# Patient Record
Sex: MALE | Race: WHITE | NOT HISPANIC OR LATINO | Employment: OTHER | URBAN - METROPOLITAN AREA
[De-identification: names, ages, dates, MRNs, and addresses within clinical notes are randomized per-mention and may not be internally consistent; named-entity substitution may affect disease eponyms.]

---

## 2017-01-11 ENCOUNTER — OFFICE VISIT (OUTPATIENT)
Dept: PULMONOLOGY | Facility: CLINIC | Age: 52
End: 2017-01-11
Payer: MEDICARE

## 2017-01-11 VITALS
DIASTOLIC BLOOD PRESSURE: 80 MMHG | HEIGHT: 70 IN | WEIGHT: 253.5 LBS | BODY MASS INDEX: 36.29 KG/M2 | SYSTOLIC BLOOD PRESSURE: 124 MMHG | HEART RATE: 96 BPM | OXYGEN SATURATION: 94 %

## 2017-01-11 DIAGNOSIS — J44.9 COPD MIXED TYPE: ICD-10-CM

## 2017-01-11 DIAGNOSIS — J45.40 MODERATE PERSISTENT ASTHMA, UNCOMPLICATED: ICD-10-CM

## 2017-01-11 DIAGNOSIS — J98.6 DIAPHRAGM PARALYSIS: Primary | ICD-10-CM

## 2017-01-11 PROCEDURE — 1159F MED LIST DOCD IN RCRD: CPT | Mod: S$GLB,,, | Performed by: INTERNAL MEDICINE

## 2017-01-11 PROCEDURE — 99999 PR PBB SHADOW E&M-EST. PATIENT-LVL IV: CPT | Mod: PBBFAC,,, | Performed by: INTERNAL MEDICINE

## 2017-01-11 PROCEDURE — 99214 OFFICE O/P EST MOD 30 MIN: CPT | Mod: S$GLB,,, | Performed by: INTERNAL MEDICINE

## 2017-01-11 RX ORDER — ALBUTEROL SULFATE 90 UG/1
1 AEROSOL, METERED RESPIRATORY (INHALATION) 4 TIMES DAILY PRN
Qty: 18 G | Refills: 11 | Status: SHIPPED | OUTPATIENT
Start: 2017-01-11 | End: 2018-04-16 | Stop reason: SDUPTHER

## 2017-01-11 RX ORDER — BUDESONIDE AND FORMOTEROL FUMARATE DIHYDRATE 160; 4.5 UG/1; UG/1
2 AEROSOL RESPIRATORY (INHALATION) EVERY 12 HOURS
Qty: 1 INHALER | Refills: 11 | Status: SHIPPED | OUTPATIENT
Start: 2017-01-11 | End: 2018-01-25

## 2017-01-11 RX ORDER — DIAZEPAM 10 MG/1
TABLET ORAL
Refills: 0 | COMMUNITY
Start: 2016-11-07 | End: 2018-10-01 | Stop reason: SDUPTHER

## 2017-01-11 RX ORDER — MONTELUKAST SODIUM 10 MG/1
10 TABLET ORAL NIGHTLY
Qty: 30 TABLET | Refills: 5 | Status: SHIPPED | OUTPATIENT
Start: 2017-01-11 | End: 2018-01-25

## 2017-01-11 RX ORDER — BUMETANIDE 2 MG/1
2 TABLET ORAL
COMMUNITY
End: 2017-11-20 | Stop reason: SDUPTHER

## 2017-01-11 RX ORDER — AZITHROMYCIN 500 MG/1
TABLET, FILM COATED ORAL
Qty: 3 TABLET | Refills: 3 | Status: SHIPPED | OUTPATIENT
Start: 2017-01-11 | End: 2018-01-25

## 2017-01-11 RX ORDER — IPRATROPIUM BROMIDE AND ALBUTEROL SULFATE 2.5; .5 MG/3ML; MG/3ML
3 SOLUTION RESPIRATORY (INHALATION) EVERY 6 HOURS PRN
Qty: 120 VIAL | Refills: 11 | Status: SHIPPED | OUTPATIENT
Start: 2017-01-11 | End: 2018-01-25

## 2017-01-11 RX ORDER — ALBUTEROL SULFATE 4 MG/1
4 TABLET ORAL 3 TIMES DAILY PRN
Qty: 90 TABLET | Refills: 11 | Status: SHIPPED | OUTPATIENT
Start: 2017-01-11 | End: 2018-01-11

## 2017-01-11 NOTE — PROGRESS NOTES
1/11/2017    Mina Zelaya  Office Note    Chief Complaint   Patient presents with    Follow-up     3 month    COPD     Fan 11, 2017- inactivity ppt weight gain, mood bad at times, daughter and grandson moved out and  Pt feeling down a bit.     Uses o2 and non invasive ventilator -- extreme air hunger with activity and supine. aspriates 2/day to 2 /week.  Uses bronchial rx.      Uses niv and o2 every night for sleep and for naps 1-2 daily.  Feels like worsening.              Sept 9, 2016HPI: worse off advair and spiriva, irratents worsen, niv used 12/24 daily, cannot bend over and uc doing poorly due to abd pain.        The chief compliant  problem is worsening  PFSH:  Past Medical History   Diagnosis Date    Arthritis     Asthma     Chronic bronchitis     Emphysema of lung          History reviewed. No pertinent past surgical history.  Social History   Substance Use Topics    Smoking status: Former Smoker     Packs/day: 1.00     Years: 15.00     Types: Cigarettes     Quit date: 6/6/2003    Smokeless tobacco: Never Used    Alcohol use 15.0 oz/week     25 Cans of beer per week     Family History   Problem Relation Age of Onset    Stroke Mother     No Known Problems Father     Pneumonia Sister     Stroke Brother     No Known Problems Maternal Aunt     Stroke Paternal Aunt     Cancer Paternal Uncle     Cancer Maternal Grandmother     Heart failure Maternal Grandfather     Stroke Paternal Grandmother     No Known Problems Paternal Grandfather     Stroke Brother     No Known Problems Brother     No Known Problems Brother      Review of patient's allergies indicates:   Allergen Reactions    Sulfa (sulfonamide antibiotics) Rash       Performance Status:The patient's activity level is housebound activities.      Review of Systems:  a review of eleven systems covering constitutional, Eye, HEENT, Psych, Respiratory, Cardiac, GI, , Musculoskeletal, Endocrine, Dermatologic was negative except for  "pertinent findings as listed ABOVE and below: all good except r elbow arthritis and neck pain     Exam:Comprehensive exam done.   Visit Vitals    /80 (BP Location: Right arm, Patient Position: Sitting, BP Method: Automatic)    Pulse 96    Ht 5' 10" (1.778 m)    Wt 115 kg (253 lb 8.5 oz)    SpO2 (!) 94%    BMI 36.38 kg/m2     Exam included Vitals as listed, and patient's appearance and affect and alertness and mood, oral exam for yeast and hygiene and pharynx lesions and Mallapatti (M) score, neck with inspection for jvd and masses and thyroid abnormalities and lymph nodes (supraclavicular and infraclavicular nodes and axillary also examined and noted if abn), chest exam included symmetry and effort and fremitus and percussion and auscultation, cardiac exam included rhythm and gallops and murmur and rubs and jvd and edema, abdominal exam for mass and hepatosplenomegaly and tenderness and hernias and bowel sounds, Musculoskeletal exam with muscle tone and posture and mobility/gait and  strength, and skin for rashes and cyanosis and pallor and turgor, extremity for clubbing.  Findings were normal except for pertinent findings listed below:  M3 and no diaphragm motion, good bs but shallow.    Radiographs reviewed: was not done     Labs was not done       PFT was not done    Plan:  Clinical impression is resonably certain and repeated evaluation prn +/- follow up will be needed as below.    Mina was seen today for follow-up and copd.    Diagnoses and all orders for this visit:    Diaphragm paralysis  -     PULSE OXIMETRY OVERNIGHT    Moderate persistent asthma, uncomplicated  -     albuterol-ipratropium 2.5mg-0.5mg/3mL (DUO-NEB) 0.5 mg-3 mg(2.5 mg base)/3 mL nebulizer solution; Take 3 mLs by nebulization every 6 (six) hours as needed for Wheezing.  -     albuterol (PROVENTIL) 4 MG Tab; Take 1 tablet (4 mg total) by mouth 3 (three) times daily as needed.  -     PULSE OXIMETRY OVERNIGHT  -     VENTOLIN HFA " "90 mcg/actuation inhaler; Inhale 1 puff into the lungs 4 (four) times daily as needed.  -     azithromycin (ZITHROMAX) 500 MG tablet; One daily for yellow mucous, repeat if needed  -     montelukast (SINGULAIR) 10 mg tablet; Take 1 tablet (10 mg total) by mouth every evening.  -     budesonide-formoterol 160-4.5 mcg (SYMBICORT) 160-4.5 mcg/actuation HFAA; Inhale 2 puffs into the lungs every 12 (twelve) hours.  -     aclidinium bromide (TUDORZA) 400 mcg/actuation AePB; Inhale 1 puff into the lungs 2 (two) times daily.    COPD mixed type  -     albuterol-ipratropium 2.5mg-0.5mg/3mL (DUO-NEB) 0.5 mg-3 mg(2.5 mg base)/3 mL nebulizer solution; Take 3 mLs by nebulization every 6 (six) hours as needed for Wheezing.  -     albuterol (PROVENTIL) 4 MG Tab; Take 1 tablet (4 mg total) by mouth 3 (three) times daily as needed.  -     PULSE OXIMETRY OVERNIGHT  -     VENTOLIN HFA 90 mcg/actuation inhaler; Inhale 1 puff into the lungs 4 (four) times daily as needed.  -     azithromycin (ZITHROMAX) 500 MG tablet; One daily for yellow mucous, repeat if needed  -     budesonide-formoterol 160-4.5 mcg (SYMBICORT) 160-4.5 mcg/actuation HFAA; Inhale 2 puffs into the lungs every 12 (twelve) hours.  -     aclidinium bromide (TUDORZA) 400 mcg/actuation AePB; Inhale 1 puff into the lungs 2 (two) times daily.      Return in about 1 year (around 1/11/2018), or if symptoms worsen or fail to improve.    Discussed with patient above for education the following:       Have daughter check out "astra and me" program to get your symbicort and tudorza covered.  This replaces stiloto.    Most cost effective airway therapy would be duo neb (albuterol imprtroprium) 3- 4 a day.      Stop singulair and stay off, may resume if can feel dramatic benefit.    Albuterol pills should be very inexpensive off insurance-- ask pharmacy.    Check oxygen with sleep on o2, off o2 more likely to get qualified.    Choking may indicate need for more evaluation again.  "

## 2017-01-11 NOTE — PATIENT INSTRUCTIONS
"Have daughter check out "astra and me" program to get your symbicort and tudorza covered.  This replaces stiloto.    Most cost effective airway therapy would be duo neb (albuterol imprtroprium) 3- 4 a day.      Stop singulair and stay off, may resume if can feel dramatic benefit.    Albuterol pills should be very inexpensive off insurance-- ask pharmacy.    Check oxygen with sleep on o2, off o2 more likely to get qualified.    Choking may indicate need for more evaluation again.  "

## 2017-01-11 NOTE — MR AVS SNAPSHOT
[facility]  [address]  [place] [address]  Phone: [phone]                  Mina Zelaya   2017 8:20 AM   Office Visit    Description:  Male : 1965   Provider:  Ruben Leiva MD   Department:  [facility]           Reason for Visit     Follow-up     COPD           Diagnoses this Visit        Comments    Diaphragm paralysis    -  Primary     Moderate persistent asthma, uncomplicated         COPD mixed type                To Do List           Future Appointments        Provider Department Dept Phone    2017 8:20 AM MD [facility] [phone]      Goals (5 Years of Data)     None      Follow-Up and Disposition     Return in about 1 year (around 2018), or if symptoms worsen or fail to improve.       These Medications        Disp Refills Start End    albuterol-ipratropium 2.5mg-0.5mg/3mL (DUO-NEB) 0.5 mg-3 mg(2.5 mg base)/3 mL nebulizer solution 120 vial 11 2017    Take 3 mLs by nebulization every 6 (six) hours as needed for Wheezing. - Nebulization    Pharmacy: [facility] [id number]  [address] AT [facility] Ph #: [phone]       albuterol (PROVENTIL) 4 MG Tab 90 tablet 11 2017    Take 1 tablet (4 mg total) by mouth 3 (three) times daily as needed. - Oral    Pharmacy: [facility] [id number] AT [facility] Ph #: [phone]       VENTOLIN HFA 90 mcg/actuation inhaler 18 g 11 2017     Inhale 1 puff into the lungs 4 (four) times daily as needed. - Inhalation    Pharmacy: [facility] [id number] AT [facility] Ph #: [phone]       azithromycin (ZITHROMAX) 500 MG tablet 3 tablet 3 2017     One daily for yellow mucous, repeat if needed    Pharmacy: [facility] [id number] [address] AT [facility] [place] Ph #: [phone]       montelukast (SINGULAIR) 10 mg tablet 30 tablet 5 1/11/2017     Take 1 tablet (10 mg total) by mouth every evening. - Oral    [place] [address] [place] [address] AT [facility] [place] Ph #: [phone]       budesonide-formoterol 160-4.5 mcg (SYMBICORT) 160-4.5 mcg/actuation HFAA 1 Inhaler 11 1/11/2017 1/11/2018    Inhale 2 puffs into the lungs every 12 (twelve) hours. - Inhalation    Pharmacy: [facility] [address] [place], [place] [address] [address] [address] AT [facility] [place] Ph #: [phone]       aclidinium bromide (TUDORZA) 400 mcg/actuation AePB 1 each 11 1/11/2017     Inhale 1 puff into the lungs 2 (two) times daily. - Inhalation    Pharmacy: [facility] [address] [place] [address] [place] [address] AT [facility] [place] Ph #: [phone]         [facility]     [facility] On Call Nurse Care Line - 24/7 Assistance  Registered nurses in the [facility] On Call Center provide clinical advisement, health education, appointment booking, and other advisory services.  Call for this free service at [phone].             Medications           Message regarding Medications     Verify the changes and/or additions to your medication regime listed below are the same as discussed with your clinician today.  If any of these changes or additions are incorrect, please notify your healthcare provider.        START taking these NEW medications        Refills    albuterol-ipratropium 2.5mg-0.5mg/3mL (DUO-NEB) 0.5 mg-3 mg(2.5 mg base)/3 mL nebulizer solution 11    Sig: Take 3 mLs by nebulization every 6 (six) hours as needed for Wheezing.    Class: Normal    Route: Nebulization    albuterol (PROVENTIL) 4 MG Tab 11    Sig: Take 1 tablet (4 mg total) by mouth 3 (three) times daily as needed.    Class: Print    Route: Oral    budesonide-formoterol 160-4.5 mcg (SYMBICORT) 160-4.5  mcg/actuation HFAA 11    Sig: Inhale 2 puffs into the lungs every 12 (twelve) hours.    Class: Print    Route: Inhalation    aclidinium bromide (TUDORZA) 400 mcg/actuation AePB 11    Sig: Inhale 1 puff into the lungs 2 (two) times daily.    Class: Print    Route: Inhalation      CHANGE how you are taking these medications     Start Taking Instead of    VENTOLIN HFA 90 mcg/actuation inhaler VENTOLIN HFA 90 mcg/actuation inhaler    Dosage:  Inhale 1 puff into the lungs 4 (four) times daily as needed. Dosage:  Inhale 90 mcg into the lungs 4 (four) times daily as needed.    Reason for Change:  Reorder            Verify that the below list of medications is an accurate representation of the medications you are currently taking.  If none reported, the list may be blank. If incorrect, please contact your healthcare provider. Carry this list with you in case of emergency.           Current Medications     allopurinol (ZYLOPRIM) 100 MG tablet Take 100 mg by mouth once daily.    allopurinol (ZYLOPRIM) 300 MG tablet Take 300 mg by mouth.    aspirin (ASPIR-81) 81 MG EC tablet Take 81 mg by mouth once daily.    bumetanide (BUMEX) 2 MG tablet Take 2 mg by mouth.    calcium carbonate (OS-VÍCTOR) 600 mg (1,500 mg) Tab Take 600 mg by mouth once daily.    diazePAM (VALIUM) 10 MG Tab TK 1/2 T PO BID PRN    fish oil-omega-3 fatty acids 300-1,000 mg capsule Take 1,200 mg by mouth once daily.    ibuprofen (ADVIL,MOTRIN) 800 MG tablet Take 800 mg by mouth as needed.    Lactobacillus acidophilus (PROBIOTIC) 10 billion cell Cap Take 1 tablet by mouth once daily.    potassium chloride SA (K-DUR,KLOR-CON) 20 MEQ tablet Take 20 mEq by mouth once daily.    tiotropium-olodaterol (STIOLTO RESPIMAT) 2.5-2.5 mcg/actuation Mist Inhale 1 Inhaler into the lungs once daily.    trazodone (DESYREL) 100 MG tablet Take 100 mg by mouth once daily.    aclidinium bromide (TUDORZA) 400 mcg/actuation AePB Inhale 1 puff into the lungs 2 (two) times daily.     "albuterol (PROVENTIL) 4 MG Tab Take 1 tablet (4 mg total) by mouth 3 (three) times daily as needed.    albuterol-ipratropium 2.5mg-0.5mg/3mL (DUO-NEB) 0.5 mg-3 mg(2.5 mg base)/3 mL nebulizer solution Take 0.5 mLs by nebulization daily as needed.    albuterol-ipratropium 2.5mg-0.5mg/3mL (DUO-NEB) 0.5 mg-3 mg(2.5 mg base)/3 mL nebulizer solution Take 3 mLs by nebulization every 6 (six) hours as needed for Wheezing.    azithromycin (ZITHROMAX) 500 MG tablet One daily for yellow mucous, repeat if needed    budesonide-formoterol 160-4.5 mcg (SYMBICORT) 160-4.5 mcg/actuation HFAA Inhale 2 puffs into the lungs every 12 (twelve) hours.    DIABETIC TUSSIN MAX ST  mg/5 mL Liqd Take 10 mLs by mouth daily as needed.    fluticasone (FLONASE) 50 mcg/actuation nasal spray 1 spray by Each Nare route once daily.    hydrocodone-acetaminophen 10-325mg (NORCO)  mg Tab Take 10 tablets by mouth as needed.    montelukast (SINGULAIR) 10 mg tablet Take 1 tablet (10 mg total) by mouth every evening.    pantoprazole (PROTONIX) 40 MG tablet Take 1 tablet (40 mg total) by mouth once daily.    predniSONE (DELTASONE) 20 MG tablet One daily for 3 days and repeat for asthma    VENTOLIN HFA 90 mcg/actuation inhaler Inhale 1 puff into the lungs 4 (four) times daily as needed.           Clinical Reference Information           Vital Signs - Last Recorded  Most recent update: 1/11/2017  8:23 AM by Paola Alvarez MA    BP Pulse Ht Wt SpO2 BMI    124/80 (BP Location: Right arm, Patient Position: Sitting, BP Method: Automatic) 96 5' 10" (1.778 m) 115 kg (253 lb 8.5 oz) (!) 94% 36.38 kg/m2      Blood Pressure          Most Recent Value    BP  124/80      Allergies as of 1/11/2017     Sulfa (Sulfonamide Antibiotics)      Immunizations Administered on Date of Encounter - 1/11/2017     None      Orders Placed During Today's Visit      Normal Orders This Visit    PULSE OXIMETRY OVERNIGHT       Instructions    Have daughter check out "astra " "and me" program to get your symbicort and tudorza covered.  This replaces stiloto.    Most cost effective airway therapy would be duo neb (albuterol imprtroprium) 3- 4 a day.      Stop singulair and stay off, may resume if can feel dramatic benefit.    Albuterol pills should be very inexpensive off insurance-- ask pharmacy.    Check oxygen with sleep on o2, off o2 more likely to get qualified.    Choking may indicate need for more evaluation again.       "

## 2017-04-12 ENCOUNTER — OFFICE VISIT (OUTPATIENT)
Dept: PULMONOLOGY | Facility: CLINIC | Age: 52
End: 2017-04-12
Payer: MEDICARE

## 2017-04-12 VITALS
WEIGHT: 256.38 LBS | HEIGHT: 70 IN | HEART RATE: 84 BPM | OXYGEN SATURATION: 96 % | SYSTOLIC BLOOD PRESSURE: 114 MMHG | BODY MASS INDEX: 36.7 KG/M2 | DIASTOLIC BLOOD PRESSURE: 70 MMHG

## 2017-04-12 DIAGNOSIS — J98.6 DIAPHRAGM PARALYSIS: ICD-10-CM

## 2017-04-12 DIAGNOSIS — R09.89 CHRONIC SINUS COMPLAINTS: ICD-10-CM

## 2017-04-12 DIAGNOSIS — J45.40 MODERATE PERSISTENT ASTHMA, UNCOMPLICATED: Primary | ICD-10-CM

## 2017-04-12 DIAGNOSIS — J96.10 CHRONIC RESPIRATORY FAILURE, UNSPECIFIED WHETHER WITH HYPOXIA OR HYPERCAPNIA: ICD-10-CM

## 2017-04-12 PROCEDURE — 99213 OFFICE O/P EST LOW 20 MIN: CPT | Mod: S$GLB,,, | Performed by: INTERNAL MEDICINE

## 2017-04-12 PROCEDURE — 1160F RVW MEDS BY RX/DR IN RCRD: CPT | Mod: S$GLB,,, | Performed by: INTERNAL MEDICINE

## 2017-04-12 PROCEDURE — 99999 PR PBB SHADOW E&M-EST. PATIENT-LVL IV: CPT | Mod: PBBFAC,,, | Performed by: INTERNAL MEDICINE

## 2017-04-12 RX ORDER — FLUTICASONE PROPIONATE 50 MCG
1 SPRAY, SUSPENSION (ML) NASAL DAILY
Qty: 1 BOTTLE | Refills: 11 | Status: SHIPPED | OUTPATIENT
Start: 2017-04-12 | End: 2018-01-25

## 2017-04-12 RX ORDER — ROSUVASTATIN CALCIUM 5 MG
TABLET ORAL
COMMUNITY
Start: 2017-03-01 | End: 2017-10-21 | Stop reason: SDUPTHER

## 2017-04-12 NOTE — MR AVS SNAPSHOT
[facility]  [address]  [place] [address]  Phone: [phone]                  Mina Zelaya   2017 8:20 AM   Office Visit    Description:  Male : 1965   Provider:  Ruben Leiva MD   Department:  [facility]           Reason for Visit     Follow-up     COPD           Diagnoses this Visit        Comments    Moderate persistent asthma, uncomplicated    -  Primary     Chronic sinus complaints         Diaphragm paralysis         Chronic respiratory failure, unspecified whether with hypoxia or hypercapnia                To Do List           Goals (5 Years of Data)     None      Follow-Up and Disposition     Return in about 1 year (around 2018), or if symptoms worsen or fail to improve.    Follow-up and Disposition History       These Medications        Disp Refills Start End    fluticasone (FLONASE) 50 mcg/actuation nasal spray 1 Bottle 11 2017     1 spray by Each Nare route once daily. - Each Nare    Pharmacy: [facility] [address] [facility] [address] AT [facility] Ph #: [phone]         [facility] On Call     [facility] Nurse Care Line - 24 Assistance  Unless otherwise directed by your provider, please contact [facility], our nurse care line that is available for / assistance.     Registered nurses in the [facility] On Call Center provide: appointment scheduling, clinical advisement, health education, and other advisory services.  Call: [phone] (toll free)               Medications           Message regarding Medications     Verify the changes and/or additions to your medication regime listed below are the same as discussed with your clinician today.  If any of these changes or additions are incorrect, please notify your healthcare provider.             Verify that the below list of medications is an accurate representation of the medications you are currently taking.  If none  reported, the list may be blank. If incorrect, please contact your healthcare provider. Carry this list with you in case of emergency.           Current Medications     albuterol (PROVENTIL) 4 MG Tab Take 1 tablet (4 mg total) by mouth 3 (three) times daily as needed.    allopurinol (ZYLOPRIM) 100 MG tablet Take 100 mg by mouth once daily.    allopurinol (ZYLOPRIM) 300 MG tablet Take 300 mg by mouth.    aspirin (ASPIR-81) 81 MG EC tablet Take 81 mg by mouth once daily.    bumetanide (BUMEX) 2 MG tablet Take 2 mg by mouth.    calcium carbonate (OS-VÍCTOR) 600 mg (1,500 mg) Tab Take 600 mg by mouth once daily.    CRESTOR 5 mg tablet     diazePAM (VALIUM) 10 MG Tab TK 1/2 T PO BID PRN    fish oil-omega-3 fatty acids 300-1,000 mg capsule Take 1,200 mg by mouth once daily.    ibuprofen (ADVIL,MOTRIN) 800 MG tablet Take 800 mg by mouth as needed.    Lactobacillus acidophilus (PROBIOTIC) 10 billion cell Cap Take 1 tablet by mouth once daily.    montelukast (SINGULAIR) 10 mg tablet Take 1 tablet (10 mg total) by mouth every evening.    potassium chloride SA (K-DUR,KLOR-CON) 20 MEQ tablet Take 20 mEq by mouth once daily.    tiotropium-olodaterol (STIOLTO RESPIMAT) 2.5-2.5 mcg/actuation Mist Inhale 1 Inhaler into the lungs once daily.    trazodone (DESYREL) 100 MG tablet Take 100 mg by mouth once daily.    aclidinium bromide (TUDORZA) 400 mcg/actuation AePB Inhale 1 puff into the lungs 2 (two) times daily.    albuterol-ipratropium 2.5mg-0.5mg/3mL (DUO-NEB) 0.5 mg-3 mg(2.5 mg base)/3 mL nebulizer solution Take 3 mLs by nebulization every 6 (six) hours as needed for Wheezing.    azithromycin (ZITHROMAX) 500 MG tablet One daily for yellow mucous, repeat if needed    budesonide-formoterol 160-4.5 mcg (SYMBICORT) 160-4.5 mcg/actuation HFAA Inhale 2 puffs into the lungs every 12 (twelve) hours.    DIABETIC TUSSIN MAX ST  mg/5 mL Liqd Take 10 mLs by mouth daily as needed.    fluticasone (FLONASE) 50 mcg/actuation nasal spray 1  "spray by Each Nare route once daily.    hydrocodone-acetaminophen 10-325mg (NORCO)  mg Tab Take 10 tablets by mouth as needed.    pantoprazole (PROTONIX) 40 MG tablet Take 1 tablet (40 mg total) by mouth once daily.    predniSONE (DELTASONE) 20 MG tablet One daily for 3 days and repeat for asthma    VENTOLIN HFA 90 mcg/actuation inhaler Inhale 1 puff into the lungs 4 (four) times daily as needed.           Clinical Reference Information           Your Vitals Were     BP Pulse Height Weight SpO2 BMI    114/70 (BP Location: Left arm, Patient Position: Sitting, BP Method: Automatic) 84 5' 10" (1.778 m) 116.3 kg (256 lb 6.3 oz) 96% 36.79 kg/m2      Blood Pressure          Most Recent Value    BP  114/70      Allergies as of 4/12/2017     Sulfa (Sulfonamide Antibiotics)      Immunizations Administered on Date of Encounter - 4/12/2017     None      Instructions    Cause of paralysis not clear, not likely reversable.    Pacer not likely helpful- reasonable to go to center for evaluation?    Plication of diaphragm is invasive surgical, may not help much, useful for one side paralysis.    Could have eval at main campus?  Follow here later?          Nasal problems   Afrin one or 2 bedtime if stuffy, use flonase one each side- out to side.    Asthma Program may help get meds?? [place]       Sleep testing may help?       Language Assistance Services     ATTENTION: Language assistance services are available, free of charge. Please call [phone].      ATENCIÓN: Si habla español, tiene a esquivel disposición servicios gratuitos de asistencia lingüística. Llame al [phone].     [facility] Ý: N?u b?n nói Ti?ng Vi?t, có các d?ch v? h? tr? ngôn ng? mi?n phí dành cho b?n. G?i s? [phone].         [facility] complies with applicable Federal civil rights laws and does not discriminate on the basis of race, color, national origin, age, disability, or sex.        "

## 2017-04-12 NOTE — PATIENT INSTRUCTIONS
Cause of paralysis not clear, not likely reversable.    Pacer not likely helpful- reasonable to go to center for evaluation?    Plication of diaphragm is invasive surgical, may not help much, useful for one side paralysis.    Could have eval at main campus?  Follow here later?          Nasal problems   Afrin one or 2 bedtime if stuffy, use flonase one each side- out to side.    Asthma Program may help get meds?? [place]       Sleep testing may help?

## 2017-04-12 NOTE — PROGRESS NOTES
4/12/2017    Mina Zelaya  Office Note    Chief Complaint   Patient presents with    Follow-up     3 month    COPD       April 12, lives alone, sees grandson daily post school, having freq nose bleed, uses full face mask for niv.  Frustrated, marked starr.          jan 11, 2017- inactivity ppt weight gain, mood bad at times, daughter and grandson moved out and  Pt feeling down a bit.     Uses o2 and non invasive ventilator -- extreme air hunger with activity and supine. aspriates 2/day to 2 /week.  Uses bronchial rx.      Uses niv and o2 every night for sleep and for naps 1-2 daily.  Feels like worsening.              Sept 9, 2016HPI: worse off advair and spiriva, irratents worsen, niv used 12/24 daily, cannot bend over and uc doing poorly due to abd pain.        The chief compliant  problem is worsening  PFSH:  Past Medical History:   Diagnosis Date    Arthritis     Asthma     Chronic bronchitis     Emphysema of lung          History reviewed. No pertinent surgical history.  Social History   Substance Use Topics    Smoking status: Former Smoker     Packs/day: 1.00     Years: 15.00     Types: Cigarettes     Quit date: 6/6/2003    Smokeless tobacco: Never Used    Alcohol use 15.0 oz/week     25 Cans of beer per week     Family History   Problem Relation Age of Onset    Stroke Mother     No Known Problems Father     Pneumonia Sister     Stroke Brother     No Known Problems Maternal Aunt     Stroke Paternal Aunt     Cancer Paternal Uncle     Cancer Maternal Grandmother     Heart failure Maternal Grandfather     Stroke Paternal Grandmother     No Known Problems Paternal Grandfather     Stroke Brother     No Known Problems Brother     No Known Problems Brother      Review of patient's allergies indicates:   Allergen Reactions    Sulfa (sulfonamide antibiotics) Rash       Performance Status:The patient's activity level is housebound activities.      Review of Systems:  a review of eleven systems  "covering constitutional, Eye, HEENT, Psych, Respiratory, Cardiac, GI, , Musculoskeletal, Endocrine, Dermatologic was negative except for pertinent findings as listed ABOVE and below: all good except r elbow arthritis and neck pain     Exam:Comprehensive exam done.   /70 (BP Location: Left arm, Patient Position: Sitting, BP Method: Automatic)  Pulse 84  Ht 5' 10" (1.778 m)  Wt 116.3 kg (256 lb 6.3 oz)  SpO2 96%  BMI 36.79 kg/m2  Exam included Vitals as listed, and patient's appearance and affect and alertness and mood, oral exam for yeast and hygiene and pharynx lesions and Mallapatti (M) score, neck with inspection for jvd and masses and thyroid abnormalities and lymph nodes (supraclavicular and infraclavicular nodes and axillary also examined and noted if abn), chest exam included symmetry and effort and fremitus and percussion and auscultation, cardiac exam included rhythm and gallops and murmur and rubs and jvd and edema, abdominal exam for mass and hepatosplenomegaly and tenderness and hernias and bowel sounds, Musculoskeletal exam with muscle tone and posture and mobility/gait and  strength, and skin for rashes and cyanosis and pallor and turgor, extremity for clubbing.  Findings were normal except for pertinent findings listed below:  M3 and no diaphragm motion, good bs but shallow.    Radiographs reviewed: was not done     Labs was not done       PFT was not done    Plan:  Clinical impression is resonably certain and repeated evaluation prn +/- follow up will be needed as below.    Mina was seen today for follow-up and copd.    Diagnoses and all orders for this visit:    Moderate persistent asthma, uncomplicated    Chronic sinus complaints  -     fluticasone (FLONASE) 50 mcg/actuation nasal spray; 1 spray by Each Nare route once daily.    Diaphragm paralysis      Return in about 1 year (around 4/12/2018), or if symptoms worsen or fail to improve.    Discussed with patient above for education " the following:      Cause of paralysis not clear, not likely reversable.    Pacer not likely helpful- reasonable to go to center for evaluation?    Plication of diaphragm is invasive surgical, may not help much, useful for one side paralysis.    Could have eval at main campus?  Follow here later?          Nasal problems   Afrin one or 2 bedtime if stuffy, use flonase one each side- out to side.    Asthma Program may help get meds?? [place]       Sleep testing may help?

## 2017-09-08 RX ORDER — ALLOPURINOL 100 MG/1
TABLET ORAL
Qty: 90 TABLET | Refills: 1 | Status: SHIPPED | OUTPATIENT
Start: 2017-09-08 | End: 2018-01-25

## 2017-09-25 RX ORDER — ALLOPURINOL 300 MG/1
TABLET ORAL
Qty: 90 TABLET | Refills: 1 | Status: SHIPPED | OUTPATIENT
Start: 2017-09-25 | End: 2018-03-07 | Stop reason: SDUPTHER

## 2017-10-20 NOTE — TELEPHONE ENCOUNTER
----- Message from Moe Hernandez sent at 10/20/2017  4:14 PM CDT -----  Contact: Peg Ruvalcaba with [facility] called to verify if script for oxygen was signed fax to [phone],if any questions call back at [phone] ext [phone]. Thanks,

## 2017-10-20 NOTE — TELEPHONE ENCOUNTER
----- Message from Moe Hernandez sent at 10/20/2017  4:14 PM CDT -----  Contact: Peg Ruvalcaba with [facility] called to verify if script for oxygen was signed fax to [phone],if any questions call back at [phone] ext [phone]. Thanks,

## 2017-10-21 RX ORDER — ROSUVASTATIN CALCIUM 5 MG/1
TABLET, COATED ORAL
Qty: 90 TABLET | Refills: 0 | Status: SHIPPED | OUTPATIENT
Start: 2017-10-21 | End: 2018-01-23 | Stop reason: SDUPTHER

## 2017-11-20 RX ORDER — BUMETANIDE 2 MG/1
TABLET ORAL
Qty: 90 TABLET | Refills: 0 | Status: SHIPPED | OUTPATIENT
Start: 2017-11-20 | End: 2018-03-16 | Stop reason: SDUPTHER

## 2018-01-02 RX ORDER — IBUPROFEN 800 MG/1
TABLET ORAL
Qty: 270 TABLET | Refills: 2 | Status: SHIPPED | OUTPATIENT
Start: 2018-01-02 | End: 2019-08-12 | Stop reason: SDUPTHER

## 2018-01-23 RX ORDER — ROSUVASTATIN CALCIUM 5 MG/1
TABLET, COATED ORAL
Qty: 90 TABLET | Refills: 0 | Status: SHIPPED | OUTPATIENT
Start: 2018-01-23 | End: 2018-04-24 | Stop reason: SDUPTHER

## 2018-01-25 ENCOUNTER — OFFICE VISIT (OUTPATIENT)
Dept: PRIMARY CARE CLINIC | Facility: CLINIC | Age: 53
End: 2018-01-25
Payer: MEDICARE

## 2018-01-25 VITALS
DIASTOLIC BLOOD PRESSURE: 89 MMHG | HEART RATE: 85 BPM | HEIGHT: 70 IN | RESPIRATION RATE: 18 BRPM | SYSTOLIC BLOOD PRESSURE: 146 MMHG | BODY MASS INDEX: 37.8 KG/M2 | OXYGEN SATURATION: 95 % | TEMPERATURE: 98 F | WEIGHT: 264 LBS

## 2018-01-25 DIAGNOSIS — Z23 NEED FOR VACCINATION: ICD-10-CM

## 2018-01-25 DIAGNOSIS — Z12.5 PROSTATE CANCER SCREENING: ICD-10-CM

## 2018-01-25 DIAGNOSIS — M51.36 DDD (DEGENERATIVE DISC DISEASE), LUMBAR: Primary | ICD-10-CM

## 2018-01-25 DIAGNOSIS — E66.2 CLASS 2 OBESITY WITH ALVEOLAR HYPOVENTILATION, SERIOUS COMORBIDITY, AND BODY MASS INDEX (BMI) OF 37.0 TO 37.9 IN ADULT: ICD-10-CM

## 2018-01-25 DIAGNOSIS — J96.10 CHRONIC RESPIRATORY FAILURE, UNSPECIFIED WHETHER WITH HYPOXIA OR HYPERCAPNIA: ICD-10-CM

## 2018-01-25 DIAGNOSIS — E78.5 HYPERLIPIDEMIA, UNSPECIFIED HYPERLIPIDEMIA TYPE: ICD-10-CM

## 2018-01-25 DIAGNOSIS — E11.69 TYPE 2 DIABETES MELLITUS WITH HYPERLIPIDEMIA: ICD-10-CM

## 2018-01-25 DIAGNOSIS — J44.9 COPD MIXED TYPE: ICD-10-CM

## 2018-01-25 DIAGNOSIS — E78.5 TYPE 2 DIABETES MELLITUS WITH HYPERLIPIDEMIA: ICD-10-CM

## 2018-01-25 PROBLEM — K51.90 ULCERATIVE COLITIS: Status: ACTIVE | Noted: 2018-01-25

## 2018-01-25 PROBLEM — M51.369 DDD (DEGENERATIVE DISC DISEASE), LUMBAR: Status: ACTIVE | Noted: 2018-01-25

## 2018-01-25 PROBLEM — E66.812 CLASS 2 OBESITY WITH ALVEOLAR HYPOVENTILATION, SERIOUS COMORBIDITY, AND BODY MASS INDEX (BMI) OF 38.0 TO 38.9 IN ADULT: Status: ACTIVE | Noted: 2018-01-25

## 2018-01-25 PROBLEM — G47.33 OSA (OBSTRUCTIVE SLEEP APNEA): Status: ACTIVE | Noted: 2018-01-25

## 2018-01-25 PROCEDURE — 90686 IIV4 VACC NO PRSV 0.5 ML IM: CPT | Mod: S$GLB,,, | Performed by: FAMILY MEDICINE

## 2018-01-25 PROCEDURE — 99999 PR PBB SHADOW E&M-EST. PATIENT-LVL III: CPT | Mod: PBBFAC,,, | Performed by: FAMILY MEDICINE

## 2018-01-25 PROCEDURE — 99214 OFFICE O/P EST MOD 30 MIN: CPT | Mod: S$GLB,,, | Performed by: FAMILY MEDICINE

## 2018-01-25 PROCEDURE — G0008 ADMIN INFLUENZA VIRUS VAC: HCPCS | Mod: S$GLB,,, | Performed by: FAMILY MEDICINE

## 2018-01-25 RX ORDER — HYDROCODONE BITARTRATE AND ACETAMINOPHEN 10; 325 MG/1; MG/1
1 TABLET ORAL EVERY 8 HOURS PRN
Qty: 90 TABLET | Refills: 0 | Status: SHIPPED | OUTPATIENT
Start: 2018-01-25 | End: 2018-10-01 | Stop reason: SDUPTHER

## 2018-01-25 NOTE — PROGRESS NOTES
Patient ID by name and  Flu vac unit adult dose 0.50 ml IM given in left deltoid Tolerated well Aseptic tech used, no bleeding at the site noted observed for 15 min no adverse reaction noted.

## 2018-01-25 NOTE — PROGRESS NOTES
"Subjective:       Patient ID: Mina Zelaya is a 52 y.o. male.    Chief Complaint: Back Pain (pt has fell on ice, fell of his ladder, and was on the floor and when he got up he heard a pop )    Stood up from seated position on the ground ~10 days ago, felt "pop" in left lower back, then 2 days later fell off ladder while working on his smoke detector, then next day slipped and fell on ice. Still having left lower back pain, nonradiating, worse with lying down or standing from seated position. No weakness      Review of Systems   Constitutional: Negative for fever.   HENT: Negative for trouble swallowing.    Eyes: Negative for visual disturbance.   Respiratory: Positive for shortness of breath (chronic).    Cardiovascular: Negative for chest pain.   Gastrointestinal: Negative for blood in stool, nausea and vomiting.   Genitourinary: Negative for difficulty urinating.   Musculoskeletal: Positive for back pain.   Neurological: Negative for seizures, syncope and light-headedness.   Psychiatric/Behavioral: Negative for agitation and confusion.       Objective:      Vitals:    01/25/18 0844   BP: (!) 146/89   BP Location: Left arm   Patient Position: Sitting   BP Method: Large (Automatic)   Pulse: 85   Resp: 18   Temp: 98.2 °F (36.8 °C)   TempSrc: Oral   SpO2: 95%   Weight: 119.7 kg (264 lb)   Height: 5' 10" (1.778 m)     Physical Exam   Constitutional: He is oriented to person, place, and time. He appears well-developed and well-nourished.   HENT:   Head: Normocephalic and atraumatic.   Eyes: EOM are normal.   Neck: Neck supple. No JVD present.   Cardiovascular: Normal rate, regular rhythm and normal heart sounds.    Pulmonary/Chest: Effort normal and breath sounds normal.   Abdominal: Soft. There is no tenderness.   Musculoskeletal: He exhibits no edema.        Lumbar back: He exhibits tenderness.   Positive straight leg raise on left   Neurological: He is alert and oriented to person, place, and time.   Skin: Skin is " warm and dry.   Psychiatric: He has a normal mood and affect. His behavior is normal.   Nursing note and vitals reviewed.      Assessment:       1. DDD (degenerative disc disease), lumbar    2. Class 2 obesity with alveolar hypoventilation, serious comorbidity, and body mass index (BMI) of 37.0 to 37.9 in adult    3. Type 2 diabetes mellitus with hyperlipidemia    4. Hyperlipidemia, unspecified hyperlipidemia type    5. COPD mixed type Stable   6. Chronic respiratory failure, unspecified whether with hypoxia or hypercapnia Stable   7. Prostate cancer screening    8. Need for vaccination        Plan:       DDD (degenerative disc disease), lumbar  -     hydrocodone-acetaminophen 10-325mg (NORCO)  mg Tab; Take 1 tablet by mouth every 8 (eight) hours as needed for Pain.  Dispense: 90 tablet; Refill: 0    Class 2 obesity with alveolar hypoventilation, serious comorbidity, and body mass index (BMI) of 37.0 to 37.9 in adult  Comments:  low carb diet, exercise as much as possible to facilitate weight loss  Orders:  -     TSH; Future; Expected date: 01/25/2018    Type 2 diabetes mellitus with hyperlipidemia  Comments:  due for labs  Orders:  -     Hemoglobin A1c; Future; Expected date: 01/25/2018  -     Microalbumin/creatinine urine ratio; Future; Expected date: 01/25/2018  -     TSH; Future; Expected date: 01/25/2018    Hyperlipidemia, unspecified hyperlipidemia type  -     Comprehensive metabolic panel; Future; Expected date: 01/25/2018  -     Lipid panel; Future; Expected date: 01/25/2018  -     TSH; Future; Expected date: 01/25/2018    COPD mixed type  Comments:  followed by Dr. Leiva  Orders:  -     CBC auto differential; Future; Expected date: 01/25/2018    Chronic respiratory failure, unspecified whether with hypoxia or hypercapnia  Comments:  followed by Dr. Leiva    Prostate cancer screening  -     PSA, Screening; Future; Expected date: 01/25/2018    Need for vaccination  -     Influenza - Quadrivalent (3 years  & older) (PF)      Medication List with Changes/Refills   Current Medications    ADALIMUMAB (HUMIRA) 40 MG/0.8 ML SYKT INJECTION    Inject 40 mg into the skin every 14 (fourteen) days.    ALLOPURINOL (ZYLOPRIM) 300 MG TABLET    TAKE 1 TABLET EVERY DAY    ASPIRIN (ASPIR-81) 81 MG EC TABLET    Take 81 mg by mouth once daily.    BUMETANIDE (BUMEX) 2 MG TABLET    TAKE 1 TABLET EVERY DAY    CALCIUM CARBONATE (OS-VÍCTOR) 600 MG (1,500 MG) TAB    Take 600 mg by mouth once daily.    DIAZEPAM (VALIUM) 10 MG TAB    TK 1/2 T PO BID PRN    FISH OIL-OMEGA-3 FATTY ACIDS 300-1,000 MG CAPSULE    Take 1,200 mg by mouth once daily.    IBUPROFEN (ADVIL,MOTRIN) 800 MG TABLET    TAKE 1 TABLET THREE TIMES DAILY AS NEEDED    PANTOPRAZOLE (PROTONIX) 40 MG TABLET    Take 1 tablet (40 mg total) by mouth once daily.    POTASSIUM CHLORIDE SA (K-DUR,KLOR-CON) 20 MEQ TABLET    Take 20 mEq by mouth once daily.    ROSUVASTATIN (CRESTOR) 5 MG TABLET    TAKE 1 TABLET BY MOUTH EVERY DAY    TIOTROPIUM-OLODATEROL (STIOLTO RESPIMAT) 2.5-2.5 MCG/ACTUATION MIST    Inhale 1 Inhaler into the lungs once daily.    TRAZODONE (DESYREL) 100 MG TABLET    Take 100 mg by mouth once daily.    VENTOLIN HFA 90 MCG/ACTUATION INHALER    Inhale 1 puff into the lungs 4 (four) times daily as needed.   Changed and/or Refilled Medications    Modified Medication Previous Medication    HYDROCODONE-ACETAMINOPHEN 10-325MG (NORCO)  MG TAB hydrocodone-acetaminophen 10-325mg (NORCO)  mg Tab       Take 1 tablet by mouth every 8 (eight) hours as needed for Pain.    Take 10 tablets by mouth as needed.   Discontinued Medications    ACLIDINIUM BROMIDE (TUDORZA) 400 MCG/ACTUATION AEPB    Inhale 1 puff into the lungs 2 (two) times daily.    ALBUTEROL-IPRATROPIUM 2.5MG-0.5MG/3ML (DUO-NEB) 0.5 MG-3 MG(2.5 MG BASE)/3 ML NEBULIZER SOLUTION    Take 3 mLs by nebulization every 6 (six) hours as needed for Wheezing.    ALLOPURINOL (ZYLOPRIM) 100 MG TABLET    TAKE 1 TABLET ONE TIME DAILY     AZITHROMYCIN (ZITHROMAX) 500 MG TABLET    One daily for yellow mucous, repeat if needed    BUDESONIDE-FORMOTEROL 160-4.5 MCG (SYMBICORT) 160-4.5 MCG/ACTUATION HFAA    Inhale 2 puffs into the lungs every 12 (twelve) hours.    DIABETIC TUSSIN MAX ST  MG/5 ML LIQD    Take 10 mLs by mouth daily as needed.    FLUTICASONE (FLONASE) 50 MCG/ACTUATION NASAL SPRAY    1 spray by Each Nare route once daily.    LACTOBACILLUS ACIDOPHILUS (PROBIOTIC) 10 BILLION CELL CAP    Take 1 tablet by mouth once daily.    MONTELUKAST (SINGULAIR) 10 MG TABLET    Take 1 tablet (10 mg total) by mouth every evening.    PREDNISONE (DELTASONE) 20 MG TABLET    One daily for 3 days and repeat for asthma

## 2018-02-14 DIAGNOSIS — E87.6 HYPOKALEMIA: ICD-10-CM

## 2018-02-14 RX ORDER — POTASSIUM CHLORIDE 20 MEQ/1
20 TABLET, EXTENDED RELEASE ORAL 2 TIMES DAILY
Qty: 180 TABLET | Refills: 1 | Status: SHIPPED | OUTPATIENT
Start: 2018-02-14 | End: 2018-03-22 | Stop reason: SDUPTHER

## 2018-03-07 RX ORDER — ALLOPURINOL 100 MG/1
TABLET ORAL
Qty: 90 TABLET | Refills: 1 | Status: SHIPPED | OUTPATIENT
Start: 2018-03-07 | End: 2018-11-03 | Stop reason: SDUPTHER

## 2018-03-07 RX ORDER — ALLOPURINOL 300 MG/1
TABLET ORAL
Qty: 90 TABLET | Refills: 1 | Status: SHIPPED | OUTPATIENT
Start: 2018-03-07 | End: 2018-10-12 | Stop reason: SDUPTHER

## 2018-03-16 RX ORDER — BUMETANIDE 2 MG/1
TABLET ORAL
Qty: 90 TABLET | Refills: 1 | Status: SHIPPED | OUTPATIENT
Start: 2018-03-16 | End: 2018-03-16 | Stop reason: SDUPTHER

## 2018-03-16 RX ORDER — BUMETANIDE 2 MG/1
2 TABLET ORAL DAILY
Qty: 7 TABLET | Refills: 0 | Status: SHIPPED | OUTPATIENT
Start: 2018-03-16 | End: 2018-10-05 | Stop reason: SDUPTHER

## 2018-03-16 RX ORDER — BUMETANIDE 2 MG/1
TABLET ORAL
Qty: 90 TABLET | Refills: 0 | OUTPATIENT
Start: 2018-03-16

## 2018-03-16 NOTE — TELEPHONE ENCOUNTER
Appointment made for BMP.     He wants to know if you can send over a week supply of his bumex to [facility]

## 2018-03-16 NOTE — TELEPHONE ENCOUNTER
----- Message from Diana Magaña sent at 3/16/2018 11:52 AM CDT -----  Contact: [phone]  Patient called requesting orders to have labs done.    Please call upon patient request at [phone].

## 2018-03-16 NOTE — TELEPHONE ENCOUNTER
Labs were just done on February 1.  His potassium was a little low, and we increased his potassium, and he is supposed to recheck a BMP, which is already ordered.

## 2018-03-20 ENCOUNTER — CLINICAL SUPPORT (OUTPATIENT)
Dept: PRIMARY CARE CLINIC | Facility: CLINIC | Age: 53
End: 2018-03-20
Payer: MEDICARE

## 2018-03-20 DIAGNOSIS — E11.69 TYPE 2 DIABETES MELLITUS WITH HYPERLIPIDEMIA: ICD-10-CM

## 2018-03-20 DIAGNOSIS — E78.5 TYPE 2 DIABETES MELLITUS WITH HYPERLIPIDEMIA: ICD-10-CM

## 2018-03-20 DIAGNOSIS — E87.6 HYPOKALEMIA: ICD-10-CM

## 2018-03-20 LAB
ANION GAP SERPL CALC-SCNC: 12 MMOL/L
BUN SERPL-MCNC: 15 MG/DL
CALCIUM SERPL-MCNC: 8.9 MG/DL
CHLORIDE SERPL-SCNC: 95 MMOL/L
CO2 SERPL-SCNC: 32 MMOL/L
CREAT SERPL-MCNC: 0.9 MG/DL
CREAT UR-MCNC: 131 MG/DL
EST. GFR  (AFRICAN AMERICAN): >60 ML/MIN/1.73 M^2
EST. GFR  (NON AFRICAN AMERICAN): >60 ML/MIN/1.73 M^2
GLUCOSE SERPL-MCNC: 129 MG/DL
MICROALBUMIN UR DL<=1MG/L-MCNC: 7 UG/ML
MICROALBUMIN/CREATININE RATIO: 5.3 UG/MG
POTASSIUM SERPL-SCNC: 2.8 MMOL/L
SODIUM SERPL-SCNC: 139 MMOL/L

## 2018-03-20 PROCEDURE — 99999 PR PBB SHADOW E&M-EST. PATIENT-LVL II: CPT | Mod: PBBFAC,,,

## 2018-03-20 PROCEDURE — 80048 BASIC METABOLIC PNL TOTAL CA: CPT

## 2018-03-20 PROCEDURE — 82043 UR ALBUMIN QUANTITATIVE: CPT

## 2018-03-21 RX ORDER — TRAZODONE HYDROCHLORIDE 100 MG/1
TABLET ORAL
Qty: 90 TABLET | Refills: 3 | Status: SHIPPED | OUTPATIENT
Start: 2018-03-21 | End: 2019-03-18 | Stop reason: SDUPTHER

## 2018-03-22 DIAGNOSIS — E87.6 HYPOKALEMIA: Primary | ICD-10-CM

## 2018-03-22 RX ORDER — POTASSIUM CHLORIDE 20 MEQ/1
40 TABLET, EXTENDED RELEASE ORAL 2 TIMES DAILY
Qty: 360 TABLET | Refills: 1 | Status: SHIPPED | OUTPATIENT
Start: 2018-03-22 | End: 2018-05-08 | Stop reason: SDUPTHER

## 2018-04-16 ENCOUNTER — OFFICE VISIT (OUTPATIENT)
Dept: PULMONOLOGY | Facility: CLINIC | Age: 53
End: 2018-04-16
Payer: MEDICARE

## 2018-04-16 VITALS
HEIGHT: 70 IN | OXYGEN SATURATION: 94 % | DIASTOLIC BLOOD PRESSURE: 79 MMHG | WEIGHT: 262.81 LBS | SYSTOLIC BLOOD PRESSURE: 134 MMHG | BODY MASS INDEX: 37.62 KG/M2 | HEART RATE: 99 BPM

## 2018-04-16 DIAGNOSIS — J44.9 CHRONIC OBSTRUCTIVE PULMONARY DISEASE, UNSPECIFIED COPD TYPE: ICD-10-CM

## 2018-04-16 DIAGNOSIS — J96.10 CHRONIC RESPIRATORY FAILURE, UNSPECIFIED WHETHER WITH HYPOXIA OR HYPERCAPNIA: ICD-10-CM

## 2018-04-16 DIAGNOSIS — J44.9 COPD MIXED TYPE: ICD-10-CM

## 2018-04-16 DIAGNOSIS — J98.6 DIAPHRAGM PARALYSIS: Primary | ICD-10-CM

## 2018-04-16 DIAGNOSIS — J45.40 MODERATE PERSISTENT ASTHMA, UNCOMPLICATED: ICD-10-CM

## 2018-04-16 PROCEDURE — 99999 PR PBB SHADOW E&M-EST. PATIENT-LVL IV: CPT | Mod: PBBFAC,,, | Performed by: INTERNAL MEDICINE

## 2018-04-16 PROCEDURE — 99214 OFFICE O/P EST MOD 30 MIN: CPT | Mod: S$GLB,,, | Performed by: INTERNAL MEDICINE

## 2018-04-16 RX ORDER — IPRATROPIUM BROMIDE AND ALBUTEROL SULFATE 2.5; .5 MG/3ML; MG/3ML
3 SOLUTION RESPIRATORY (INHALATION) EVERY 6 HOURS PRN
Qty: 120 VIAL | Refills: 5 | Status: SHIPPED | OUTPATIENT
Start: 2018-04-16 | End: 2019-05-22 | Stop reason: SDUPTHER

## 2018-04-16 RX ORDER — ALBUTEROL SULFATE 0.83 MG/ML
2.5 SOLUTION RESPIRATORY (INHALATION) EVERY 6 HOURS PRN
Qty: 360 EACH | Refills: 3 | Status: SHIPPED | OUTPATIENT
Start: 2018-04-16 | End: 2020-09-18 | Stop reason: SDUPTHER

## 2018-04-16 RX ORDER — MONTELUKAST SODIUM 10 MG/1
10 TABLET ORAL NIGHTLY
COMMUNITY
Start: 2018-04-04 | End: 2018-04-16 | Stop reason: SDUPTHER

## 2018-04-16 RX ORDER — ALBUTEROL SULFATE 90 UG/1
1 AEROSOL, METERED RESPIRATORY (INHALATION) 4 TIMES DAILY PRN
Qty: 18 G | Refills: 11 | Status: SHIPPED | OUTPATIENT
Start: 2018-04-16 | End: 2019-05-22 | Stop reason: SDUPTHER

## 2018-04-16 RX ORDER — MONTELUKAST SODIUM 10 MG/1
10 TABLET ORAL NIGHTLY
Qty: 90 TABLET | Refills: 3 | Status: SHIPPED | OUTPATIENT
Start: 2018-04-16 | End: 2019-05-10 | Stop reason: SDUPTHER

## 2018-04-16 NOTE — PROGRESS NOTES
4/16/2018 4/16/2018    Mina Zelaya  Office Note    Chief Complaint   Patient presents with    Annual Exam    Shortness of Breath    Asthma    COPD       April 12, lives alone, sees grandson daily post school, having freq nose bleed, uses full face mask for niv.  Frustrated, marked starr.          jan 11, 2017- inactivity ppt weight gain, mood bad at times, daughter and grandson moved out and  Pt feeling down a bit.     Uses o2 and non invasive ventilator -- extreme air hunger with activity and supine. aspriates 2/day to 2 /week.  Uses bronchial rx.      Uses niv and o2 every night for sleep and for naps 1-2 daily.  Feels like worsening.              Sept 9, 2016HPI: worse off advair and spiriva, irratents worsen, niv used 12/24 daily, cannot bend over and uc doing poorly due to abd pain.        The chief compliant  problem is worsening  PFSH:  Past Medical History:   Diagnosis Date    Arthritis     Asthma     Chronic bronchitis     Emphysema of lung     Type 2 diabetes mellitus with hyperlipidemia 1/25/2018         History reviewed. No pertinent surgical history.  Social History   Substance Use Topics    Smoking status: Former Smoker     Packs/day: 1.00     Years: 15.00     Types: Cigarettes     Quit date: 6/6/2003    Smokeless tobacco: Never Used    Alcohol use 15.0 oz/week     25 Cans of beer per week     Family History   Problem Relation Age of Onset    Stroke Mother     No Known Problems Father     Pneumonia Sister     Stroke Brother     No Known Problems Maternal Aunt     Stroke Paternal Aunt     Cancer Paternal Uncle     Cancer Maternal Grandmother     Heart failure Maternal Grandfather     Stroke Paternal Grandmother     No Known Problems Paternal Grandfather     Stroke Brother     No Known Problems Brother     No Known Problems Brother      Review of patient's allergies indicates:   Allergen Reactions    Sulfa (sulfonamide antibiotics) Rash       Performance Status:The patient's  "activity level is housebound activities.      Review of Systems:  a review of eleven systems covering constitutional, Eye, HEENT, Psych, Respiratory, Cardiac, GI, , Musculoskeletal, Endocrine, Dermatologic was negative except for pertinent findings as listed ABOVE and below: all good except r elbow arthritis and neck pain     Exam:Comprehensive exam done.   /79 (BP Location: Right arm, Patient Position: Sitting)   Pulse 99   Ht 5' 10" (1.778 m)   Wt 119.2 kg (262 lb 12.6 oz)   SpO2 (!) 94%   BMI 37.71 kg/m²   Exam included Vitals as listed, and patient's appearance and affect and alertness and mood, oral exam for yeast and hygiene and pharynx lesions and Mallapatti (M) score, neck with inspection for jvd and masses and thyroid abnormalities and lymph nodes (supraclavicular and infraclavicular nodes and axillary also examined and noted if abn), chest exam included symmetry and effort and fremitus and percussion and auscultation, cardiac exam included rhythm and gallops and murmur and rubs and jvd and edema, abdominal exam for mass and hepatosplenomegaly and tenderness and hernias and bowel sounds, Musculoskeletal exam with muscle tone and posture and mobility/gait and  strength, and skin for rashes and cyanosis and pallor and turgor, extremity for clubbing.  Findings were normal except for pertinent findings listed below:  M3 and no diaphragm motion, good bs but shallow.    Radiographs reviewed: was not done     Labs was not done       PFT was not done    Plan:  Clinical impression is resonably certain and repeated evaluation prn +/- follow up will be needed as below.    Mina was seen today for annual exam, shortness of breath, asthma and copd.    Diagnoses and all orders for this visit:    Diaphragm paralysis    Chronic obstructive pulmonary disease, unspecified COPD type  -     tiotropium-olodaterol (STIOLTO RESPIMAT) 2.5-2.5 mcg/actuation Mist; Inhale 1 Inhaler into the lungs once daily.  -     " albuterol (PROVENTIL) 2.5 mg /3 mL (0.083 %) nebulizer solution; Take 3 mLs (2.5 mg total) by nebulization every 6 (six) hours as needed for Wheezing.  -     albuterol-ipratropium 2.5mg-0.5mg/3mL (DUO-NEB) 0.5 mg-3 mg(2.5 mg base)/3 mL nebulizer solution; Take 3 mLs by nebulization every 6 (six) hours as needed for Wheezing or Shortness of Breath. Rescue    Moderate persistent asthma, uncomplicated  -     montelukast (SINGULAIR) 10 mg tablet; Take 1 tablet (10 mg total) by mouth every evening.  -     VENTOLIN HFA 90 mcg/actuation inhaler; Inhale 1 puff into the lungs 4 (four) times daily as needed.  -     albuterol (PROVENTIL) 2.5 mg /3 mL (0.083 %) nebulizer solution; Take 3 mLs (2.5 mg total) by nebulization every 6 (six) hours as needed for Wheezing.  -     albuterol-ipratropium 2.5mg-0.5mg/3mL (DUO-NEB) 0.5 mg-3 mg(2.5 mg base)/3 mL nebulizer solution; Take 3 mLs by nebulization every 6 (six) hours as needed for Wheezing or Shortness of Breath. Rescue    COPD mixed type  -     montelukast (SINGULAIR) 10 mg tablet; Take 1 tablet (10 mg total) by mouth every evening.  -     VENTOLIN HFA 90 mcg/actuation inhaler; Inhale 1 puff into the lungs 4 (four) times daily as needed.  -     albuterol (PROVENTIL) 2.5 mg /3 mL (0.083 %) nebulizer solution; Take 3 mLs (2.5 mg total) by nebulization every 6 (six) hours as needed for Wheezing.  -     albuterol-ipratropium 2.5mg-0.5mg/3mL (DUO-NEB) 0.5 mg-3 mg(2.5 mg base)/3 mL nebulizer solution; Take 3 mLs by nebulization every 6 (six) hours as needed for Wheezing or Shortness of Breath. Rescue    Chronic respiratory failure, unspecified whether with hypoxia or hypercapnia        Follow-up in about 1 year (around 4/16/2019), or if symptoms worsen or fail to improve.    Discussed with patient above for education the following:      Cause of paralysis not clear, not likely reversable.    Pacer not likely helpful- reasonable to go to center for evaluation?    Plication of diaphragm  is invasive surgical, may not help much, useful for one side paralysis.    Could have eval at main campus?  Follow here later?          Nasal problems   Afrin one or 2 bedtime if stuffy, use flonase one each side- out to side.    Asthma Program may help get meds?? [place]       Sleep testing may help?    Mina Zelaya  Office Note    Chief Complaint   Patient presents with    Annual Exam    Shortness of Breath    Asthma    COPD   April 16, 2018 - still starr,     April 12, lives alone, sees grandson daily post school, having freq nose bleed, uses full face mask for niv.  Frustrated, marked starr.  jan 11, 2017- inactivity ppt weight gain, mood bad at times, daughter and grandson moved out and  Pt feeling down a bit.   Uses o2 and non invasive ventilator -- extreme air hunger with activity and supine. aspriates 2/day to 2 /week.  Uses bronchial rx.    Uses niv and o2 every night for sleep and for naps 1-2 daily.  Feels like worsening.  Sept 9, 2016HPI: worse off advair and spiriva, irratents worsen, niv used 12/24 daily, cannot bend over and uc doing poorly due to abd pain.  The chief compliant  problem is worsening  PFSH:  Past Medical History:   Diagnosis Date    Arthritis     Asthma     Chronic bronchitis     Emphysema of lung     Type 2 diabetes mellitus with hyperlipidemia 1/25/2018         History reviewed. No pertinent surgical history.  Social History   Substance Use Topics    Smoking status: Former Smoker     Packs/day: 1.00     Years: 15.00     Types: Cigarettes     Quit date: 6/6/2003    Smokeless tobacco: Never Used    Alcohol use 15.0 oz/week     25 Cans of beer per week     Family History   Problem Relation Age of Onset    Stroke Mother     No Known Problems Father     Pneumonia Sister     Stroke Brother     No Known Problems Maternal Aunt     Stroke Paternal Aunt     Cancer Paternal Uncle     Cancer Maternal Grandmother     Heart failure Maternal Grandfather     Stroke Paternal  "Grandmother     No Known Problems Paternal Grandfather     Stroke Brother     No Known Problems Brother     No Known Problems Brother      Review of patient's allergies indicates:   Allergen Reactions    Sulfa (sulfonamide antibiotics) Rash       Performance Status:The patient's activity level is housebound activities.      Review of Systems:  a review of eleven systems covering constitutional, Eye, HEENT, Psych, Respiratory, Cardiac, GI, , Musculoskeletal, Endocrine, Dermatologic was negative except for pertinent findings as listed ABOVE and below: all good except r elbow arthritis and neck pain     Exam:Comprehensive exam done.   /79 (BP Location: Right arm, Patient Position: Sitting)   Pulse 99   Ht 5' 10" (1.778 m)   Wt 119.2 kg (262 lb 12.6 oz)   SpO2 (!) 94%   BMI 37.71 kg/m²   Exam included Vitals as listed, and patient's appearance and affect and alertness and mood, oral exam for yeast and hygiene and pharynx lesions and Mallapatti (M) score, neck with inspection for jvd and masses and thyroid abnormalities and lymph nodes (supraclavicular and infraclavicular nodes and axillary also examined and noted if abn), chest exam included symmetry and effort and fremitus and percussion and auscultation, cardiac exam included rhythm and gallops and murmur and rubs and jvd and edema, abdominal exam for mass and hepatosplenomegaly and tenderness and hernias and bowel sounds, Musculoskeletal exam with muscle tone and posture and mobility/gait and  strength, and skin for rashes and cyanosis and pallor and turgor, extremity for clubbing.  Findings were normal except for pertinent findings listed below:  M3 and no diaphragm motion, good bs but shallow.  Diaphragm.    Radiographs reviewed: was not done     Labs  k low      PFT was not done recent    Plan:  Clinical impression is resonably certain and repeated evaluation prn +/- follow up will be needed as below.    Mina was seen today for annual exam, " shortness of breath, asthma and copd.    Diagnoses and all orders for this visit:    Diaphragm paralysis    Chronic obstructive pulmonary disease, unspecified COPD type  -     tiotropium-olodaterol (STIOLTO RESPIMAT) 2.5-2.5 mcg/actuation Mist; Inhale 1 Inhaler into the lungs once daily.  -     albuterol (PROVENTIL) 2.5 mg /3 mL (0.083 %) nebulizer solution; Take 3 mLs (2.5 mg total) by nebulization every 6 (six) hours as needed for Wheezing.  -     albuterol-ipratropium 2.5mg-0.5mg/3mL (DUO-NEB) 0.5 mg-3 mg(2.5 mg base)/3 mL nebulizer solution; Take 3 mLs by nebulization every 6 (six) hours as needed for Wheezing or Shortness of Breath. Rescue    Moderate persistent asthma, uncomplicated  -     montelukast (SINGULAIR) 10 mg tablet; Take 1 tablet (10 mg total) by mouth every evening.  -     VENTOLIN HFA 90 mcg/actuation inhaler; Inhale 1 puff into the lungs 4 (four) times daily as needed.  -     albuterol (PROVENTIL) 2.5 mg /3 mL (0.083 %) nebulizer solution; Take 3 mLs (2.5 mg total) by nebulization every 6 (six) hours as needed for Wheezing.  -     albuterol-ipratropium 2.5mg-0.5mg/3mL (DUO-NEB) 0.5 mg-3 mg(2.5 mg base)/3 mL nebulizer solution; Take 3 mLs by nebulization every 6 (six) hours as needed for Wheezing or Shortness of Breath. Rescue    COPD mixed type  -     montelukast (SINGULAIR) 10 mg tablet; Take 1 tablet (10 mg total) by mouth every evening.  -     VENTOLIN HFA 90 mcg/actuation inhaler; Inhale 1 puff into the lungs 4 (four) times daily as needed.  -     albuterol (PROVENTIL) 2.5 mg /3 mL (0.083 %) nebulizer solution; Take 3 mLs (2.5 mg total) by nebulization every 6 (six) hours as needed for Wheezing.  -     albuterol-ipratropium 2.5mg-0.5mg/3mL (DUO-NEB) 0.5 mg-3 mg(2.5 mg base)/3 mL nebulizer solution; Take 3 mLs by nebulization every 6 (six) hours as needed for Wheezing or Shortness of Breath. Rescue    Chronic respiratory failure, unspecified whether with hypoxia or hypercapnia        Follow-up  in about 1 year (around 4/16/2019), or if symptoms worsen or fail to improve.    Discussed with patient above for education the following:           Asthma/copd playing some role with breathing.    Need potassium corrected as best able.    Continue to use non invasive ventilator for sleep and during day - as you have been using.  Non invasive ventilator is helping you greatly.     Diaphragms paralysis is your main lung illness , causes your chronic respiratory failure.

## 2018-04-16 NOTE — PATIENT INSTRUCTIONS
Asthma/copd playing some role with breathing.    Need potassium corrected as best able.    Continue to use non invasive ventilator for sleep and during day - as you have been using.  Non invasive ventilator is helping you greatly.     Diaphragms paralysis is your main lung illness , causes your chronic respiratory failure.

## 2018-04-24 RX ORDER — ROSUVASTATIN CALCIUM 5 MG/1
TABLET, COATED ORAL
Qty: 90 TABLET | Refills: 3 | Status: SHIPPED | OUTPATIENT
Start: 2018-04-24 | End: 2019-05-03 | Stop reason: SDUPTHER

## 2018-05-08 DIAGNOSIS — E87.6 HYPOKALEMIA: ICD-10-CM

## 2018-05-08 RX ORDER — POTASSIUM CHLORIDE 20 MEQ/1
TABLET, EXTENDED RELEASE ORAL
Qty: 360 TABLET | Refills: 1 | Status: SHIPPED | OUTPATIENT
Start: 2018-05-08 | End: 2019-03-15 | Stop reason: SDUPTHER

## 2018-06-27 ENCOUNTER — CLINICAL SUPPORT (OUTPATIENT)
Dept: PRIMARY CARE CLINIC | Facility: CLINIC | Age: 53
End: 2018-06-27
Payer: MEDICARE

## 2018-06-27 ENCOUNTER — OFFICE VISIT (OUTPATIENT)
Dept: PRIMARY CARE CLINIC | Facility: CLINIC | Age: 53
End: 2018-06-27
Payer: MEDICARE

## 2018-06-27 VITALS
HEIGHT: 70 IN | DIASTOLIC BLOOD PRESSURE: 77 MMHG | WEIGHT: 265 LBS | SYSTOLIC BLOOD PRESSURE: 123 MMHG | TEMPERATURE: 98 F | BODY MASS INDEX: 37.94 KG/M2 | OXYGEN SATURATION: 94 % | RESPIRATION RATE: 18 BRPM | HEART RATE: 88 BPM

## 2018-06-27 DIAGNOSIS — R73.03 BORDERLINE DIABETES: ICD-10-CM

## 2018-06-27 DIAGNOSIS — Z11.59 NEED FOR HEPATITIS C SCREENING TEST: ICD-10-CM

## 2018-06-27 DIAGNOSIS — J44.9 COPD MIXED TYPE: Primary | ICD-10-CM

## 2018-06-27 DIAGNOSIS — E78.5 HYPERLIPIDEMIA, UNSPECIFIED HYPERLIPIDEMIA TYPE: ICD-10-CM

## 2018-06-27 DIAGNOSIS — Z23 NEED FOR VACCINATION: ICD-10-CM

## 2018-06-27 DIAGNOSIS — E87.6 HYPOKALEMIA: ICD-10-CM

## 2018-06-27 LAB
ANION GAP SERPL CALC-SCNC: 11 MMOL/L
BUN SERPL-MCNC: 14 MG/DL
CALCIUM SERPL-MCNC: 9.1 MG/DL
CHLORIDE SERPL-SCNC: 101 MMOL/L
CO2 SERPL-SCNC: 28 MMOL/L
CREAT SERPL-MCNC: 0.9 MG/DL
EST. GFR  (AFRICAN AMERICAN): >60 ML/MIN/1.73 M^2
EST. GFR  (NON AFRICAN AMERICAN): >60 ML/MIN/1.73 M^2
GLUCOSE SERPL-MCNC: 110 MG/DL
POTASSIUM SERPL-SCNC: 3.3 MMOL/L
SODIUM SERPL-SCNC: 140 MMOL/L

## 2018-06-27 PROCEDURE — 99213 OFFICE O/P EST LOW 20 MIN: CPT | Mod: S$GLB,,, | Performed by: FAMILY MEDICINE

## 2018-06-27 PROCEDURE — 90732 PPSV23 VACC 2 YRS+ SUBQ/IM: CPT | Mod: S$GLB,,, | Performed by: FAMILY MEDICINE

## 2018-06-27 PROCEDURE — 99999 PR PBB SHADOW E&M-EST. PATIENT-LVL III: CPT | Mod: PBBFAC,,, | Performed by: FAMILY MEDICINE

## 2018-06-27 PROCEDURE — 3008F BODY MASS INDEX DOCD: CPT | Mod: CPTII,S$GLB,, | Performed by: FAMILY MEDICINE

## 2018-06-27 PROCEDURE — G0009 ADMIN PNEUMOCOCCAL VACCINE: HCPCS | Mod: S$GLB,,, | Performed by: FAMILY MEDICINE

## 2018-06-27 PROCEDURE — 99999 PR PBB SHADOW E&M-EST. PATIENT-LVL II: CPT | Mod: PBBFAC,,,

## 2018-06-27 NOTE — PROGRESS NOTES
"Subjective:       Patient ID: Mina Zelaya is a 52 y.o. male.    Chief Complaint: Disability (patient says he is here to have his disability paperwork filled )    Chronic dyspnea due to COPD/diaphragmatic paralysis/chronic respiratory failure.  Stable on current regimen.  Use of noninvasive ventilator nightly with good benefit.  No recent exacerbations or setbacks.  To his knowledge, he has never had pneumococcal vaccination.      Review of Systems   Constitutional: Positive for fatigue. Negative for fever.   HENT: Negative for trouble swallowing.    Respiratory: Positive for shortness of breath. Negative for cough.    Cardiovascular: Negative for chest pain.   Gastrointestinal: Negative for nausea and vomiting.   Genitourinary: Negative for difficulty urinating.   Musculoskeletal: Positive for arthralgias and back pain.       Objective:      Vitals:    06/27/18 0832   BP: 123/77   BP Location: Left arm   Patient Position: Sitting   BP Method: Large (Automatic)   Pulse: 88   Resp: 18   Temp: 98.2 °F (36.8 °C)   TempSrc: Oral   SpO2: (!) 94%   Weight: 120.2 kg (265 lb)   Height: 5' 10" (1.778 m)     Physical Exam   Constitutional: He is oriented to person, place, and time. He appears well-developed and well-nourished.   HENT:   Head: Normocephalic and atraumatic.   Cardiovascular: Normal rate, regular rhythm and normal heart sounds.    Pulmonary/Chest: Effort normal. No respiratory distress. He has decreased breath sounds. He has no wheezes. He has no rhonchi. He has no rales.   Musculoskeletal: He exhibits no edema.   Neurological: He is alert and oriented to person, place, and time.   Skin: Skin is warm and dry.   Nursing note and vitals reviewed.      Assessment:       1. COPD mixed type    2. Borderline diabetes    3. Hypokalemia    4. Hyperlipidemia, unspecified hyperlipidemia type    5. Need for hepatitis C screening test    6. Need for vaccination        Plan:       COPD mixed type  Comments:  Stable, " long-term disability paperwork completed  Orders:  -     CBC auto differential; Future; Expected date: 09/27/2018    Borderline diabetes  -     Hemoglobin A1c; Future; Expected date: 09/27/2018    Hypokalemia    Hyperlipidemia, unspecified hyperlipidemia type  -     Comprehensive metabolic panel; Future; Expected date: 09/27/2018  -     Lipid panel; Future; Expected date: 09/27/2018    Need for hepatitis C screening test  -     Hepatitis C antibody; Future; Expected date: 09/27/2018    Need for vaccination  -     Pneumococcal Polysaccharide Vaccine (23 Valent) (SQ/IM)      Medication List with Changes/Refills   Current Medications    ADALIMUMAB (HUMIRA) 40 MG/0.8 ML SYKT INJECTION    Inject 40 mg into the skin every 14 (fourteen) days.    ALBUTEROL (PROVENTIL) 2.5 MG /3 ML (0.083 %) NEBULIZER SOLUTION    Take 3 mLs (2.5 mg total) by nebulization every 6 (six) hours as needed for Wheezing.    ALBUTEROL-IPRATROPIUM 2.5MG-0.5MG/3ML (DUO-NEB) 0.5 MG-3 MG(2.5 MG BASE)/3 ML NEBULIZER SOLUTION    Take 3 mLs by nebulization every 6 (six) hours as needed for Wheezing or Shortness of Breath. Rescue    ALLOPURINOL (ZYLOPRIM) 100 MG TABLET    TAKE 1 TABLET EVERY DAY    ALLOPURINOL (ZYLOPRIM) 300 MG TABLET    TAKE 1 TABLET EVERY DAY    ASPIRIN (ASPIR-81) 81 MG EC TABLET    Take 81 mg by mouth once daily.    BUMETANIDE (BUMEX) 2 MG TABLET    Take 1 tablet (2 mg total) by mouth once daily.    CALCIUM CARBONATE (OS-VÍCTOR) 600 MG (1,500 MG) TAB    Take 600 mg by mouth once daily.    DIAZEPAM (VALIUM) 10 MG TAB    TK 1/2 T PO BID PRN    FISH OIL-OMEGA-3 FATTY ACIDS 300-1,000 MG CAPSULE    Take 1,200 mg by mouth once daily.    HYDROCODONE-ACETAMINOPHEN 10-325MG (NORCO)  MG TAB    Take 1 tablet by mouth every 8 (eight) hours as needed for Pain.    IBUPROFEN (ADVIL,MOTRIN) 800 MG TABLET    TAKE 1 TABLET THREE TIMES DAILY AS NEEDED    MONTELUKAST (SINGULAIR) 10 MG TABLET    Take 1 tablet (10 mg total) by mouth every evening.     POTASSIUM CHLORIDE SA (K-DUR,KLOR-CON) 20 MEQ TABLET    TAKE 2 TABLETS TWICE DAILY    ROSUVASTATIN (CRESTOR) 5 MG TABLET    TAKE 1 TABLET BY MOUTH EVERY DAY    TIOTROPIUM-OLODATEROL (STIOLTO RESPIMAT) 2.5-2.5 MCG/ACTUATION MIST    Inhale 1 Inhaler into the lungs once daily.    TRAZODONE (DESYREL) 100 MG TABLET    TAKE 1 TABLET AT BEDTIME    VENTOLIN HFA 90 MCG/ACTUATION INHALER    Inhale 1 puff into the lungs 4 (four) times daily as needed.

## 2018-06-27 NOTE — PROGRESS NOTES
Pt ID verified by  and Name. Allergies verified with pt. Pneumovax 23 0.5mL vaccine administered IM to R Deltoid. Pt tolerated well. No adverse reactions noted.

## 2018-07-16 ENCOUNTER — OFFICE VISIT (OUTPATIENT)
Dept: PRIMARY CARE CLINIC | Facility: CLINIC | Age: 53
End: 2018-07-16
Payer: MEDICARE

## 2018-07-16 VITALS
OXYGEN SATURATION: 98 % | RESPIRATION RATE: 18 BRPM | TEMPERATURE: 99 F | SYSTOLIC BLOOD PRESSURE: 134 MMHG | WEIGHT: 264.19 LBS | HEIGHT: 66 IN | HEART RATE: 74 BPM | BODY MASS INDEX: 42.46 KG/M2 | DIASTOLIC BLOOD PRESSURE: 75 MMHG

## 2018-07-16 DIAGNOSIS — T22.212D PARTIAL THICKNESS BURN OF LEFT FOREARM, SUBSEQUENT ENCOUNTER: ICD-10-CM

## 2018-07-16 DIAGNOSIS — S61.412D LACERATION OF LEFT HAND WITHOUT FOREIGN BODY, SUBSEQUENT ENCOUNTER: Primary | ICD-10-CM

## 2018-07-16 PROCEDURE — 99213 OFFICE O/P EST LOW 20 MIN: CPT | Mod: S$GLB,,, | Performed by: INTERNAL MEDICINE

## 2018-07-16 PROCEDURE — 3008F BODY MASS INDEX DOCD: CPT | Mod: CPTII,S$GLB,, | Performed by: INTERNAL MEDICINE

## 2018-07-16 PROCEDURE — 99999 PR PBB SHADOW E&M-EST. PATIENT-LVL III: CPT | Mod: PBBFAC,,, | Performed by: INTERNAL MEDICINE

## 2018-07-16 RX ORDER — CEPHALEXIN 500 MG/1
500 TABLET ORAL 4 TIMES DAILY
Qty: 28 TABLET | Refills: 0 | Status: SHIPPED | OUTPATIENT
Start: 2018-07-16 | End: 2018-07-23

## 2018-07-16 RX ORDER — MUPIROCIN 20 MG/G
OINTMENT TOPICAL 2 TIMES DAILY
Qty: 22 G | Refills: 1 | Status: SHIPPED | OUTPATIENT
Start: 2018-07-16 | End: 2018-10-01
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
## 2018-07-23 ENCOUNTER — CLINICAL SUPPORT (OUTPATIENT)
Dept: PRIMARY CARE CLINIC | Facility: CLINIC | Age: 53
End: 2018-07-23
Payer: MEDICARE

## 2018-07-23 PROCEDURE — 99999 PR PBB SHADOW E&M-EST. PATIENT-LVL I: CPT | Mod: PBBFAC,,,

## 2018-07-23 NOTE — PROGRESS NOTES
Patient verified by name and . Four sutures removed from L hand. Wound clean dry and intact. Antibiotic ointment and bandage applied.

## 2018-09-18 ENCOUNTER — CLINICAL SUPPORT (OUTPATIENT)
Dept: PRIMARY CARE CLINIC | Facility: CLINIC | Age: 53
End: 2018-09-18
Payer: MEDICARE

## 2018-09-18 DIAGNOSIS — Z11.59 NEED FOR HEPATITIS C SCREENING TEST: ICD-10-CM

## 2018-09-18 DIAGNOSIS — R73.03 BORDERLINE DIABETES: ICD-10-CM

## 2018-09-18 DIAGNOSIS — J44.9 COPD MIXED TYPE: ICD-10-CM

## 2018-09-18 DIAGNOSIS — E78.5 HYPERLIPIDEMIA, UNSPECIFIED HYPERLIPIDEMIA TYPE: ICD-10-CM

## 2018-09-18 LAB
ALBUMIN SERPL BCP-MCNC: 4.3 G/DL
ALP SERPL-CCNC: 66 U/L
ALT SERPL W/O P-5'-P-CCNC: 44 U/L
ANION GAP SERPL CALC-SCNC: 9 MMOL/L
AST SERPL-CCNC: 63 U/L
BASOPHILS # BLD AUTO: 0 K/UL
BASOPHILS NFR BLD: 0.3 %
BILIRUB SERPL-MCNC: 0.7 MG/DL
BUN SERPL-MCNC: 13 MG/DL
CALCIUM SERPL-MCNC: 9.1 MG/DL
CHLORIDE SERPL-SCNC: 99 MMOL/L
CHOLEST SERPL-MCNC: 214 MG/DL
CHOLEST/HDLC SERPL: 2.8 {RATIO}
CO2 SERPL-SCNC: 31 MMOL/L
CREAT SERPL-MCNC: 0.8 MG/DL
DIFFERENTIAL METHOD: ABNORMAL
EOSINOPHIL # BLD AUTO: 0.2 K/UL
EOSINOPHIL NFR BLD: 2.2 %
ERYTHROCYTE [DISTWIDTH] IN BLOOD BY AUTOMATED COUNT: 14.1 %
EST. GFR  (AFRICAN AMERICAN): >60 ML/MIN/1.73 M^2
EST. GFR  (NON AFRICAN AMERICAN): >60 ML/MIN/1.73 M^2
ESTIMATED AVG GLUCOSE: 123 MG/DL
GLUCOSE SERPL-MCNC: 126 MG/DL
HBA1C MFR BLD HPLC: 5.9 %
HCT VFR BLD AUTO: 42.6 %
HDLC SERPL-MCNC: 77 MG/DL
HDLC SERPL: 36 %
HGB BLD-MCNC: 14.1 G/DL
LDLC SERPL CALC-MCNC: 120 MG/DL
LYMPHOCYTES # BLD AUTO: 1.8 K/UL
LYMPHOCYTES NFR BLD: 24.6 %
MCH RBC QN AUTO: 31 PG
MCHC RBC AUTO-ENTMCNC: 33.2 G/DL
MCV RBC AUTO: 93 FL
MONOCYTES # BLD AUTO: 0.8 K/UL
MONOCYTES NFR BLD: 11.5 %
NEUTROPHILS # BLD AUTO: 4.5 K/UL
NEUTROPHILS NFR BLD: 61.4 %
NONHDLC SERPL-MCNC: 137 MG/DL
PLATELET # BLD AUTO: 286 K/UL
PMV BLD AUTO: 8.9 FL
POTASSIUM SERPL-SCNC: 3.9 MMOL/L
PROT SERPL-MCNC: 8.2 G/DL
RBC # BLD AUTO: 4.57 M/UL
SODIUM SERPL-SCNC: 139 MMOL/L
TRIGL SERPL-MCNC: 83 MG/DL
WBC # BLD AUTO: 7.3 K/UL

## 2018-09-18 PROCEDURE — 86803 HEPATITIS C AB TEST: CPT

## 2018-09-18 PROCEDURE — 99212 OFFICE O/P EST SF 10 MIN: CPT | Mod: PBBFAC,PN

## 2018-09-18 PROCEDURE — 99999 PR PBB SHADOW E&M-EST. PATIENT-LVL II: CPT | Mod: PBBFAC,,,

## 2018-09-18 PROCEDURE — 83036 HEMOGLOBIN GLYCOSYLATED A1C: CPT

## 2018-09-19 LAB — HCV AB SERPL QL IA: NEGATIVE

## 2018-10-01 ENCOUNTER — OFFICE VISIT (OUTPATIENT)
Dept: PRIMARY CARE CLINIC | Facility: CLINIC | Age: 53
End: 2018-10-01
Payer: MEDICARE

## 2018-10-01 VITALS
HEIGHT: 71 IN | BODY MASS INDEX: 37.1 KG/M2 | RESPIRATION RATE: 16 BRPM | DIASTOLIC BLOOD PRESSURE: 73 MMHG | SYSTOLIC BLOOD PRESSURE: 117 MMHG | OXYGEN SATURATION: 94 % | WEIGHT: 265 LBS | HEART RATE: 88 BPM | TEMPERATURE: 99 F

## 2018-10-01 DIAGNOSIS — F41.9 ANXIETY: ICD-10-CM

## 2018-10-01 DIAGNOSIS — Z23 NEED FOR VACCINATION: ICD-10-CM

## 2018-10-01 DIAGNOSIS — M51.36 DDD (DEGENERATIVE DISC DISEASE), LUMBAR: Primary | ICD-10-CM

## 2018-10-01 DIAGNOSIS — F10.90 HEAVY ALCOHOL CONSUMPTION: ICD-10-CM

## 2018-10-01 DIAGNOSIS — Z23 NEED FOR PROPHYLACTIC VACCINATION AND INOCULATION AGAINST INFLUENZA: ICD-10-CM

## 2018-10-01 DIAGNOSIS — K51.919 ULCERATIVE COLITIS WITH COMPLICATION, UNSPECIFIED LOCATION: ICD-10-CM

## 2018-10-01 DIAGNOSIS — R73.03 BORDERLINE DIABETES: ICD-10-CM

## 2018-10-01 PROCEDURE — 99214 OFFICE O/P EST MOD 30 MIN: CPT | Mod: S$PBB,,, | Performed by: FAMILY MEDICINE

## 2018-10-01 PROCEDURE — 99999 PR PBB SHADOW E&M-EST. PATIENT-LVL III: CPT | Mod: PBBFAC,,, | Performed by: FAMILY MEDICINE

## 2018-10-01 PROCEDURE — 3008F BODY MASS INDEX DOCD: CPT | Mod: CPTII,,, | Performed by: FAMILY MEDICINE

## 2018-10-01 PROCEDURE — 99213 OFFICE O/P EST LOW 20 MIN: CPT | Mod: PBBFAC,PN | Performed by: FAMILY MEDICINE

## 2018-10-01 PROCEDURE — 90686 IIV4 VACC NO PRSV 0.5 ML IM: CPT | Mod: PBBFAC,PN

## 2018-10-01 RX ORDER — DIAZEPAM 10 MG/1
TABLET ORAL
Qty: 30 TABLET | Refills: 0 | Status: SHIPPED | OUTPATIENT
Start: 2018-10-01 | End: 2019-06-09 | Stop reason: SDUPTHER

## 2018-10-01 RX ORDER — METFORMIN HYDROCHLORIDE 500 MG/1
500 TABLET, EXTENDED RELEASE ORAL
Qty: 90 TABLET | Refills: 1 | Status: SHIPPED | OUTPATIENT
Start: 2018-10-01 | End: 2019-04-10 | Stop reason: SDUPTHER

## 2018-10-01 RX ORDER — HYDROCODONE BITARTRATE AND ACETAMINOPHEN 10; 325 MG/1; MG/1
1 TABLET ORAL EVERY 8 HOURS PRN
Qty: 90 TABLET | Refills: 0 | Status: SHIPPED | OUTPATIENT
Start: 2018-10-01 | End: 2019-06-25

## 2018-10-01 NOTE — PROGRESS NOTES
"Subjective:       Patient ID: Mina Zelaya is a 53 y.o. male.    Chief Complaint: Pre-diabetes (patient is here to discuss lab results) and DDD (medication refill)    Lumbar degenerative disc disease-chronic, stable, takes pain meds as needed.  Last prescription refills several months ago.  Anxiety-takes diazepam very infrequently, last prescription refills almost 2 years ago, no adverse side effects.  Ulcerative colitis-no recent flares, no melena or hematochezia.  Admits that his diet is suboptimal.  Still drinks a moderately heavy amount of alcohol, at least 1-2 beers a day, sometimes 6-12 beers  Borderline diabetes-A1c over 6, fasting glucose 126.  Not eating particularly healthy diet      Review of Systems   Constitutional: Negative for fever.   HENT: Negative for trouble swallowing.    Eyes: Negative for visual disturbance.   Respiratory: Positive for shortness of breath.    Cardiovascular: Negative for chest pain.   Gastrointestinal: Negative for anal bleeding.   Endocrine: Negative for polydipsia and polyuria.   Genitourinary: Negative for difficulty urinating.   Musculoskeletal: Positive for back pain. Negative for joint swelling.   Skin: Negative for rash and wound.   Neurological: Negative for dizziness.   Hematological: Does not bruise/bleed easily.   Psychiatric/Behavioral: Negative for agitation, self-injury and suicidal ideas. The patient is nervous/anxious.        Objective:      Vitals:    10/01/18 0815   BP: 117/73   BP Location: Left arm   Patient Position: Sitting   BP Method: Large (Automatic)   Pulse: 88   Resp: 16   Temp: 99 °F (37.2 °C)   TempSrc: Oral   SpO2: (!) 94%   Weight: 120.2 kg (265 lb)   Height: 5' 10.5" (1.791 m)     Lab Results   Component Value Date    WBC 7.30 09/18/2018    HGB 14.1 09/18/2018    HCT 42.6 09/18/2018     09/18/2018    CHOL 214 (H) 09/18/2018    TRIG 83 09/18/2018    HDL 77 (H) 09/18/2018    ALT 44 09/18/2018    AST 63 (H) 09/18/2018     09/18/2018 "    K 3.9 09/18/2018    CL 99 (L) 09/18/2018    CREATININE 0.8 09/18/2018    BUN 13 09/18/2018    CO2 31 (H) 09/18/2018    TSH 1.31 02/01/2018    PSA 0.88 02/01/2018    HGBA1C 5.9 (H) 09/18/2018     Physical Exam   Constitutional: He is oriented to person, place, and time. He appears well-developed and well-nourished.   HENT:   Head: Normocephalic and atraumatic.   Cardiovascular: Normal rate, regular rhythm and normal heart sounds.   Pulmonary/Chest: Effort normal and breath sounds normal.   Musculoskeletal: He exhibits no edema.        Lumbar back: He exhibits decreased range of motion. He exhibits no deformity.   Neurological: He is alert and oriented to person, place, and time.   Skin: Skin is warm and dry.   Nursing note and vitals reviewed.      Assessment:       1. DDD (degenerative disc disease), lumbar    2. Borderline diabetes    3. Ulcerative colitis with complication, unspecified location    4. Anxiety    5. Heavy alcohol consumption    6. Need for vaccination    7. Need for prophylactic vaccination and inoculation against influenza        Plan:       DDD (degenerative disc disease), lumbar  Comments:  stable on current Rx  Orders:  -     HYDROcodone-acetaminophen (NORCO)  mg per tablet; Take 1 tablet by mouth every 8 (eight) hours as needed for Pain.  Dispense: 90 tablet; Refill: 0    Borderline diabetes  Comments:  start low-dose metformin, limit CHO intake  Orders:  -     metFORMIN (GLUCOPHAGE-XR) 500 MG 24 hr tablet; Take 1 tablet (500 mg total) by mouth daily with breakfast.  Dispense: 90 tablet; Refill: 1  -     Basic metabolic panel; Future; Expected date: 01/01/2019  -     Hemoglobin A1c; Future; Expected date: 01/01/2019    Ulcerative colitis with complication, unspecified location  Comments:  relatively stable, no recent flare ups    Anxiety  Comments:  stable, takes Valium very infrequently  Orders:  -     diazePAM (VALIUM) 10 MG Tab; 1 tablet daily as needed for anxiety  Dispense: 30  tablet; Refill: 0    Heavy alcohol consumption  Comments:  encouraged to cut back on EtOH, will help with weight loss and blood sugar    Need for vaccination  Comments:  flu shot today    Need for prophylactic vaccination and inoculation against influenza  -     Flu Vaccine - Quadrivalent (PF) (3 years & older)         Medication List           Accurate as of 10/1/18  8:54 AM. If you have any questions, ask your nurse or doctor.               START taking these medications    metFORMIN 500 MG 24 hr tablet  Commonly known as:  GLUCOPHAGE-XR  Take 1 tablet (500 mg total) by mouth daily with breakfast.  Started by:  Otto Bruce MD        CHANGE how you take these medications    diazePAM 10 MG Tab  Commonly known as:  VALIUM  1 tablet daily as needed for anxiety  What changed:  See the new instructions.  Changed by:  Otto Bruce MD        CONTINUE taking these medications    albuterol-ipratropium 2.5 mg-0.5 mg/3 mL nebulizer solution  Commonly known as:  DUO-NEB  Take 3 mLs by nebulization every 6 (six) hours as needed for Wheezing or Shortness of Breath. Rescue     * allopurinol 100 MG tablet  Commonly known as:  ZYLOPRIM  TAKE 1 TABLET EVERY DAY     * allopurinol 300 MG tablet  Commonly known as:  ZYLOPRIM  TAKE 1 TABLET EVERY DAY     ASPIR-81 81 MG EC tablet  Generic drug:  aspirin     bumetanide 2 MG tablet  Commonly known as:  BUMEX  Take 1 tablet (2 mg total) by mouth once daily.     calcium carbonate 600 mg calcium (1,500 mg) Tab  Commonly known as:  OS-VÍCTOR     fish oil-omega-3 fatty acids 300-1,000 mg capsule     HUMIRA 40 mg/0.8 mL Sykt injection  Generic drug:  adalimumab     HYDROcodone-acetaminophen  mg per tablet  Commonly known as:  NORCO  Take 1 tablet by mouth every 8 (eight) hours as needed for Pain.     ibuprofen 800 MG tablet  Commonly known as:  ADVIL,MOTRIN  TAKE 1 TABLET THREE TIMES DAILY AS NEEDED     montelukast 10 mg tablet  Commonly known as:  SINGULAIR  Take 1 tablet (10 mg  total) by mouth every evening.     potassium chloride SA 20 MEQ tablet  Commonly known as:  K-DUR,KLOR-CON  TAKE 2 TABLETS TWICE DAILY     rosuvastatin 5 MG tablet  Commonly known as:  CRESTOR  TAKE 1 TABLET BY MOUTH EVERY DAY     tiotropium-olodaterol 2.5-2.5 mcg/actuation Mist  Commonly known as:  STIOLTO RESPIMAT  Inhale 1 Inhaler into the lungs once daily.     traZODone 100 MG tablet  Commonly known as:  DESYREL  TAKE 1 TABLET AT BEDTIME     * VENTOLIN HFA 90 mcg/actuation inhaler  Generic drug:  albuterol  Inhale 1 puff into the lungs 4 (four) times daily as needed.     * albuterol 2.5 mg /3 mL (0.083 %) nebulizer solution  Commonly known as:  PROVENTIL  Take 3 mLs (2.5 mg total) by nebulization every 6 (six) hours as needed for Wheezing.         * This list has 4 medication(s) that are the same as other medications prescribed for you. Read the directions carefully, and ask your doctor or other care provider to review them with you.            STOP taking these medications    mupirocin 2 % ointment  Commonly known as:  BACTROBAN  Stopped by:  Otto Bruce MD           Where to Get Your Medications      These medications were sent to [id number] [address] AT [facility] [address] [address]    Phone:  [phone]   · diazePAM 10 MG Tab  · HYDROcodone-acetaminophen  mg per tablet  · metFORMIN 500 MG 24 hr tablet

## 2018-10-05 RX ORDER — BUMETANIDE 2 MG/1
2 TABLET ORAL DAILY
Qty: 7 TABLET | Refills: 0 | Status: SHIPPED | OUTPATIENT
Start: 2018-10-05 | End: 2018-10-05 | Stop reason: SDUPTHER

## 2018-10-05 RX ORDER — BUMETANIDE 2 MG/1
TABLET ORAL
Qty: 90 TABLET | Refills: 1 | Status: SHIPPED | OUTPATIENT
Start: 2018-10-05 | End: 2018-12-24 | Stop reason: SDUPTHER

## 2018-10-05 NOTE — TELEPHONE ENCOUNTER
----- Message from Moe Hernandez sent at 10/5/2018 10:05 AM CDT -----  Contact: patient  Type:  RX Refill Request    Who Called:  patient  Refill or New Rx:  refill  RX Name and Strength:  bumetanide (BUMEX) 2 MG tablet  How is the patient currently taking it? (ex. 1XDay):    Is this a 30 day or 90 day RX:    Preferred Pharmacy with phone number:  [facility]  Local or Mail Order:  local  Ordering Provider:    [facility] Call Back Number:  [phone]  Additional Information:  Requesting a call back when script  Has been sent

## 2018-10-12 RX ORDER — ALLOPURINOL 300 MG/1
TABLET ORAL
Qty: 90 TABLET | Refills: 1 | Status: SHIPPED | OUTPATIENT
Start: 2018-10-12 | End: 2019-03-15 | Stop reason: SDUPTHER

## 2018-10-15 ENCOUNTER — TELEPHONE (OUTPATIENT)
Dept: PRIMARY CARE CLINIC | Facility: CLINIC | Age: 53
End: 2018-10-15

## 2018-10-15 RX ORDER — SILDENAFIL 100 MG/1
100 TABLET, FILM COATED ORAL DAILY PRN
Qty: 10 TABLET | Refills: 11 | Status: SHIPPED | OUTPATIENT
Start: 2018-10-15 | End: 2020-08-13

## 2018-10-15 NOTE — TELEPHONE ENCOUNTER
----- Message from Ratna Reddy sent at 10/15/2018  3:18 PM CDT -----  Contact: [phone]  Type: Needs Medical Advice    Who Called:  Patient  Best Call Back Number: [phone]  Additional Information: Patient is requesting samples or prescription for medication: Viagra/not sure if needs appointment/stated he was recently seen by doctor/please call back to advise.

## 2018-10-15 NOTE — TELEPHONE ENCOUNTER
Called and notified patient that prescription for Viagra to [facility]. States understanding

## 2018-11-03 RX ORDER — ALLOPURINOL 100 MG/1
TABLET ORAL
Qty: 90 TABLET | Refills: 1 | Status: SHIPPED | OUTPATIENT
Start: 2018-11-03 | End: 2019-03-15 | Stop reason: SDUPTHER

## 2018-11-07 ENCOUNTER — OFFICE VISIT (OUTPATIENT)
Dept: PRIMARY CARE CLINIC | Facility: CLINIC | Age: 53
End: 2018-11-07
Payer: MEDICARE

## 2018-11-07 VITALS
HEIGHT: 71 IN | BODY MASS INDEX: 36.72 KG/M2 | TEMPERATURE: 99 F | OXYGEN SATURATION: 95 % | SYSTOLIC BLOOD PRESSURE: 116 MMHG | WEIGHT: 262.31 LBS | RESPIRATION RATE: 18 BRPM | DIASTOLIC BLOOD PRESSURE: 68 MMHG | HEART RATE: 77 BPM

## 2018-11-07 DIAGNOSIS — J44.1 COPD WITH ACUTE EXACERBATION: Primary | ICD-10-CM

## 2018-11-07 PROCEDURE — 99999 PR PBB SHADOW E&M-EST. PATIENT-LVL V: CPT | Mod: PBBFAC,,, | Performed by: NURSE PRACTITIONER

## 2018-11-07 PROCEDURE — 96372 THER/PROPH/DIAG INJ SC/IM: CPT | Mod: S$GLB,,, | Performed by: NURSE PRACTITIONER

## 2018-11-07 PROCEDURE — 99214 OFFICE O/P EST MOD 30 MIN: CPT | Mod: 25,S$GLB,, | Performed by: NURSE PRACTITIONER

## 2018-11-07 PROCEDURE — 3008F BODY MASS INDEX DOCD: CPT | Mod: CPTII,S$GLB,, | Performed by: NURSE PRACTITIONER

## 2018-11-07 RX ORDER — BETAMETHASONE SODIUM PHOSPHATE AND BETAMETHASONE ACETATE 3; 3 MG/ML; MG/ML
12 INJECTION, SUSPENSION INTRA-ARTICULAR; INTRALESIONAL; INTRAMUSCULAR; SOFT TISSUE
Status: COMPLETED | OUTPATIENT
Start: 2018-11-07 | End: 2018-11-07

## 2018-11-07 RX ORDER — METHYLPREDNISOLONE 4 MG/1
TABLET ORAL
Qty: 1 PACKAGE | Refills: 0 | Status: SHIPPED | OUTPATIENT
Start: 2018-11-07 | End: 2019-03-12

## 2018-11-07 RX ORDER — MINOCYCLINE HYDROCHLORIDE 100 MG/1
100 CAPSULE ORAL 2 TIMES DAILY
Qty: 20 CAPSULE | Refills: 0 | Status: SHIPPED | OUTPATIENT
Start: 2018-11-07 | End: 2018-11-17

## 2018-11-07 RX ADMIN — BETAMETHASONE SODIUM PHOSPHATE AND BETAMETHASONE ACETATE 12 MG: 3; 3 INJECTION, SUSPENSION INTRA-ARTICULAR; INTRALESIONAL; INTRAMUSCULAR; SOFT TISSUE at 01:11

## 2018-11-07 NOTE — PROGRESS NOTES
Chief Complaint  Chief Complaint   Patient presents with    Cough    Sore Throat       HPI  Mina Zelaya is a 53 y.o. male with multiple medical diagnoses as listed in the medical history and problem list that presents for congestion, cough, shortness of breath.    Patient reports the onset of symptoms approximately 4 days ago.  Reports nasal congestion.  Green productive cough.  Shortness of breath worsening.  Reports sore throat.  No fever or chills.  No nausea vomiting diarrhea.  Patient with history of COPD-emphysema.  Reports compliance with current prescription for Stiolto.  Not currently using albuterol nebulizers states that the last time he used with several days ago.      PAST MEDICAL HISTORY:  Past Medical History:   Diagnosis Date    Arthritis     Asthma     Chronic bronchitis     Emphysema of lung     Type 2 diabetes mellitus with hyperlipidemia 1/25/2018       PAST SURGICAL HISTORY:  History reviewed. No pertinent surgical history.    SOCIAL HISTORY:  Social History     Socioeconomic History    Marital status: Single     Spouse name: Not on file    Number of children: Not on file    Years of education: Not on file    Highest education level: Not on file   Social Needs    Financial resource strain: Not on file    Food insecurity - worry: Not on file    Food insecurity - inability: Not on file    Transportation needs - medical: Not on file    Transportation needs - non-medical: Not on file   Occupational History    Not on file   Tobacco Use    Smoking status: Former Smoker     Packs/day: 1.00     Years: 15.00     Pack years: 15.00     Types: Cigarettes     Last attempt to quit: 6/6/2003     Years since quitting: 15.4    Smokeless tobacco: Never Used   Substance and Sexual Activity    Alcohol use: Yes     Alcohol/week: 15.0 oz     Types: 25 Cans of beer per week    Drug use: No    Sexual activity: Not Currently     Partners: Female   Other Topics Concern    Not on file   Social  History Narrative    Not on file       FAMILY HISTORY:  Family History   Problem Relation Age of Onset    Stroke Mother     No Known Problems Father     Pneumonia Sister     Stroke Brother     No Known Problems Maternal Aunt     Stroke Paternal Aunt     Cancer Paternal Uncle     Cancer Maternal Grandmother     Heart failure Maternal Grandfather     Stroke Paternal Grandmother     No Known Problems Paternal Grandfather     Stroke Brother     No Known Problems Brother     No Known Problems Brother        ALLERGIES AND MEDICATIONS: updated and reviewed.  Review of patient's allergies indicates:   Allergen Reactions    Atorvastatin      myalgia    Sulfa (sulfonamide antibiotics) Rash     Current Outpatient Medications   Medication Sig Dispense Refill    adalimumab (HUMIRA) 40 mg/0.8 mL SyKt injection Inject 40 mg into the skin every 14 (fourteen) days.      albuterol (PROVENTIL) 2.5 mg /3 mL (0.083 %) nebulizer solution Take 3 mLs (2.5 mg total) by nebulization every 6 (six) hours as needed for Wheezing. 360 each 3    albuterol-ipratropium 2.5mg-0.5mg/3mL (DUO-NEB) 0.5 mg-3 mg(2.5 mg base)/3 mL nebulizer solution Take 3 mLs by nebulization every 6 (six) hours as needed for Wheezing or Shortness of Breath. Rescue 120 vial 5    allopurinol (ZYLOPRIM) 100 MG tablet TAKE 1 TABLET EVERY DAY 90 tablet 1    allopurinol (ZYLOPRIM) 300 MG tablet TAKE 1 TABLET EVERY DAY 90 tablet 1    aspirin (ASPIR-81) 81 MG EC tablet Take 81 mg by mouth once daily.      bumetanide (BUMEX) 2 MG tablet TAKE 1 TABLET(2 MG) BY MOUTH EVERY DAY 90 tablet 1    calcium carbonate (OS-VÍCTOR) 600 mg (1,500 mg) Tab Take 600 mg by mouth once daily.      diazePAM (VALIUM) 10 MG Tab 1 tablet daily as needed for anxiety 30 tablet 0    fish oil-omega-3 fatty acids 300-1,000 mg capsule Take 1,200 mg by mouth once daily.      HYDROcodone-acetaminophen (NORCO)  mg per tablet Take 1 tablet by mouth every 8 (eight) hours as needed for  Pain. 90 tablet 0    ibuprofen (ADVIL,MOTRIN) 800 MG tablet TAKE 1 TABLET THREE TIMES DAILY AS NEEDED 270 tablet 2    metFORMIN (GLUCOPHAGE-XR) 500 MG 24 hr tablet Take 1 tablet (500 mg total) by mouth daily with breakfast. 90 tablet 1    montelukast (SINGULAIR) 10 mg tablet Take 1 tablet (10 mg total) by mouth every evening. 90 tablet 3    potassium chloride SA (K-DUR,KLOR-CON) 20 MEQ tablet TAKE 2 TABLETS TWICE DAILY 360 tablet 1    rosuvastatin (CRESTOR) 5 MG tablet TAKE 1 TABLET BY MOUTH EVERY DAY 90 tablet 3    sildenafil (VIAGRA) 100 MG tablet Take 1 tablet (100 mg total) by mouth daily as needed for Erectile Dysfunction. 10 tablet 11    tiotropium-olodaterol (STIOLTO RESPIMAT) 2.5-2.5 mcg/actuation Mist Inhale 1 Inhaler into the lungs once daily. 4 g 11    traZODone (DESYREL) 100 MG tablet TAKE 1 TABLET AT BEDTIME 90 tablet 3    VENTOLIN HFA 90 mcg/actuation inhaler Inhale 1 puff into the lungs 4 (four) times daily as needed. 18 g 11    methylPREDNISolone (MEDROL DOSEPACK) 4 mg tablet use as directed 1 Package 0    minocycline (MINOCIN,DYNACIN) 100 MG capsule Take 1 capsule (100 mg total) by mouth 2 (two) times daily. for 10 days 20 capsule 0     Current Facility-Administered Medications   Medication Dose Route Frequency Provider Last Rate Last Dose    betamethasone acetate-betamethasone sodium phosphate injection 12 mg  12 mg Intramuscular 1 time in Clinic/HOD CATALINO Joshi  Review of Systems   Constitutional: Positive for fatigue. Negative for chills and fever.   HENT: Positive for congestion, rhinorrhea, sinus pressure and sore throat.    Respiratory: Positive for cough and shortness of breath. Negative for chest tightness and wheezing.    Cardiovascular: Negative for chest pain and palpitations.   Gastrointestinal: Negative for abdominal pain, blood in stool, diarrhea, nausea and vomiting.   Genitourinary: Negative for dysuria, frequency, hematuria and urgency.  "  Musculoskeletal: Negative for arthralgias and back pain.   Skin: Negative for rash and wound.   Neurological: Positive for headaches. Negative for dizziness.   Psychiatric/Behavioral: Negative for dysphoric mood and sleep disturbance. The patient is not nervous/anxious.          PHYSICAL EXAM  Vitals:    11/07/18 1249   BP: 116/68   BP Location: Right arm   Patient Position: Sitting   BP Method: Medium (Automatic)   Pulse: 77   Resp: 18   Temp: 98.6 °F (37 °C)   TempSrc: Oral   SpO2: 95%   Weight: 119 kg (262 lb 4.8 oz)   Height: 5' 10.5" (1.791 m)    Body mass index is 37.1 kg/m².  Weight: 119 kg (262 lb 4.8 oz)   Height: 5' 10.5" (179.1 cm)     Physical Exam   Constitutional: He is oriented to person, place, and time. He appears well-developed and well-nourished.   HENT:   Head: Normocephalic.   Right Ear: Tympanic membrane normal.   Left Ear: Tympanic membrane normal.   Mouth/Throat: Uvula is midline, oropharynx is clear and moist and mucous membranes are normal.   Eyes: Conjunctivae are normal.   Cardiovascular: Normal rate, regular rhythm, normal heart sounds and normal pulses.   No murmur heard.  Pulses:       Radial pulses are 2+ on the right side, and 2+ on the left side.   No LE swelling noted   Pulmonary/Chest: Effort normal and breath sounds normal. He has no wheezes.   Abdominal: Soft. Bowel sounds are normal. There is no tenderness.   Musculoskeletal: He exhibits no edema.   Lymphadenopathy:     He has no cervical adenopathy.   Neurological: He is alert and oriented to person, place, and time.   Skin: Skin is warm and dry. No rash noted.   Psychiatric: He has a normal mood and affect.         Health Maintenance       Date Due Completion Date    Foot Exam 08/30/1973 ---    Eye Exam 08/30/1973 ---    TETANUS VACCINE 08/30/1981 ---    Hemoglobin A1c 03/18/2019 9/18/2018    Urine Microalbumin 03/20/2019 3/20/2018    Lipid Panel 09/18/2019 9/18/2018    Low Dose Statin 11/07/2019 11/7/2018    Colonoscopy " 10/01/2026 10/1/2016 (Done)    Override on 10/1/2016: Done (Dr. Elias)    Pneumococcal PPSV23 (Medium Risk) (2) 08/30/2028 6/27/2018            Assessment & Plan    Mina was seen today for cough and sore throat.    Diagnoses and all orders for this visit:    COPD with acute exacerbation  -     X-Ray Chest PA And Lateral  -     betamethasone acetate-betamethasone sodium phosphate injection 12 mg  -     methylPREDNISolone (MEDROL DOSEPACK) 4 mg tablet; use as directed  -     minocycline (MINOCIN,DYNACIN) 100 MG capsule; Take 1 capsule (100 mg total) by mouth 2 (two) times daily. for 10 days          Follow-up: No Follow-up on file.

## 2018-12-24 RX ORDER — BUMETANIDE 2 MG/1
TABLET ORAL
Qty: 90 TABLET | Refills: 1 | Status: SHIPPED | OUTPATIENT
Start: 2018-12-24 | End: 2019-06-20 | Stop reason: SDUPTHER

## 2019-03-12 ENCOUNTER — OFFICE VISIT (OUTPATIENT)
Dept: PRIMARY CARE CLINIC | Facility: CLINIC | Age: 54
End: 2019-03-12
Payer: MEDICARE

## 2019-03-12 VITALS
SYSTOLIC BLOOD PRESSURE: 111 MMHG | TEMPERATURE: 99 F | HEART RATE: 83 BPM | OXYGEN SATURATION: 95 % | DIASTOLIC BLOOD PRESSURE: 66 MMHG | RESPIRATION RATE: 18 BRPM | WEIGHT: 261 LBS | HEIGHT: 71 IN | BODY MASS INDEX: 36.54 KG/M2

## 2019-03-12 DIAGNOSIS — L02.215 PERINEAL ABSCESS: Primary | ICD-10-CM

## 2019-03-12 DIAGNOSIS — Z23 NEED FOR VACCINATION: ICD-10-CM

## 2019-03-12 PROCEDURE — 99999 PR PBB SHADOW E&M-EST. PATIENT-LVL III: CPT | Mod: PBBFAC,,, | Performed by: FAMILY MEDICINE

## 2019-03-12 PROCEDURE — 90471 TD VACCINE GREATER THAN OR EQUAL TO 7YO PRESERVATIVE FREE IM: ICD-10-PCS | Mod: S$GLB,,, | Performed by: FAMILY MEDICINE

## 2019-03-12 PROCEDURE — 90471 IMMUNIZATION ADMIN: CPT | Mod: S$GLB,,, | Performed by: FAMILY MEDICINE

## 2019-03-12 PROCEDURE — 90714 TD VACCINE GREATER THAN OR EQUAL TO 7YO PRESERVATIVE FREE IM: ICD-10-PCS | Mod: S$GLB,,, | Performed by: FAMILY MEDICINE

## 2019-03-12 PROCEDURE — 3008F BODY MASS INDEX DOCD: CPT | Mod: CPTII,S$GLB,, | Performed by: FAMILY MEDICINE

## 2019-03-12 PROCEDURE — 99213 PR OFFICE/OUTPT VISIT, EST, LEVL III, 20-29 MIN: ICD-10-PCS | Mod: 25,S$GLB,, | Performed by: FAMILY MEDICINE

## 2019-03-12 PROCEDURE — 99213 OFFICE O/P EST LOW 20 MIN: CPT | Mod: 25,S$GLB,, | Performed by: FAMILY MEDICINE

## 2019-03-12 PROCEDURE — 3008F PR BODY MASS INDEX (BMI) DOCUMENTED: ICD-10-PCS | Mod: CPTII,S$GLB,, | Performed by: FAMILY MEDICINE

## 2019-03-12 PROCEDURE — 99999 PR PBB SHADOW E&M-EST. PATIENT-LVL III: ICD-10-PCS | Mod: PBBFAC,,, | Performed by: FAMILY MEDICINE

## 2019-03-12 PROCEDURE — 90714 TD VACC NO PRESV 7 YRS+ IM: CPT | Mod: S$GLB,,, | Performed by: FAMILY MEDICINE

## 2019-03-12 NOTE — PROGRESS NOTES
Patient ID verified by name and . Allergies reviewed with patient. Tetanus vaccine administered IM in left deltoid using aseptic technique. Aspirated with no blood noted. Patient tolerated well. Given per physicians order. No adverse reaction noted.

## 2019-03-12 NOTE — PROGRESS NOTES
"Subjective:       Patient ID: Mina Zelaya is a 53 y.o. male.    Chief Complaint: Follow-up (patient had an I&D done in the ED on Sunday and is here to follow up)    Patient was seen in the ER 2 days ago for right perineal abscess.  Underwent incision and drainage with packing.  Started on clindamycin.  Overall, feeling much better, though still very tender.  No fevers or chills.  No nausea, vomiting or diarrhea.      Review of Systems   Constitutional: Negative for chills and fever.   Respiratory: Positive for shortness of breath.    Cardiovascular: Negative for chest pain.   Gastrointestinal: Negative for diarrhea and vomiting.   Skin: Positive for wound.       Objective:      Vitals:    03/12/19 0939   BP: 111/66   BP Location: Left arm   Patient Position: Sitting   BP Method: Large (Automatic)   Pulse: 83   Resp: 18   Temp: 98.5 °F (36.9 °C)   TempSrc: Oral   SpO2: 95%   Weight: 118.4 kg (261 lb)   Height: 5' 10.5" (1.791 m)     Physical Exam   Constitutional: He is oriented to person, place, and time. He appears well-developed and well-nourished.   HENT:   Head: Normocephalic and atraumatic.   Cardiovascular: Normal rate, regular rhythm and normal heart sounds.   Pulmonary/Chest: Effort normal and breath sounds normal.   Genitourinary:         Musculoskeletal: He exhibits no edema.   Neurological: He is alert and oriented to person, place, and time.   Skin: Skin is warm and dry.   Nursing note and vitals reviewed.      Assessment:       1. Perineal abscess    2. Need for vaccination        Plan:       Perineal abscess  Site repacked with quarter-inch gauze.  Continue antibiotics.  Patient instructed to remove packing in 48 hr, then change dressing daily.  Return to clinic as needed.  Need for vaccination  -     Td Vaccine (Adult) - Preservative Free      Medication List with Changes/Refills   Current Medications    ADALIMUMAB (HUMIRA) 40 MG/0.8 ML SYKT INJECTION    Inject 40 mg into the skin every 14 " (fourteen) days.    ALBUTEROL (PROVENTIL) 2.5 MG /3 ML (0.083 %) NEBULIZER SOLUTION    Take 3 mLs (2.5 mg total) by nebulization every 6 (six) hours as needed for Wheezing.    ALBUTEROL-IPRATROPIUM 2.5MG-0.5MG/3ML (DUO-NEB) 0.5 MG-3 MG(2.5 MG BASE)/3 ML NEBULIZER SOLUTION    Take 3 mLs by nebulization every 6 (six) hours as needed for Wheezing or Shortness of Breath. Rescue    ALLOPURINOL (ZYLOPRIM) 100 MG TABLET    TAKE 1 TABLET EVERY DAY    ALLOPURINOL (ZYLOPRIM) 300 MG TABLET    TAKE 1 TABLET EVERY DAY    ASPIRIN (ASPIR-81) 81 MG EC TABLET    Take 81 mg by mouth once daily.    BUMETANIDE (BUMEX) 2 MG TABLET    TAKE 1 TABLET EVERY DAY    CALCIUM CARBONATE (OS-VÍCTOR) 600 MG (1,500 MG) TAB    Take 600 mg by mouth once daily.    CLINDAMYCIN (CLEOCIN) 150 MG CAPSULE    Take 2 capsules (300 mg total) by mouth 4 (four) times daily. for 7 days    DIAZEPAM (VALIUM) 10 MG TAB    1 tablet daily as needed for anxiety    FISH OIL-OMEGA-3 FATTY ACIDS 300-1,000 MG CAPSULE    Take 1,200 mg by mouth once daily.    HYDROCODONE-ACETAMINOPHEN (NORCO)  MG PER TABLET    Take 1 tablet by mouth every 8 (eight) hours as needed for Pain.    HYDROCODONE-ACETAMINOPHEN (NORCO) 5-325 MG PER TABLET    Take 1 tablet by mouth every 4 (four) hours as needed for Pain.    IBUPROFEN (ADVIL,MOTRIN) 800 MG TABLET    TAKE 1 TABLET THREE TIMES DAILY AS NEEDED    METFORMIN (GLUCOPHAGE-XR) 500 MG 24 HR TABLET    Take 1 tablet (500 mg total) by mouth daily with breakfast.    MONTELUKAST (SINGULAIR) 10 MG TABLET    Take 1 tablet (10 mg total) by mouth every evening.    ONDANSETRON (ZOFRAN) 4 MG TABLET    Take 1 tablet (4 mg total) by mouth every 6 (six) hours.    POTASSIUM CHLORIDE SA (K-DUR,KLOR-CON) 20 MEQ TABLET    TAKE 2 TABLETS TWICE DAILY    ROSUVASTATIN (CRESTOR) 5 MG TABLET    TAKE 1 TABLET BY MOUTH EVERY DAY    SILDENAFIL (VIAGRA) 100 MG TABLET    Take 1 tablet (100 mg total) by mouth daily as needed for Erectile Dysfunction.     TIOTROPIUM-OLODATEROL (STIOLTO RESPIMAT) 2.5-2.5 MCG/ACTUATION MIST    Inhale 1 Inhaler into the lungs once daily.    TRAZODONE (DESYREL) 100 MG TABLET    TAKE 1 TABLET AT BEDTIME    VENTOLIN HFA 90 MCG/ACTUATION INHALER    Inhale 1 puff into the lungs 4 (four) times daily as needed.   Discontinued Medications    METHYLPREDNISOLONE (MEDROL DOSEPACK) 4 MG TABLET    use as directed

## 2019-03-15 DIAGNOSIS — E87.6 HYPOKALEMIA: ICD-10-CM

## 2019-03-15 RX ORDER — ALLOPURINOL 300 MG/1
TABLET ORAL
Qty: 90 TABLET | Refills: 1 | Status: SHIPPED | OUTPATIENT
Start: 2019-03-15 | End: 2019-09-11 | Stop reason: SDUPTHER

## 2019-03-15 RX ORDER — POTASSIUM CHLORIDE 1500 MG/1
TABLET, EXTENDED RELEASE ORAL
Qty: 360 TABLET | Refills: 1 | Status: SHIPPED | OUTPATIENT
Start: 2019-03-15 | End: 2020-08-03

## 2019-03-15 RX ORDER — ALLOPURINOL 100 MG/1
TABLET ORAL
Qty: 90 TABLET | Refills: 1 | Status: SHIPPED | OUTPATIENT
Start: 2019-03-15 | End: 2019-10-01 | Stop reason: SDUPTHER

## 2019-03-18 RX ORDER — TRAZODONE HYDROCHLORIDE 100 MG/1
TABLET ORAL
Qty: 90 TABLET | Refills: 3 | Status: SHIPPED | OUTPATIENT
Start: 2019-03-18 | End: 2020-04-20

## 2019-04-10 DIAGNOSIS — R73.03 BORDERLINE DIABETES: ICD-10-CM

## 2019-04-10 RX ORDER — METFORMIN HYDROCHLORIDE 500 MG/1
TABLET, EXTENDED RELEASE ORAL
Qty: 90 TABLET | Refills: 1 | Status: SHIPPED | OUTPATIENT
Start: 2019-04-10 | End: 2019-08-12 | Stop reason: SDUPTHER

## 2019-04-12 DIAGNOSIS — J44.9 CHRONIC OBSTRUCTIVE PULMONARY DISEASE, UNSPECIFIED COPD TYPE: ICD-10-CM

## 2019-05-03 RX ORDER — ROSUVASTATIN CALCIUM 5 MG/1
TABLET, COATED ORAL
Qty: 90 TABLET | Refills: 3 | Status: SHIPPED | OUTPATIENT
Start: 2019-05-03 | End: 2019-08-12 | Stop reason: SDUPTHER

## 2019-05-10 DIAGNOSIS — J45.40 MODERATE PERSISTENT ASTHMA, UNCOMPLICATED: ICD-10-CM

## 2019-05-10 DIAGNOSIS — J44.9 COPD MIXED TYPE: ICD-10-CM

## 2019-05-13 RX ORDER — MONTELUKAST SODIUM 10 MG/1
10 TABLET ORAL NIGHTLY
Qty: 90 TABLET | Refills: 0 | Status: SHIPPED | OUTPATIENT
Start: 2019-05-13 | End: 2019-05-22 | Stop reason: SDUPTHER

## 2019-05-22 ENCOUNTER — OFFICE VISIT (OUTPATIENT)
Dept: PULMONOLOGY | Facility: CLINIC | Age: 54
End: 2019-05-22
Payer: MEDICARE

## 2019-05-22 VITALS
HEIGHT: 71 IN | SYSTOLIC BLOOD PRESSURE: 116 MMHG | OXYGEN SATURATION: 94 % | HEART RATE: 92 BPM | BODY MASS INDEX: 36.73 KG/M2 | WEIGHT: 262.38 LBS | DIASTOLIC BLOOD PRESSURE: 72 MMHG

## 2019-05-22 DIAGNOSIS — E66.2 CLASS 2 OBESITY WITH ALVEOLAR HYPOVENTILATION, SERIOUS COMORBIDITY, AND BODY MASS INDEX (BMI) OF 37.0 TO 37.9 IN ADULT: ICD-10-CM

## 2019-05-22 DIAGNOSIS — J44.9 CHRONIC OBSTRUCTIVE PULMONARY DISEASE, UNSPECIFIED COPD TYPE: ICD-10-CM

## 2019-05-22 DIAGNOSIS — J44.9 COPD MIXED TYPE: ICD-10-CM

## 2019-05-22 DIAGNOSIS — J98.6 DIAPHRAGM PARALYSIS: Primary | ICD-10-CM

## 2019-05-22 DIAGNOSIS — J45.40 MODERATE PERSISTENT ASTHMA, UNCOMPLICATED: ICD-10-CM

## 2019-05-22 DIAGNOSIS — R09.89 CHRONIC SINUS COMPLAINTS: ICD-10-CM

## 2019-05-22 DIAGNOSIS — J96.11 CHRONIC RESPIRATORY FAILURE WITH HYPOXIA: ICD-10-CM

## 2019-05-22 PROCEDURE — 3008F BODY MASS INDEX DOCD: CPT | Mod: CPTII,S$GLB,, | Performed by: NURSE PRACTITIONER

## 2019-05-22 PROCEDURE — 99999 PR PBB SHADOW E&M-EST. PATIENT-LVL III: ICD-10-PCS | Mod: PBBFAC,,, | Performed by: NURSE PRACTITIONER

## 2019-05-22 PROCEDURE — 99214 OFFICE O/P EST MOD 30 MIN: CPT | Mod: S$GLB,,, | Performed by: NURSE PRACTITIONER

## 2019-05-22 PROCEDURE — 3008F PR BODY MASS INDEX (BMI) DOCUMENTED: ICD-10-PCS | Mod: CPTII,S$GLB,, | Performed by: NURSE PRACTITIONER

## 2019-05-22 PROCEDURE — 99999 PR PBB SHADOW E&M-EST. PATIENT-LVL III: CPT | Mod: PBBFAC,,, | Performed by: NURSE PRACTITIONER

## 2019-05-22 PROCEDURE — 99214 PR OFFICE/OUTPT VISIT, EST, LEVL IV, 30-39 MIN: ICD-10-PCS | Mod: S$GLB,,, | Performed by: NURSE PRACTITIONER

## 2019-05-22 RX ORDER — MONTELUKAST SODIUM 10 MG/1
10 TABLET ORAL NIGHTLY
Qty: 90 TABLET | Refills: 3 | Status: SHIPPED | OUTPATIENT
Start: 2019-05-22 | End: 2019-08-21 | Stop reason: SDUPTHER

## 2019-05-22 RX ORDER — IPRATROPIUM BROMIDE AND ALBUTEROL SULFATE 2.5; .5 MG/3ML; MG/3ML
3 SOLUTION RESPIRATORY (INHALATION) EVERY 6 HOURS PRN
Qty: 120 VIAL | Refills: 5 | Status: SHIPPED | OUTPATIENT
Start: 2019-05-22 | End: 2020-01-22 | Stop reason: SDUPTHER

## 2019-05-22 RX ORDER — ALBUTEROL SULFATE 90 UG/1
1 AEROSOL, METERED RESPIRATORY (INHALATION) 4 TIMES DAILY PRN
Qty: 18 G | Refills: 11 | Status: SHIPPED | OUTPATIENT
Start: 2019-05-22 | End: 2020-01-22 | Stop reason: SDUPTHER

## 2019-05-22 RX ORDER — LORATADINE 10 MG/1
10 TABLET ORAL DAILY
COMMUNITY

## 2019-05-22 NOTE — PROGRESS NOTES
5/22/2019    Mina Zelaya  Office Note    Chief Complaint   Patient presents with    Follow-up     1 year    Shortness of Breath     HPI:  May 22, 2019- breathing difficult today, SOB worse with exertion, states recue inhaler does not help much. Worsened in past 24 hours, affected with weather change. Currently using NIV nightly. Nosebleeds resolved. NO prednisone use in past year. Cold in Nov 2018, tx antibiotics. On supplemental oxygen wearing 2-4 hours daily and all night. Currently using stiolto daily.     April 16, 2018- lives alone, sees grandson daily post school, having freq nose bleed, uses full face mask for niv.  Frustrated, marked starr.    jan 11, 2017- inactivity ppt weight gain, mood bad at times, daughter and grandson moved out and  Pt feeling down a bit.     Uses o2 and non invasive ventilator -- extreme air hunger with activity and supine. aspriates 2/day to 2 /week.  Uses bronchial rx.      Uses niv and o2 every night for sleep and for naps 1-2 daily.  Feels like worsening.    Sept 9, 2016HPI: worse off advair and spiriva, irratents worsen, niv used 12/24 daily, cannot bend over and uc doing poorly due to abd pain.        The chief compliant  problem varies with instablilty at time    PFSH:  Past Medical History:   Diagnosis Date    Arthritis     Asthma     Chronic bronchitis     Emphysema of lung     Type 2 diabetes mellitus with hyperlipidemia 1/25/2018         History reviewed. No pertinent surgical history.  Social History     Tobacco Use    Smoking status: Former Smoker     Packs/day: 1.00     Years: 15.00     Pack years: 15.00     Types: Cigarettes     Last attempt to quit: 6/6/2003     Years since quitting: 15.9    Smokeless tobacco: Never Used   Substance Use Topics    Alcohol use: Yes     Alcohol/week: 15.0 oz     Types: 25 Cans of beer per week    Drug use: No     Family History   Problem Relation Age of Onset    Stroke Mother     No Known Problems Father     Pneumonia  "Sister     Stroke Brother     No Known Problems Maternal Aunt     Stroke Paternal Aunt     Cancer Paternal Uncle     Cancer Maternal Grandmother     Heart failure Maternal Grandfather     Stroke Paternal Grandmother     No Known Problems Paternal Grandfather     Stroke Brother     No Known Problems Brother     No Known Problems Brother      Review of patient's allergies indicates:   Allergen Reactions    Sulfa (sulfonamide antibiotics) Rash       Performance Status:The patient's activity level is housebound activities.      Review of Systems:  a review of eleven systems covering constitutional, Eye, HEENT, Psych, Respiratory, Cardiac, GI, , Musculoskeletal, Endocrine, Dermatologic was negative except for pertinent findings as listed ABOVE and below: all good except r elbow arthritis and neck pain SOB with exertion.    Exam:Comprehensive exam done.   /72 (BP Location: Left arm, Patient Position: Sitting)   Pulse 92   Ht 5' 10.5" (1.791 m)   Wt 119 kg (262 lb 5.6 oz)   SpO2 (!) 94% Comment: on room air  BMI 37.11 kg/m²   Exam included Vitals as listed, and patient's appearance and affect and alertness and mood, oral exam for yeast and hygiene and pharynx lesions and Mallapatti (M) score, neck with inspection for jvd and masses and thyroid abnormalities and lymph nodes (supraclavicular and infraclavicular nodes and axillary also examined and noted if abn), chest exam included symmetry and effort and fremitus and percussion and auscultation, cardiac exam included rhythm and gallops and murmur and rubs and jvd and edema, abdominal exam for mass and hepatosplenomegaly and tenderness and hernias and bowel sounds, Musculoskeletal exam with muscle tone and posture and mobility/gait and  strength, and skin for rashes and cyanosis and pallor and turgor, extremity for clubbing.  Findings were normal except for pertinent findings listed below: affect flat  M3 and no diaphragm motion, good bs but " shallow.    Radiographs reviewed: was not done   XR CHEST PA AND LATERAL 11/07/2018   Redemonstration of mild bilateral basal lung stranding a chronic finding apparently.  No new pulmonary changes.    The cardiac silhouette is normal in size. The hilar and mediastinal contours are unremarkable.      Labs reviewed  Results for HEAVEN HARDIN (MRN [id number]) as of 5/22/2019 09:29   Ref. Range 2/11/2019 07:14   Sodium Latest Ref Range: 136 - 145 mmol/L 139   Potassium Latest Ref Range: 3.5 - 5.1 mmol/L 3.6   Chloride Latest Ref Range: 101 - 111 mmol/L 99 (L)   CO2 Latest Ref Range: 23 - 29 mmol/L 27   Anion Gap Latest Ref Range: 8 - 16 mmol/L 13   BUN, Bld Latest Ref Range: 6 - 20 mg/dL 17   Creatinine Latest Ref Range: 0.5 - 1.4 mg/dL 0.8   eGFR if non African American Latest Ref Range: >60 mL/min/1.73 m^2 >60.0   eGFR if African American Latest Ref Range: >60 mL/min/1.73 m^2 >60.0   Glucose Latest Ref Range: 74 - 118 mg/dL 123 (H)   Calcium Latest Ref Range: 8.6 - 10.0 mg/dL 8.8       PFT was not done    Plan:  Clinical impression is resonably certain and repeated evaluation prn +/- follow up will be needed as below.    Heaven was seen today for follow-up and shortness of breath.    Diagnoses and all orders for this visit:    Diaphragm paralysis   - Continue NIV therapy  COPD mixed type  -     montelukast (SINGULAIR) 10 mg tablet; Take 1 tablet (10 mg total) by mouth every evening.  -     albuterol-ipratropium (DUO-NEB) 2.5 mg-0.5 mg/3 mL nebulizer solution; Take 3 mLs by nebulization every 6 (six) hours as needed for Wheezing or Shortness of Breath. Rescue  -     VENTOLIN HFA 90 mcg/actuation inhaler; Inhale 1 puff into the lungs 4 (four) times daily as needed.    Moderate persistent asthma, uncomplicated  -     montelukast (SINGULAIR) 10 mg tablet; Take 1 tablet (10 mg total) by mouth every evening.  -     albuterol-ipratropium (DUO-NEB) 2.5 mg-0.5 mg/3 mL nebulizer solution; Take 3 mLs by nebulization every 6 (six)  hours as needed for Wheezing or Shortness of Breath. Rescue  -     VENTOLIN HFA 90 mcg/actuation inhaler; Inhale 1 puff into the lungs 4 (four) times daily as needed.    Chronic respiratory failure with hypoxia   - continue NIV therapy  Chronic sinus complaints  -     montelukast (SINGULAIR) 10 mg tablet; Take 1 tablet (10 mg total) by mouth every evening.    Chronic obstructive pulmonary disease, unspecified COPD type  -     albuterol-ipratropium (DUO-NEB) 2.5 mg-0.5 mg/3 mL nebulizer solution; Take 3 mLs by nebulization every 6 (six) hours as needed for Wheezing or Shortness of Breath. Rescue    Class 2 obesity with alveolar hypoventilation, serious comorbidity, and body mass index (BMI) of 37.0 to 37.9 in adult   - discussed [facility] weight loss services      Follow up in about 1 year (around 5/22/2020), or if symptoms worsen or fail to improve.    Discussed with patient above for education the following:    Continuation of current therapy  Continue NIV therapy for diaphragm paralysis induced respiratory failure.  Continue Stiolto daily    Cause of paralysis not clear, not likely reversable.    Plication of diaphragm is invasive surgical, may not help much, useful for one side paralysis.  Could have eval at main campus?  Follow here later?    Discussed weight loss services at [facility], if interested contact clinic for referral  Abdominal weight does effect ability to breath when bending over.    Will try new medication for 2 weeks, Trelegy 1 puff daily, if not benefit return to Stiolto daily, if you feel a benefit contact clinic and will order

## 2019-05-22 NOTE — PATIENT INSTRUCTIONS
Continuation of current therapy  Continue NIV therapy for diaphragm paralysis induced respiratory failure.  Continue Stiolto daily    Cause of paralysis not clear, not likely reversable.    Plication of diaphragm is invasive surgical, may not help much, useful for one side paralysis.  Could have eval at [facility]?  Follow here later?    Discussed weight loss services at [facility], if interested contact clinic for referral  Abdominal weight does effect ability to breath when bending over.    Will try new medication for 2 weeks, Trelegy 1 puff daily, if not benefit return to Stiolto daily, if you feel a benefit contact clinic and will order

## 2019-06-09 DIAGNOSIS — F41.9 ANXIETY: ICD-10-CM

## 2019-06-15 RX ORDER — DIAZEPAM 10 MG/1
TABLET ORAL
Qty: 30 TABLET | Refills: 1 | Status: SHIPPED | OUTPATIENT
Start: 2019-06-15 | End: 2023-02-14

## 2019-06-21 RX ORDER — BUMETANIDE 2 MG/1
TABLET ORAL
Qty: 90 TABLET | Refills: 1 | Status: SHIPPED | OUTPATIENT
Start: 2019-06-21 | End: 2019-11-11 | Stop reason: SDUPTHER

## 2019-06-25 ENCOUNTER — OFFICE VISIT (OUTPATIENT)
Dept: PRIMARY CARE CLINIC | Facility: CLINIC | Age: 54
End: 2019-06-25
Payer: MEDICARE

## 2019-06-25 VITALS
DIASTOLIC BLOOD PRESSURE: 67 MMHG | HEIGHT: 71 IN | RESPIRATION RATE: 18 BRPM | HEART RATE: 92 BPM | WEIGHT: 259 LBS | TEMPERATURE: 98 F | BODY MASS INDEX: 36.26 KG/M2 | OXYGEN SATURATION: 95 % | SYSTOLIC BLOOD PRESSURE: 120 MMHG

## 2019-06-25 DIAGNOSIS — M51.36 DDD (DEGENERATIVE DISC DISEASE), LUMBAR: ICD-10-CM

## 2019-06-25 PROCEDURE — 99213 PR OFFICE/OUTPT VISIT, EST, LEVL III, 20-29 MIN: ICD-10-PCS | Mod: S$GLB,,, | Performed by: FAMILY MEDICINE

## 2019-06-25 PROCEDURE — 99999 PR PBB SHADOW E&M-EST. PATIENT-LVL III: CPT | Mod: PBBFAC,,, | Performed by: FAMILY MEDICINE

## 2019-06-25 PROCEDURE — 99999 PR PBB SHADOW E&M-EST. PATIENT-LVL III: ICD-10-PCS | Mod: PBBFAC,,, | Performed by: FAMILY MEDICINE

## 2019-06-25 PROCEDURE — 3008F BODY MASS INDEX DOCD: CPT | Mod: CPTII,S$GLB,, | Performed by: FAMILY MEDICINE

## 2019-06-25 PROCEDURE — 3008F PR BODY MASS INDEX (BMI) DOCUMENTED: ICD-10-PCS | Mod: CPTII,S$GLB,, | Performed by: FAMILY MEDICINE

## 2019-06-25 PROCEDURE — 99213 OFFICE O/P EST LOW 20 MIN: CPT | Mod: S$GLB,,, | Performed by: FAMILY MEDICINE

## 2019-06-25 RX ORDER — HYDROCODONE BITARTRATE AND ACETAMINOPHEN 10; 325 MG/1; MG/1
1 TABLET ORAL EVERY 8 HOURS PRN
Qty: 90 TABLET | Refills: 0 | Status: SHIPPED | OUTPATIENT
Start: 2019-06-25 | End: 2020-07-29 | Stop reason: SDUPTHER

## 2019-06-25 NOTE — PROGRESS NOTES
"Subjective:       Patient ID: Mina Zelaya is a 53 y.o. male.    Chief Complaint: No chief complaint on file.    Chronic, intermittent abdominal and lower back pain, takes pain meds intermittently, requesting refill.  Several months ago.  Colitis has been flaring up lately, having some mucousy stools and increasing abdominal cramping.  No fevers or chills.    Review of Systems   Constitutional: Negative for fever.   Respiratory: Negative for shortness of breath.    Cardiovascular: Negative for chest pain.   Gastrointestinal: Positive for abdominal pain and diarrhea. Negative for blood in stool.   Musculoskeletal: Positive for back pain.   Psychiatric/Behavioral: Negative for agitation and confusion.       Objective:      Vitals:    06/25/19 0941   BP: 120/67   BP Location: Left arm   Patient Position: Sitting   BP Method: Large (Automatic)   Pulse: 92   Resp: 18   Temp: 98.4 °F (36.9 °C)   TempSrc: Oral   SpO2: 95%   Weight: 117.5 kg (259 lb)   Height: 5' 10.5" (1.791 m)     Physical Exam   Constitutional: He is oriented to person, place, and time. He appears well-developed and well-nourished.   HENT:   Head: Normocephalic and atraumatic.   Cardiovascular: Normal rate, regular rhythm and normal heart sounds.   Pulmonary/Chest: Effort normal and breath sounds normal.   Musculoskeletal: He exhibits no edema.        Lumbar back: He exhibits decreased range of motion and tenderness. He exhibits no deformity.   Neurological: He is alert and oriented to person, place, and time.   Skin: Skin is warm and dry.   Nursing note and vitals reviewed.      Assessment:       1. DDD (degenerative disc disease), lumbar        Plan:       DDD (degenerative disc disease), lumbar  Comments:  stable on current Rx  Orders:  -     HYDROcodone-acetaminophen (NORCO)  mg per tablet; Take 1 tablet by mouth every 8 (eight) hours as needed for Pain.  Dispense: 90 tablet; Refill: 0      Medication List with Changes/Refills   Current " Medications    ADALIMUMAB (HUMIRA) 40 MG/0.8 ML SYKT INJECTION    Inject 40 mg into the skin every 14 (fourteen) days.    ALBUTEROL-IPRATROPIUM (DUO-NEB) 2.5 MG-0.5 MG/3 ML NEBULIZER SOLUTION    Take 3 mLs by nebulization every 6 (six) hours as needed for Wheezing or Shortness of Breath. Rescue    ALLOPURINOL (ZYLOPRIM) 100 MG TABLET    TAKE 1 TABLET EVERY DAY    ALLOPURINOL (ZYLOPRIM) 300 MG TABLET    TAKE 1 TABLET EVERY DAY    ASPIRIN (ASPIR-81) 81 MG EC TABLET    Take 81 mg by mouth once daily.    BUMETANIDE (BUMEX) 2 MG TABLET    TAKE 1 TABLET EVERY DAY    CALCIUM CARBONATE (OS-VÍCTOR) 600 MG (1,500 MG) TAB    Take 600 mg by mouth once daily.    DIAZEPAM (VALIUM) 10 MG TAB    TAKE 1 TABLET BY MOUTH DAILY AS NEEDED FOR ANXIETY    FISH OIL-OMEGA-3 FATTY ACIDS 300-1,000 MG CAPSULE    Take 1,200 mg by mouth once daily.    IBUPROFEN (ADVIL,MOTRIN) 800 MG TABLET    TAKE 1 TABLET THREE TIMES DAILY AS NEEDED    KLOR-CON M20 20 MEQ TABLET    TAKE 2 TABLETS TWICE DAILY    LORATADINE (CLARITIN) 10 MG TABLET    Take 10 mg by mouth once daily.    METFORMIN (GLUCOPHAGE-XR) 500 MG 24 HR TABLET    TAKE 1 TABLET(500 MG) BY MOUTH DAILY WITH BREAKFAST    MONTELUKAST (SINGULAIR) 10 MG TABLET    Take 1 tablet (10 mg total) by mouth every evening.    ONDANSETRON (ZOFRAN) 4 MG TABLET    Take 1 tablet (4 mg total) by mouth every 6 (six) hours.    ROSUVASTATIN (CRESTOR) 5 MG TABLET    TAKE 1 TABLET BY MOUTH EVERY DAY    SILDENAFIL (VIAGRA) 100 MG TABLET    Take 1 tablet (100 mg total) by mouth daily as needed for Erectile Dysfunction.    TIOTROPIUM-OLODATEROL (STIOLTO RESPIMAT) 2.5-2.5 MCG/ACTUATION MIST    Inhale 1 Inhaler into the lungs once daily.    TRAZODONE (DESYREL) 100 MG TABLET    TAKE 1 TABLET AT BEDTIME    VENTOLIN HFA 90 MCG/ACTUATION INHALER    Inhale 1 puff into the lungs 4 (four) times daily as needed.   Changed and/or Refilled Medications    Modified Medication Previous Medication    HYDROCODONE-ACETAMINOPHEN (NORCO)   MG PER TABLET HYDROcodone-acetaminophen (NORCO)  mg per tablet       Take 1 tablet by mouth every 8 (eight) hours as needed for Pain.    Take 1 tablet by mouth every 8 (eight) hours as needed for Pain.   Discontinued Medications    HYDROCODONE-ACETAMINOPHEN (NORCO) 5-325 MG PER TABLET    Take 1 tablet by mouth every 4 (four) hours as needed for Pain.

## 2019-08-12 DIAGNOSIS — R73.03 BORDERLINE DIABETES: ICD-10-CM

## 2019-08-12 DIAGNOSIS — M51.36 DDD (DEGENERATIVE DISC DISEASE), LUMBAR: Primary | ICD-10-CM

## 2019-08-12 RX ORDER — METFORMIN HYDROCHLORIDE 500 MG/1
TABLET, EXTENDED RELEASE ORAL
Qty: 90 TABLET | Refills: 1 | Status: SHIPPED | OUTPATIENT
Start: 2019-08-12 | End: 2019-10-16 | Stop reason: SDUPTHER

## 2019-08-12 RX ORDER — IBUPROFEN 800 MG/1
800 TABLET ORAL 3 TIMES DAILY
Qty: 270 TABLET | Refills: 1 | Status: SHIPPED | OUTPATIENT
Start: 2019-08-12 | End: 2020-04-22

## 2019-08-12 RX ORDER — ROSUVASTATIN CALCIUM 5 MG/1
5 TABLET, COATED ORAL DAILY
Qty: 90 TABLET | Refills: 3 | Status: SHIPPED | OUTPATIENT
Start: 2019-08-12 | End: 2020-06-25

## 2019-08-12 NOTE — TELEPHONE ENCOUNTER
----- Message from Milvia Thomas sent at 8/12/2019  2:45 PM CDT -----  Contact: Mulu with [facility] phone [phone]  Type:  RX Refill Request    Who Called:  Mulu with [facility]  Refill or New Rx:  REfill  RX Name and Strength:  metFORMIN (GLUCOPHAGE-XR) 500 MG 24 hr tablet  How is the patient currently taking it? (ex. 1XDay):  TAKE 1 TABLET(500 MG) BY MOUTH DAILY WITH BREAKFAST  Is this a 30 day or 90 day RX:  90  Preferred Pharmacy with phone number:    [facility] - [place], [place] - [address]  [address] [address]  Phone: [phone] Fax: [phone]  Local or Mail Order:  Mail order  Ordering Provider:  Dr Teo Simpson Call Back Number:  [phone]  Additional Information:  Please advise. Thanks.

## 2019-08-13 ENCOUNTER — TELEPHONE (OUTPATIENT)
Dept: PULMONOLOGY | Facility: CLINIC | Age: 54
End: 2019-08-13

## 2019-08-13 NOTE — TELEPHONE ENCOUNTER
Dr. Leiva cleared the patient for his surgery.      ----- Message from Jesusita Parada RT sent at 8/13/2019 10:20 AM CDT -----  Contact: pt    pt , please fax the letter of clearance to Dr. RAJINDER Elias at , thanks.

## 2019-08-14 ENCOUNTER — TELEPHONE (OUTPATIENT)
Dept: PULMONOLOGY | Facility: CLINIC | Age: 54
End: 2019-08-14

## 2019-08-14 NOTE — TELEPHONE ENCOUNTER
Offered patient and appt to come in patient stated he can not make it in due to being sick.      ----- Message from Ruben Leiva MD sent at 8/13/2019  4:18 PM CDT -----  Offer f/u to discuss colonoscope procedure risk.

## 2019-08-21 DIAGNOSIS — J44.9 COPD MIXED TYPE: ICD-10-CM

## 2019-08-21 DIAGNOSIS — J45.40 MODERATE PERSISTENT ASTHMA, UNCOMPLICATED: ICD-10-CM

## 2019-08-21 DIAGNOSIS — R09.89 CHRONIC SINUS COMPLAINTS: ICD-10-CM

## 2019-08-21 RX ORDER — MONTELUKAST SODIUM 10 MG/1
10 TABLET ORAL NIGHTLY
Qty: 90 TABLET | Refills: 3 | Status: SHIPPED | OUTPATIENT
Start: 2019-08-21 | End: 2020-01-22 | Stop reason: SDUPTHER

## 2019-08-27 LAB — CRC RECOMMENDATION EXT: NORMAL

## 2019-09-12 RX ORDER — ALLOPURINOL 300 MG/1
TABLET ORAL
Qty: 90 TABLET | Refills: 1 | Status: SHIPPED | OUTPATIENT
Start: 2019-09-12 | End: 2020-04-02

## 2019-10-02 RX ORDER — ALLOPURINOL 100 MG/1
TABLET ORAL
Qty: 90 TABLET | Refills: 1 | Status: SHIPPED | OUTPATIENT
Start: 2019-10-02 | End: 2020-02-24

## 2019-10-02 RX ORDER — POTASSIUM CHLORIDE 20 MEQ/1
TABLET, EXTENDED RELEASE ORAL
Qty: 360 TABLET | Refills: 1 | Status: SHIPPED | OUTPATIENT
Start: 2019-10-02 | End: 2020-02-24

## 2019-10-16 DIAGNOSIS — R73.03 BORDERLINE DIABETES: ICD-10-CM

## 2019-10-16 RX ORDER — METFORMIN HYDROCHLORIDE 500 MG/1
TABLET, EXTENDED RELEASE ORAL
Qty: 90 TABLET | Refills: 1 | Status: SHIPPED | OUTPATIENT
Start: 2019-10-16 | End: 2020-05-21

## 2019-11-12 RX ORDER — BUMETANIDE 2 MG/1
TABLET ORAL
Qty: 90 TABLET | Refills: 1 | Status: SHIPPED | OUTPATIENT
Start: 2019-11-12 | End: 2020-03-31

## 2020-01-20 ENCOUNTER — TELEPHONE (OUTPATIENT)
Dept: PULMONOLOGY | Facility: CLINIC | Age: 55
End: 2020-01-20

## 2020-01-20 NOTE — TELEPHONE ENCOUNTER
Spoke to pt . Offered soonest available apt for paper to be signed, pt agreed. no further action needed  ----- Message from Teresa Ortega sent at 1/20/2020  8:30 AM CST -----  Contact: pt [phone]  Patient called he has some paperwork that he is asking if you will fill out to help him get some assistance to pay for his Inhalers. It is costing him $95.00 a month for his inhalers. Can he come  By and drop off the paperwork or does he have to have an appt.  Please call back.

## 2020-01-22 ENCOUNTER — OFFICE VISIT (OUTPATIENT)
Dept: PULMONOLOGY | Facility: CLINIC | Age: 55
End: 2020-01-22
Payer: MEDICARE

## 2020-01-22 VITALS
WEIGHT: 261.44 LBS | HEART RATE: 82 BPM | BODY MASS INDEX: 36.99 KG/M2 | SYSTOLIC BLOOD PRESSURE: 136 MMHG | OXYGEN SATURATION: 95 % | DIASTOLIC BLOOD PRESSURE: 68 MMHG

## 2020-01-22 DIAGNOSIS — R09.89 CHRONIC SINUS COMPLAINTS: ICD-10-CM

## 2020-01-22 DIAGNOSIS — J45.40 MODERATE PERSISTENT ASTHMA, UNCOMPLICATED: ICD-10-CM

## 2020-01-22 DIAGNOSIS — J44.9 CHRONIC OBSTRUCTIVE PULMONARY DISEASE, UNSPECIFIED COPD TYPE: ICD-10-CM

## 2020-01-22 DIAGNOSIS — J96.12 CHRONIC RESPIRATORY FAILURE WITH HYPERCAPNIA: ICD-10-CM

## 2020-01-22 DIAGNOSIS — J98.6 DIAPHRAGM PARALYSIS: ICD-10-CM

## 2020-01-22 DIAGNOSIS — J44.9 COPD MIXED TYPE: ICD-10-CM

## 2020-01-22 DIAGNOSIS — J44.9 COPD MIXED TYPE: Primary | ICD-10-CM

## 2020-01-22 PROCEDURE — 99213 OFFICE O/P EST LOW 20 MIN: CPT | Mod: S$GLB,,, | Performed by: INTERNAL MEDICINE

## 2020-01-22 PROCEDURE — 99213 PR OFFICE/OUTPT VISIT, EST, LEVL III, 20-29 MIN: ICD-10-PCS | Mod: S$GLB,,, | Performed by: INTERNAL MEDICINE

## 2020-01-22 PROCEDURE — 3008F PR BODY MASS INDEX (BMI) DOCUMENTED: ICD-10-PCS | Mod: CPTII,S$GLB,, | Performed by: INTERNAL MEDICINE

## 2020-01-22 PROCEDURE — 99999 PR PBB SHADOW E&M-EST. PATIENT-LVL IV: CPT | Mod: PBBFAC,,, | Performed by: INTERNAL MEDICINE

## 2020-01-22 PROCEDURE — 3008F BODY MASS INDEX DOCD: CPT | Mod: CPTII,S$GLB,, | Performed by: INTERNAL MEDICINE

## 2020-01-22 PROCEDURE — 99999 PR PBB SHADOW E&M-EST. PATIENT-LVL IV: ICD-10-PCS | Mod: PBBFAC,,, | Performed by: INTERNAL MEDICINE

## 2020-01-22 RX ORDER — IPRATROPIUM BROMIDE AND ALBUTEROL SULFATE 2.5; .5 MG/3ML; MG/3ML
3 SOLUTION RESPIRATORY (INHALATION) EVERY 6 HOURS PRN
Qty: 120 VIAL | Refills: 5 | Status: SHIPPED | OUTPATIENT
Start: 2020-01-22 | End: 2021-04-21

## 2020-01-22 RX ORDER — MONTELUKAST SODIUM 10 MG/1
10 TABLET ORAL NIGHTLY
Qty: 90 TABLET | Refills: 3 | Status: SHIPPED | OUTPATIENT
Start: 2020-01-22 | End: 2020-06-25 | Stop reason: SDUPTHER

## 2020-01-22 RX ORDER — IBUPROFEN 100 MG/5ML
1000 SUSPENSION, ORAL (FINAL DOSE FORM) ORAL DAILY
COMMUNITY

## 2020-01-22 RX ORDER — MONTELUKAST SODIUM 10 MG/1
10 TABLET ORAL NIGHTLY
Qty: 90 TABLET | Refills: 3 | Status: CANCELLED | OUTPATIENT
Start: 2020-01-22

## 2020-01-22 RX ORDER — ALBUTEROL SULFATE 90 UG/1
1 AEROSOL, METERED RESPIRATORY (INHALATION) 4 TIMES DAILY PRN
Qty: 18 G | Refills: 11 | Status: SHIPPED | OUTPATIENT
Start: 2020-01-22 | End: 2023-09-07

## 2020-01-22 NOTE — PATIENT INSTRUCTIONS
No great changes, need prompt evaluation for lung problems.    May need antibiotic.    You need stiolto

## 2020-01-22 NOTE — PROGRESS NOTES
1/22/2020    Mina Zelaya  Office Note    No chief complaint on file.    HPI:  1/22/2020- breathing so bad cannot do activity.  No channge, needs rescue meds to help but not relieve sob, uses niv nightly - use roughly 8-10 hrs daily.  Uses stiolto with good results, no abx used.          May 22, 2019- breathing difficult today, SOB worse with exertion, states recue inhaler does not help much. Worsened in past 24 hours, affected with weather change. Currently using NIV nightly. Nosebleeds resolved. NO prednisone use in past year. Cold in Nov 2018, tx antibiotics. On supplemental oxygen wearing 2-4 hours daily and all night. Currently using stiolto daily.   Discussed with patient above for education the following:    Continuation of current therapy  Continue NIV therapy for diaphragm paralysis induced respiratory failure.  Continue Stiolto daily    Cause of paralysis not clear, not likely reversable.    Plication of diaphragm is invasive surgical, may not help much, useful for one side paralysis.  Could have eval at main campus?  Follow here later?    Discussed weight loss services at [facility], if interested contact clinic for referral  Abdominal weight does effect ability to breath when bending over.    Will try new medication for 2 weeks, Trelegy 1 puff daily, if not benefit return to Stiolto daily, if you feel a benefit contact clinic and will order  April 16, 2018- lives alone, sees grandemma daily post school, having freq nose bleed, uses full face mask for niv.  Frustrated, marked starr.    jan 11, 2017- inactivity ppt weight gain, mood bad at times, daughter and grandson moved out and  Pt feeling down a bit.     Uses o2 and non invasive ventilator -- extreme air hunger with activity and supine. aspriates 2/day to 2 /week.  Uses bronchial rx.      Uses niv and o2 every night for sleep and for naps 1-2 daily.  Feels like worsening.    Sept 9, 2016HPI: worse off advair and spiriva, irratents worsen, niv used   daily, cannot bend over and uc doing poorly due to abd pain.        The chief compliant  problem varies with instablilty at time    PFSH:  Past Medical History:   Diagnosis Date    Arthritis     Asthma     Chronic bronchitis     Emphysema of lung     Type 2 diabetes mellitus with hyperlipidemia 2018         No past surgical history on file.  Social History     Tobacco Use    Smoking status: Former Smoker     Packs/day: 1.00     Years: 15.00     Pack years: 15.00     Types: Cigarettes     Last attempt to quit: 2003     Years since quittin.6    Smokeless tobacco: Never Used   Substance Use Topics    Alcohol use: Yes     Alcohol/week: 25.0 standard drinks     Types: 25 Cans of beer per week    Drug use: No     Family History   Problem Relation Age of Onset    Stroke Mother     No Known Problems Father     Pneumonia Sister     Stroke Brother     No Known Problems Maternal Aunt     Stroke Paternal Aunt     Cancer Paternal Uncle     Cancer Maternal Grandmother     Heart failure Maternal Grandfather     Stroke Paternal Grandmother     No Known Problems Paternal Grandfather     Stroke Brother     No Known Problems Brother     No Known Problems Brother      Review of patient's allergies indicates:   Allergen Reactions    Sulfa (sulfonamide antibiotics) Rash       Performance Status:The patient's activity level is housebound activities.      Review of Systems:  a review of eleven systems covering constitutional, Eye, HEENT, Psych, Respiratory, Cardiac, GI, , Musculoskeletal, Endocrine, Dermatologic was negative except for pertinent findings as listed ABOVE and below: all good except r elbow arthritis and neck pain SOB with exertion.    Exam:Comprehensive exam done.   /68   Pulse 82   Wt 118.6 kg (261 lb 7.5 oz)   SpO2 95%   BMI 36.99 kg/m²   Exam included Vitals as listed, and patient's appearance and affect and alertness and mood, oral exam for yeast and hygiene and  pharynx lesions and Mallapatti (M) score, neck with inspection for jvd and masses and thyroid abnormalities and lymph nodes (supraclavicular and infraclavicular nodes and axillary also examined and noted if abn), chest exam included symmetry and effort and fremitus and percussion and auscultation, cardiac exam included rhythm and gallops and murmur and rubs and jvd and edema, abdominal exam for mass and hepatosplenomegaly and tenderness and hernias and bowel sounds, Musculoskeletal exam with muscle tone and posture and mobility/gait and  strength, and skin for rashes and cyanosis and pallor and turgor, extremity for clubbing.  Findings were normal except for pertinent findings listed below: affect flat  M3 and no diaphragm motion, good bs but shallow.    Radiographs reviewed: was not done   XR CHEST PA AND LATERAL 11/07/2018   Redemonstration of mild bilateral basal lung stranding a chronic finding apparently.  No new pulmonary changes.    The cardiac silhouette is normal in size. The hilar and mediastinal contours are unremarkable.      Labs reviewed  Results for HEAVEN HARDIN (MRN [id number]) as of 5/22/2019 09:29   Ref. Range 2/11/2019 07:14   Sodium Latest Ref Range: 136 - 145 mmol/L 139   Potassium Latest Ref Range: 3.5 - 5.1 mmol/L 3.6   Chloride Latest Ref Range: 101 - 111 mmol/L 99 (L)   CO2 Latest Ref Range: 23 - 29 mmol/L 27   Anion Gap Latest Ref Range: 8 - 16 mmol/L 13   BUN, Bld Latest Ref Range: 6 - 20 mg/dL 17   Creatinine Latest Ref Range: 0.5 - 1.4 mg/dL 0.8   eGFR if non African American Latest Ref Range: >60 mL/min/1.73 m^2 >60.0   eGFR if African American Latest Ref Range: >60 mL/min/1.73 m^2 >60.0   Glucose Latest Ref Range: 74 - 118 mg/dL 123 (H)   Calcium Latest Ref Range: 8.6 - 10.0 mg/dL 8.8       PFT was not done    Plan:  Clinical impression is resonably certain and repeated evaluation prn +/- follow up will be needed as below.    Heaven was seen today for follow-up and shortness of  breath.    Diagnoses and all orders for this visit:    Diaphragm paralysis   - Continue NIV therapy  COPD mixed type  -     montelukast (SINGULAIR) 10 mg tablet; Take 1 tablet (10 mg total) by mouth every evening.  -     albuterol-ipratropium (DUO-NEB) 2.5 mg-0.5 mg/3 mL nebulizer solution; Take 3 mLs by nebulization every 6 (six) hours as needed for Wheezing or Shortness of Breath. Rescue  -     VENTOLIN HFA 90 mcg/actuation inhaler; Inhale 1 puff into the lungs 4 (four) times daily as needed.    Moderate persistent asthma, uncomplicated  -     montelukast (SINGULAIR) 10 mg tablet; Take 1 tablet (10 mg total) by mouth every evening.  -     albuterol-ipratropium (DUO-NEB) 2.5 mg-0.5 mg/3 mL nebulizer solution; Take 3 mLs by nebulization every 6 (six) hours as needed for Wheezing or Shortness of Breath. Rescue  -     VENTOLIN HFA 90 mcg/actuation inhaler; Inhale 1 puff into the lungs 4 (four) times daily as needed.    Chronic respiratory failure with hypoxia   - continue NIV therapy  Chronic sinus complaints  -     montelukast (SINGULAIR) 10 mg tablet; Take 1 tablet (10 mg total) by mouth every evening.    Chronic obstructive pulmonary disease, unspecified COPD type  -     albuterol-ipratropium (DUO-NEB) 2.5 mg-0.5 mg/3 mL nebulizer solution; Take 3 mLs by nebulization every 6 (six) hours as needed for Wheezing or Shortness of Breath. Rescue    Class 2 obesity with alveolar hypoventilation, serious comorbidity, and body mass index (BMI) of 37.0 to 37.9 in adult   - discussed [facility] weight loss services      Follow up in about 1 year (around 1/22/2021), or if symptoms worsen or fail to improve.    Discussed with patient above for education the following:         Diagnoses and all orders for this visit:    COPD mixed type  -     VENTOLIN HFA 90 mcg/actuation inhaler; Inhale 1 puff into the lungs 4 (four) times daily as needed.  -     Discontinue: tiotropium-olodaterol (STIOLTO RESPIMAT) 2.5-2.5 mcg/actuation Mist;  Inhale 1 Inhaler into the lungs once daily.  -     montelukast (SINGULAIR) 10 mg tablet; Take 1 tablet (10 mg total) by mouth every evening.  -     albuterol-ipratropium (DUO-NEB) 2.5 mg-0.5 mg/3 mL nebulizer solution; Take 3 mLs by nebulization every 6 (six) hours as needed for Wheezing or Shortness of Breath. Rescue  -     tiotropium-olodaterol (STIOLTO RESPIMAT) 2.5-2.5 mcg/actuation Mist; Inhale 1 Inhaler into the lungs once daily.    Chronic respiratory failure with hypercapnia    Diaphragm paralysis    Moderate persistent asthma, uncomplicated  -     VENTOLIN HFA 90 mcg/actuation inhaler; Inhale 1 puff into the lungs 4 (four) times daily as needed.  -     montelukast (SINGULAIR) 10 mg tablet; Take 1 tablet (10 mg total) by mouth every evening.  -     albuterol-ipratropium (DUO-NEB) 2.5 mg-0.5 mg/3 mL nebulizer solution; Take 3 mLs by nebulization every 6 (six) hours as needed for Wheezing or Shortness of Breath. Rescue    Chronic obstructive pulmonary disease, unspecified COPD type  -     Discontinue: tiotropium-olodaterol (STIOLTO RESPIMAT) 2.5-2.5 mcg/actuation Mist; Inhale 1 Inhaler into the lungs once daily.  -     albuterol-ipratropium (DUO-NEB) 2.5 mg-0.5 mg/3 mL nebulizer solution; Take 3 mLs by nebulization every 6 (six) hours as needed for Wheezing or Shortness of Breath. Rescue  -     tiotropium-olodaterol (STIOLTO RESPIMAT) 2.5-2.5 mcg/actuation Mist; Inhale 1 Inhaler into the lungs once daily.    Chronic sinus complaints  -     montelukast (SINGULAIR) 10 mg tablet; Take 1 tablet (10 mg total) by mouth every evening.        Follow up in about 1 year (around 1/22/2021), or if symptoms worsen or fail to improve.    Discussed with patient above for education the following:      Patient Instructions   No great changes, need prompt evaluation for lung problems.    May need antibiotic.    You need stiolto

## 2020-02-24 RX ORDER — ALLOPURINOL 100 MG/1
TABLET ORAL
Qty: 90 TABLET | Refills: 1 | Status: SHIPPED | OUTPATIENT
Start: 2020-02-24 | End: 2020-06-25

## 2020-02-24 RX ORDER — POTASSIUM CHLORIDE 20 MEQ/1
TABLET, EXTENDED RELEASE ORAL
Qty: 360 TABLET | Refills: 1 | Status: SHIPPED | OUTPATIENT
Start: 2020-02-24 | End: 2020-06-25

## 2020-03-31 RX ORDER — BUMETANIDE 2 MG/1
TABLET ORAL
Qty: 90 TABLET | Refills: 1 | Status: SHIPPED | OUTPATIENT
Start: 2020-03-31 | End: 2020-11-16

## 2020-04-02 RX ORDER — ALLOPURINOL 300 MG/1
TABLET ORAL
Qty: 90 TABLET | Refills: 3 | Status: SHIPPED | OUTPATIENT
Start: 2020-04-02 | End: 2020-07-14 | Stop reason: CLARIF

## 2020-04-20 RX ORDER — TRAZODONE HYDROCHLORIDE 100 MG/1
TABLET ORAL
Qty: 90 TABLET | Refills: 3 | Status: SHIPPED | OUTPATIENT
Start: 2020-04-20 | End: 2021-05-11

## 2020-04-22 DIAGNOSIS — M51.36 DDD (DEGENERATIVE DISC DISEASE), LUMBAR: ICD-10-CM

## 2020-04-22 RX ORDER — IBUPROFEN 800 MG/1
TABLET ORAL
Qty: 270 TABLET | Refills: 1 | Status: SHIPPED | OUTPATIENT
Start: 2020-04-22 | End: 2021-03-29 | Stop reason: SDUPTHER

## 2020-04-30 DIAGNOSIS — J44.9 CHRONIC OBSTRUCTIVE PULMONARY DISEASE, UNSPECIFIED COPD TYPE: ICD-10-CM

## 2020-04-30 DIAGNOSIS — J44.9 COPD MIXED TYPE: ICD-10-CM

## 2020-05-05 DIAGNOSIS — J44.9 COPD MIXED TYPE: ICD-10-CM

## 2020-05-05 DIAGNOSIS — J44.9 CHRONIC OBSTRUCTIVE PULMONARY DISEASE, UNSPECIFIED COPD TYPE: ICD-10-CM

## 2020-05-21 DIAGNOSIS — R73.03 BORDERLINE DIABETES: ICD-10-CM

## 2020-05-21 RX ORDER — METFORMIN HYDROCHLORIDE 500 MG/1
TABLET, EXTENDED RELEASE ORAL
Qty: 90 TABLET | Refills: 1 | Status: SHIPPED | OUTPATIENT
Start: 2020-05-21 | End: 2020-11-12

## 2020-06-25 DIAGNOSIS — J44.9 COPD MIXED TYPE: ICD-10-CM

## 2020-06-25 DIAGNOSIS — J45.40 MODERATE PERSISTENT ASTHMA, UNCOMPLICATED: ICD-10-CM

## 2020-06-25 DIAGNOSIS — R09.89 CHRONIC SINUS COMPLAINTS: ICD-10-CM

## 2020-06-25 RX ORDER — ROSUVASTATIN CALCIUM 5 MG/1
TABLET, COATED ORAL
Qty: 30 TABLET | Refills: 0 | Status: SHIPPED | OUTPATIENT
Start: 2020-06-25 | End: 2020-08-03

## 2020-06-25 RX ORDER — POTASSIUM CHLORIDE 20 MEQ/1
TABLET, EXTENDED RELEASE ORAL
Qty: 120 TABLET | Refills: 0 | Status: SHIPPED | OUTPATIENT
Start: 2020-06-25 | End: 2020-07-14 | Stop reason: CLARIF

## 2020-06-25 RX ORDER — ALLOPURINOL 100 MG/1
TABLET ORAL
Qty: 30 TABLET | Refills: 0 | Status: SHIPPED | OUTPATIENT
Start: 2020-06-25 | End: 2020-08-03

## 2020-06-25 RX ORDER — MONTELUKAST SODIUM 10 MG/1
10 TABLET ORAL NIGHTLY
Qty: 90 TABLET | Refills: 3 | Status: SHIPPED | OUTPATIENT
Start: 2020-06-25 | End: 2021-08-24 | Stop reason: SDUPTHER

## 2020-07-29 ENCOUNTER — OFFICE VISIT (OUTPATIENT)
Dept: PRIMARY CARE CLINIC | Facility: CLINIC | Age: 55
End: 2020-07-29
Payer: MEDICARE

## 2020-07-29 ENCOUNTER — CLINICAL SUPPORT (OUTPATIENT)
Dept: PRIMARY CARE CLINIC | Facility: CLINIC | Age: 55
End: 2020-07-29
Payer: MEDICARE

## 2020-07-29 VITALS
BODY MASS INDEX: 38.03 KG/M2 | HEART RATE: 93 BPM | RESPIRATION RATE: 20 BRPM | DIASTOLIC BLOOD PRESSURE: 74 MMHG | HEIGHT: 70 IN | SYSTOLIC BLOOD PRESSURE: 138 MMHG | TEMPERATURE: 98 F | WEIGHT: 265.63 LBS | OXYGEN SATURATION: 94 %

## 2020-07-29 DIAGNOSIS — Z12.5 PROSTATE CANCER SCREENING: ICD-10-CM

## 2020-07-29 DIAGNOSIS — R73.03 BORDERLINE DIABETES: ICD-10-CM

## 2020-07-29 DIAGNOSIS — R73.03 BORDERLINE DIABETES: Primary | ICD-10-CM

## 2020-07-29 DIAGNOSIS — K51.919 ULCERATIVE COLITIS WITH COMPLICATION, UNSPECIFIED LOCATION: ICD-10-CM

## 2020-07-29 DIAGNOSIS — Z11.4 SCREENING FOR HIV (HUMAN IMMUNODEFICIENCY VIRUS): ICD-10-CM

## 2020-07-29 DIAGNOSIS — M51.36 DDD (DEGENERATIVE DISC DISEASE), LUMBAR: ICD-10-CM

## 2020-07-29 DIAGNOSIS — E66.01 SEVERE OBESITY (BMI 35.0-39.9) WITH COMORBIDITY: ICD-10-CM

## 2020-07-29 DIAGNOSIS — J44.9 COPD MIXED TYPE: ICD-10-CM

## 2020-07-29 LAB
ALBUMIN SERPL BCP-MCNC: 4.6 G/DL (ref 3.5–5.2)
ALP SERPL-CCNC: 48 U/L (ref 38–126)
ALT SERPL W/O P-5'-P-CCNC: 52 U/L (ref 17–63)
ANION GAP SERPL CALC-SCNC: 15 MMOL/L (ref 8–16)
AST SERPL-CCNC: 75 U/L (ref 15–41)
BILIRUB SERPL-MCNC: 1 MG/DL (ref 0.3–1.2)
BUN SERPL-MCNC: 13 MG/DL (ref 6–20)
CALCIUM SERPL-MCNC: 9.4 MG/DL (ref 8.6–10)
CHLORIDE SERPL-SCNC: 97 MMOL/L (ref 101–111)
CO2 SERPL-SCNC: 27 MMOL/L (ref 23–29)
COMPLEXED PSA SERPL-MCNC: 0.83 NG/ML (ref 0–4)
CREAT SERPL-MCNC: 0.9 MG/DL (ref 0.5–1.4)
EST. GFR  (AFRICAN AMERICAN): >60 ML/MIN/1.73 M^2
EST. GFR  (NON AFRICAN AMERICAN): >60 ML/MIN/1.73 M^2
ESTIMATED AVG GLUCOSE: 128 MG/DL (ref 68–131)
GLUCOSE SERPL-MCNC: 115 MG/DL (ref 74–118)
HBA1C MFR BLD HPLC: 6.1 % (ref 4–5.6)
POTASSIUM SERPL-SCNC: 3.7 MMOL/L (ref 3.5–5.1)
PROT SERPL-MCNC: 7.9 G/DL (ref 6–8.4)
SODIUM SERPL-SCNC: 139 MMOL/L (ref 136–145)

## 2020-07-29 PROCEDURE — 83036 HEMOGLOBIN GLYCOSYLATED A1C: CPT

## 2020-07-29 PROCEDURE — 86703 HIV-1/HIV-2 1 RESULT ANTBDY: CPT

## 2020-07-29 PROCEDURE — 84153 ASSAY OF PSA TOTAL: CPT

## 2020-07-29 PROCEDURE — 36415 PR COLLECTION VENOUS BLOOD,VENIPUNCTURE: ICD-10-PCS | Mod: S$GLB,,, | Performed by: FAMILY MEDICINE

## 2020-07-29 PROCEDURE — 36415 COLL VENOUS BLD VENIPUNCTURE: CPT | Mod: S$GLB,,, | Performed by: FAMILY MEDICINE

## 2020-07-29 PROCEDURE — 99214 OFFICE O/P EST MOD 30 MIN: CPT | Mod: S$GLB,,, | Performed by: FAMILY MEDICINE

## 2020-07-29 PROCEDURE — 99214 PR OFFICE/OUTPT VISIT, EST, LEVL IV, 30-39 MIN: ICD-10-PCS | Mod: S$GLB,,, | Performed by: FAMILY MEDICINE

## 2020-07-29 PROCEDURE — 3008F BODY MASS INDEX DOCD: CPT | Mod: CPTII,S$GLB,, | Performed by: FAMILY MEDICINE

## 2020-07-29 PROCEDURE — 99999 PR PBB SHADOW E&M-EST. PATIENT-LVL III: ICD-10-PCS | Mod: PBBFAC,,, | Performed by: FAMILY MEDICINE

## 2020-07-29 PROCEDURE — 80053 COMPREHEN METABOLIC PANEL: CPT

## 2020-07-29 PROCEDURE — 99999 PR PBB SHADOW E&M-EST. PATIENT-LVL III: CPT | Mod: PBBFAC,,, | Performed by: FAMILY MEDICINE

## 2020-07-29 PROCEDURE — 3008F PR BODY MASS INDEX (BMI) DOCUMENTED: ICD-10-PCS | Mod: CPTII,S$GLB,, | Performed by: FAMILY MEDICINE

## 2020-07-29 RX ORDER — HYDROCODONE BITARTRATE AND ACETAMINOPHEN 10; 325 MG/1; MG/1
1 TABLET ORAL EVERY 8 HOURS PRN
Qty: 90 TABLET | Refills: 0 | Status: SHIPPED | OUTPATIENT
Start: 2020-07-29 | End: 2020-12-09 | Stop reason: SDUPTHER

## 2020-07-29 NOTE — PROGRESS NOTES
"Subjective:       Patient ID: Mina Zelaya is a 54 y.o. male.    Chief Complaint: Pre-diabetes (here for a check up )    Prediabetes-not checking his blood sugar, but compliant with metformin.  Ulcer colitis-relatively stable, followed by GI  Lumbar degenerative disc disease-chronic, has flare-ups from time to time.  Over the past few years, has been getting approximately 1 prescription per year for Norco 10 mg, last filled over a year ago, requesting refill    Review of Systems   Constitutional: Negative for chills and fever.   HENT: Negative for trouble swallowing.    Respiratory: Positive for shortness of breath (chronic).    Cardiovascular: Negative for chest pain.   Gastrointestinal: Positive for blood in stool.   Musculoskeletal: Positive for back pain.   Skin: Negative for rash and wound.   Hematological: Does not bruise/bleed easily.   Psychiatric/Behavioral: Negative for agitation and confusion.       Objective:      Vitals:    07/29/20 1136   BP: 138/74   BP Location: Left arm   Patient Position: Sitting   BP Method: Large (Manual)   Pulse: 93   Resp: 20   Temp: 98.2 °F (36.8 °C)   TempSrc: Oral   SpO2: (!) 94%   Weight: 120.5 kg (265 lb 10.5 oz)   Height: 5' 10" (1.778 m)     Physical Exam  Vitals signs and nursing note reviewed.   Constitutional:       Appearance: He is well-developed.   HENT:      Head: Normocephalic and atraumatic.   Cardiovascular:      Rate and Rhythm: Normal rate and regular rhythm.      Heart sounds: Normal heart sounds.   Pulmonary:      Effort: Pulmonary effort is normal.      Breath sounds: Normal breath sounds.   Musculoskeletal:      Lumbar back: He exhibits decreased range of motion and tenderness.   Skin:     General: Skin is warm and dry.   Neurological:      Mental Status: He is alert and oriented to person, place, and time.         Lab Results   Component Value Date    WBC 6.90 07/14/2020    HGB 14.2 07/14/2020    HCT 41.5 07/14/2020     07/14/2020    CHOL 238 " (H) 12/30/2019    TRIG 91 12/30/2019    HDL 92 (H) 12/30/2019    ALT 45 07/14/2020    AST 69 (H) 07/14/2020     07/14/2020    K 3.4 (L) 07/14/2020    CL 99 (L) 07/14/2020    CREATININE 1.0 07/14/2020    BUN 16 07/14/2020    CO2 26 07/14/2020    TSH 1.31 02/01/2018    PSA 0.88 02/01/2018    INR 1.0 07/14/2020    HGBA1C 5.8 (H) 02/11/2019      Assessment:       1. Borderline diabetes    2. Severe obesity (BMI 35.0-39.9) with comorbidity    3. Ulcerative colitis with complication, unspecified location    4. Screening for HIV (human immunodeficiency virus)    5. Prostate cancer screening    6. DDD (degenerative disc disease), lumbar    7. COPD mixed type        Plan:       Borderline diabetes  -     Comprehensive metabolic panel; Future; Expected date: 07/29/2020  -     Hemoglobin A1C; Future; Expected date: 07/29/2020  Labs today, continue low-dose metformin  Severe obesity (BMI 35.0-39.9) with comorbidity  Low carb diet  Ulcerative colitis with complication, unspecified location  -     Comprehensive metabolic panel; Future; Expected date: 07/29/2020  Follow-up with GI  Screening for HIV (human immunodeficiency virus)  -     HIV 1/2 Ag/Ab (4th Gen); Future; Expected date: 07/29/2020    Prostate cancer screening  -     PSA, Screening; Future; Expected date: 07/29/2020    DDD (degenerative disc disease), lumbar  Comments:  stable on current Rx  Orders:  -     HYDROcodone-acetaminophen (NORCO)  mg per tablet; Take 1 tablet by mouth every 8 (eight) hours as needed for Pain.  Dispense: 90 tablet; Refill: 0  Advised to use meds as sparingly as possible  COPD mixed type  Stable, uses oxygen at night    Medication List with Changes/Refills   Current Medications    ALBUTEROL-IPRATROPIUM (DUO-NEB) 2.5 MG-0.5 MG/3 ML NEBULIZER SOLUTION    Take 3 mLs by nebulization every 6 (six) hours as needed for Wheezing or Shortness of Breath. Rescue    ALLOPURINOL (ZYLOPRIM) 100 MG TABLET    TAKE 1 TABLET EVERY DAY    ASCORBIC  ACID, VITAMIN C, (VITAMIN C) 1000 MG TABLET    Take 1,000 mg by mouth once daily.    ASPIRIN (ASPIR-81) 81 MG EC TABLET    Take 81 mg by mouth once daily.    BUMETANIDE (BUMEX) 2 MG TABLET    TAKE 1 TABLET EVERY DAY    CALCIUM CARBONATE (OS-VÍCTOR) 600 MG (1,500 MG) TAB    Take 600 mg by mouth once daily.    DIAZEPAM (VALIUM) 10 MG TAB    TAKE 1 TABLET BY MOUTH DAILY AS NEEDED FOR ANXIETY    FISH OIL-OMEGA-3 FATTY ACIDS 300-1,000 MG CAPSULE    Take 1,200 mg by mouth once daily.     MG TABLET    TAKE 1 TABLET (800 MG TOTAL) BY MOUTH 3 (THREE) TIMES DAILY.    KLOR-CON M20 20 MEQ TABLET    TAKE 2 TABLETS TWICE DAILY    LORATADINE (CLARITIN) 10 MG TABLET    Take 10 mg by mouth once daily.    METFORMIN (GLUCOPHAGE-XR) 500 MG XR 24HR TABLET    TAKE 1 TABLET EVERY MORNING  WITH  BREAKFAST    MONTELUKAST (SINGULAIR) 10 MG TABLET    Take 1 tablet (10 mg total) by mouth every evening.    ROSUVASTATIN (CRESTOR) 5 MG TABLET    TAKE 1 TABLET EVERY DAY    SILDENAFIL (VIAGRA) 100 MG TABLET    Take 1 tablet (100 mg total) by mouth daily as needed for Erectile Dysfunction.    TIOTROPIUM-OLODATEROL (STIOLTO RESPIMAT) 2.5-2.5 MCG/ACTUATION MIST    Inhale 1 Inhaler into the lungs once daily.    TRAZODONE (DESYREL) 100 MG TABLET    TAKE 1 TABLET AT BEDTIME    VENTOLIN HFA 90 MCG/ACTUATION INHALER    Inhale 1 puff into the lungs 4 (four) times daily as needed.   Changed and/or Refilled Medications    Modified Medication Previous Medication    HYDROCODONE-ACETAMINOPHEN (NORCO)  MG PER TABLET HYDROcodone-acetaminophen (NORCO)  mg per tablet       Take 1 tablet by mouth every 8 (eight) hours as needed for Pain.    Take 1 tablet by mouth every 8 (eight) hours as needed for Pain.   Discontinued Medications    ADALIMUMAB (HUMIRA) 40 MG/0.8 ML SYKT INJECTION    Inject 40 mg into the skin every 14 (fourteen) days.    TOFACITINIB (XELJANZ XR) 11 MG TB24    Take 11 mg by mouth.

## 2020-07-30 LAB — HIV 1+2 AB+HIV1 P24 AG SERPL QL IA: NEGATIVE

## 2020-08-06 LAB
A1C: 5.8
MICROALBUMIN URINE RANDOM: 7

## 2020-08-14 ENCOUNTER — OFFICE VISIT (OUTPATIENT)
Dept: PRIMARY CARE CLINIC | Facility: CLINIC | Age: 55
End: 2020-08-14
Payer: MEDICARE

## 2020-08-14 DIAGNOSIS — M51.36 DDD (DEGENERATIVE DISC DISEASE), LUMBAR: Primary | ICD-10-CM

## 2020-08-14 PROCEDURE — 99441 PR PHYSICIAN TELEPHONE EVALUATION 5-10 MIN: CPT | Mod: 95,,, | Performed by: FAMILY MEDICINE

## 2020-08-14 PROCEDURE — 99441 PR PHYSICIAN TELEPHONE EVALUATION 5-10 MIN: ICD-10-PCS | Mod: 95,,, | Performed by: FAMILY MEDICINE

## 2020-08-14 NOTE — PROGRESS NOTES
Established Patient - Audio Only Telehealth Visit     The patient location is:  Home  The chief complaint leading to consultation is:  Pain medication  Visit type: Virtual visit with audio only (telephone)  Total time spent with patient:  5 minutes       The reason for the audio only service rather than synchronous audio and video virtual visit was related to technical difficulties or patient preference/necessity.     Each patient to whom I provide medical services by telemedicine is:  (1) informed of the relationship between the physician and patient and the respective role of any other health care provider with respect to management of the patient; and (2) notified that they may decline to receive medical services by telemedicine and may withdraw from such care at any time. Patient verbally consented to receive this service via voice-only telephone call.       HPI:  Takes hydrocodone occasionally for chronic back pain, only pills prescription about once a year.  I gave him a prescription on 07/29.  He is certain that he filled at the pharmacy, but says he cannot find his medication.  Says he does not need it right now, wants to know when he could feel it again.     Assessment and plan:  DDD (degenerative disc disease), lumbar  [place]  reviewed.  I do not see any recent prescription fills for hydrocodone.  Patient was encouraged to contact his pharmacy, to make sure that he actually filled this prescription.  In any event, he cannot refill it until at least 30 days from his last dispense date.  Patient verbalized understanding and will call as needed                          This service was not originating from a related E/M service provided within the previous 7 days nor will  to an E/M service or procedure within the next 24 hours or my soonest available appointment.  Prevailing standard of care was able to be met in this audio-only visit.

## 2020-08-18 LAB — Lab: <7

## 2020-09-17 NOTE — TELEPHONE ENCOUNTER
Pt has refills on file, called pharmacy and confirmed refills ----- Message from Katherine Doe sent at 9/17/2020  8:04 AM CDT -----  Contact: pt  Type:  RX Refill Request    Who Called:  PT    Refill or New Rx:  refill  RX Name and Strength:  inhaler  How is the patient currently taking it? (ex. 1XDay):  As Directed  Is this a 30 day or 90 day RX:  as Directed  Preferred Pharmacy with phone number:   [id number] - [place] - [address] AT [facility] [place] [address]  Phone: [phone] Fax: [phone]  Best Call Back Number:  [phone]  Additional Information:  Please Advise ---Thank you

## 2020-09-18 DIAGNOSIS — J44.9 COPD MIXED TYPE: ICD-10-CM

## 2020-09-18 DIAGNOSIS — J45.40 MODERATE PERSISTENT ASTHMA, UNCOMPLICATED: ICD-10-CM

## 2020-09-18 DIAGNOSIS — J44.9 CHRONIC OBSTRUCTIVE PULMONARY DISEASE, UNSPECIFIED COPD TYPE: ICD-10-CM

## 2020-09-18 RX ORDER — ALBUTEROL SULFATE 0.83 MG/ML
2.5 SOLUTION RESPIRATORY (INHALATION) EVERY 6 HOURS PRN
Qty: 360 EACH | Refills: 3 | Status: CANCELLED | OUTPATIENT
Start: 2020-09-18 | End: 2021-09-18

## 2020-09-18 RX ORDER — ALBUTEROL SULFATE 0.83 MG/ML
2.5 SOLUTION RESPIRATORY (INHALATION) EVERY 6 HOURS PRN
Qty: 360 EACH | Refills: 3 | Status: SHIPPED | OUTPATIENT
Start: 2020-09-18 | End: 2021-09-18

## 2020-11-25 ENCOUNTER — PATIENT OUTREACH (OUTPATIENT)
Dept: ADMINISTRATIVE | Facility: HOSPITAL | Age: 55
End: 2020-11-25

## 2020-12-09 ENCOUNTER — OFFICE VISIT (OUTPATIENT)
Dept: PRIMARY CARE CLINIC | Facility: CLINIC | Age: 55
End: 2020-12-09
Payer: MEDICARE

## 2020-12-09 VITALS
HEIGHT: 70 IN | BODY MASS INDEX: 38.43 KG/M2 | RESPIRATION RATE: 18 BRPM | DIASTOLIC BLOOD PRESSURE: 70 MMHG | OXYGEN SATURATION: 95 % | SYSTOLIC BLOOD PRESSURE: 132 MMHG | WEIGHT: 268.44 LBS | HEART RATE: 103 BPM

## 2020-12-09 DIAGNOSIS — M51.36 DDD (DEGENERATIVE DISC DISEASE), LUMBAR: ICD-10-CM

## 2020-12-09 DIAGNOSIS — M51.36 DDD (DEGENERATIVE DISC DISEASE), LUMBAR: Primary | ICD-10-CM

## 2020-12-09 DIAGNOSIS — Z23 NEED FOR VACCINATION: ICD-10-CM

## 2020-12-09 PROCEDURE — 90686 FLU VACCINE (QUAD) GREATER THAN OR EQUAL TO 3YO PRESERVATIVE FREE IM: ICD-10-PCS | Mod: S$GLB,,, | Performed by: FAMILY MEDICINE

## 2020-12-09 PROCEDURE — 99999 PR PBB SHADOW E&M-EST. PATIENT-LVL III: CPT | Mod: PBBFAC,,, | Performed by: FAMILY MEDICINE

## 2020-12-09 PROCEDURE — 99999 PR PBB SHADOW E&M-EST. PATIENT-LVL III: ICD-10-PCS | Mod: PBBFAC,,, | Performed by: FAMILY MEDICINE

## 2020-12-09 PROCEDURE — 3008F PR BODY MASS INDEX (BMI) DOCUMENTED: ICD-10-PCS | Mod: CPTII,S$GLB,, | Performed by: FAMILY MEDICINE

## 2020-12-09 PROCEDURE — 99214 PR OFFICE/OUTPT VISIT, EST, LEVL IV, 30-39 MIN: ICD-10-PCS | Mod: 25,S$GLB,, | Performed by: FAMILY MEDICINE

## 2020-12-09 PROCEDURE — 3008F BODY MASS INDEX DOCD: CPT | Mod: CPTII,S$GLB,, | Performed by: FAMILY MEDICINE

## 2020-12-09 PROCEDURE — G0008 ADMIN INFLUENZA VIRUS VAC: HCPCS | Mod: S$GLB,,, | Performed by: FAMILY MEDICINE

## 2020-12-09 PROCEDURE — 1125F AMNT PAIN NOTED PAIN PRSNT: CPT | Mod: S$GLB,,, | Performed by: FAMILY MEDICINE

## 2020-12-09 PROCEDURE — 1125F PR PAIN SEVERITY QUANTIFIED, PAIN PRESENT: ICD-10-PCS | Mod: S$GLB,,, | Performed by: FAMILY MEDICINE

## 2020-12-09 PROCEDURE — 99214 OFFICE O/P EST MOD 30 MIN: CPT | Mod: 25,S$GLB,, | Performed by: FAMILY MEDICINE

## 2020-12-09 PROCEDURE — G0008 FLU VACCINE (QUAD) GREATER THAN OR EQUAL TO 3YO PRESERVATIVE FREE IM: ICD-10-PCS | Mod: S$GLB,,, | Performed by: FAMILY MEDICINE

## 2020-12-09 PROCEDURE — 90686 IIV4 VACC NO PRSV 0.5 ML IM: CPT | Mod: S$GLB,,, | Performed by: FAMILY MEDICINE

## 2020-12-09 RX ORDER — TOFACITINIB 11 MG/1
11 TABLET, FILM COATED, EXTENDED RELEASE ORAL DAILY
COMMUNITY

## 2020-12-09 RX ORDER — HYDROCODONE BITARTRATE AND ACETAMINOPHEN 10; 325 MG/1; MG/1
1 TABLET ORAL EVERY 8 HOURS PRN
Qty: 90 TABLET | Refills: 0 | Status: SHIPPED | OUTPATIENT
Start: 2020-12-09 | End: 2021-08-24 | Stop reason: SDUPTHER

## 2020-12-09 RX ORDER — TIZANIDINE 4 MG/1
4 TABLET ORAL 3 TIMES DAILY PRN
Qty: 30 TABLET | Refills: 0 | Status: SHIPPED | OUTPATIENT
Start: 2020-12-09 | End: 2020-12-19

## 2020-12-09 NOTE — PROGRESS NOTES
Verified pt ID using name and . Verified allergies. Administered flu vaccine in left deltoid per physician order using aseptic technique. Aspirated and no blood return noted. Pt tolerated well with no adverse reactions noted.

## 2020-12-09 NOTE — PROGRESS NOTES
"Subjective:       Patient ID: Mina Zelaya is a 55 y.o. male.    Chief Complaint: Back Pain    Worsened bilateral lower back pain for the past ~6 weeks, worse on the right. Has good and bad days. Pain does not radiate to legs. No bowel or bladder incontinence, but toes feel numb on both feet.    Review of Systems   Constitutional: Negative for chills and fever.   Respiratory: Positive for shortness of breath (chronic).    Cardiovascular: Negative for chest pain.   Musculoskeletal: Positive for back pain.   Neurological: Positive for numbness. Negative for weakness.   Psychiatric/Behavioral: Negative for agitation and confusion.       Objective:      Vitals:    12/09/20 0929   BP: 132/70   BP Location: Left arm   Patient Position: Sitting   BP Method: Large (Manual)   Pulse: 103   Resp: 18   SpO2: 95%   Weight: 121.7 kg (268 lb 6.6 oz)   Height: 5' 10" (1.778 m)     Physical Exam  Vitals signs and nursing note reviewed.   Constitutional:       Appearance: He is well-developed.   HENT:      Head: Normocephalic and atraumatic.   Cardiovascular:      Rate and Rhythm: Normal rate and regular rhythm.      Heart sounds: Normal heart sounds.   Pulmonary:      Effort: Pulmonary effort is normal.      Breath sounds: Normal breath sounds.   Musculoskeletal:      Lumbar back: He exhibits decreased range of motion (positive straight leg raise bilaterally). He exhibits no bony tenderness.   Skin:     General: Skin is warm and dry.   Neurological:      Mental Status: He is alert and oriented to person, place, and time.      Motor: Motor function is intact.      Deep Tendon Reflexes:      Reflex Scores:       Patellar reflexes are 2+ on the right side and 2+ on the left side.        Lab Results   Component Value Date    WBC 6.90 07/14/2020    HGB 14.2 07/14/2020    HCT 41.5 07/14/2020     07/14/2020    CHOL 238 (H) 12/30/2019    TRIG 91 12/30/2019    HDL 92 (H) 12/30/2019    ALT 52 07/29/2020    AST 75 (H) 07/29/2020    NA " 139 07/29/2020    K 3.7 07/29/2020    CL 97 (L) 07/29/2020    CREATININE 0.9 07/29/2020    BUN 13 07/29/2020    CO2 27 07/29/2020    TSH 1.31 02/01/2018    PSA 0.83 07/29/2020    INR 1.0 07/14/2020    HGBA1C 6.1 (H) 07/29/2020      Assessment:       1. DDD (degenerative disc disease), lumbar    2. Need for vaccination    3. DDD (degenerative disc disease), lumbar        Plan:       DDD (degenerative disc disease), lumbar  -     HYDROcodone-acetaminophen (NORCO)  mg per tablet; Take 1 tablet by mouth every 8 (eight) hours as needed for Pain.  Dispense: 90 tablet; Refill: 0  -     tiZANidine (ZANAFLEX) 4 MG tablet; Take 1 tablet (4 mg total) by mouth 3 (three) times daily as needed (muscle spasm).  Dispense: 30 tablet; Refill: 0    Need for vaccination  -     Influenza - Quadrivalent (PF)    DDD (degenerative disc disease), lumbar  Comments:  stable on current Rx  Orders:  -     HYDROcodone-acetaminophen (NORCO)  mg per tablet; Take 1 tablet by mouth every 8 (eight) hours as needed for Pain.  Dispense: 90 tablet; Refill: 0  -     tiZANidine (ZANAFLEX) 4 MG tablet; Take 1 tablet (4 mg total) by mouth 3 (three) times daily as needed (muscle spasm).  Dispense: 30 tablet; Refill: 0      Medication List with Changes/Refills   New Medications    TIZANIDINE (ZANAFLEX) 4 MG TABLET    Take 1 tablet (4 mg total) by mouth 3 (three) times daily as needed (muscle spasm).   Current Medications    ALBUTEROL (PROVENTIL) 2.5 MG /3 ML (0.083 %) NEBULIZER SOLUTION    Take 3 mLs (2.5 mg total) by nebulization every 6 (six) hours as needed for Wheezing.    ALBUTEROL-IPRATROPIUM (DUO-NEB) 2.5 MG-0.5 MG/3 ML NEBULIZER SOLUTION    Take 3 mLs by nebulization every 6 (six) hours as needed for Wheezing or Shortness of Breath. Rescue    ALLOPURINOL (ZYLOPRIM) 100 MG TABLET    TAKE 1 TABLET EVERY DAY    ASCORBIC ACID, VITAMIN C, (VITAMIN C) 1000 MG TABLET    Take 1,000 mg by mouth once daily.    ASPIRIN (ASPIR-81) 81 MG EC TABLET     Take 81 mg by mouth once daily.    BUMETANIDE (BUMEX) 2 MG TABLET    TAKE 1 TABLET EVERY DAY    CALCIUM CARBONATE (OS-VÍCTOR) 600 MG (1,500 MG) TAB    Take 600 mg by mouth once daily.    DIAZEPAM (VALIUM) 10 MG TAB    TAKE 1 TABLET BY MOUTH DAILY AS NEEDED FOR ANXIETY    FISH OIL-OMEGA-3 FATTY ACIDS 300-1,000 MG CAPSULE    Take 1,200 mg by mouth once daily.     MG TABLET    TAKE 1 TABLET (800 MG TOTAL) BY MOUTH 3 (THREE) TIMES DAILY.    LORATADINE (CLARITIN) 10 MG TABLET    Take 10 mg by mouth once daily.    METFORMIN (GLUCOPHAGE-XR) 500 MG ER 24HR TABLET    TAKE 1 TABLET EVERY MORNING  WITH  BREAKFAST    MONTELUKAST (SINGULAIR) 10 MG TABLET    Take 1 tablet (10 mg total) by mouth every evening.    POTASSIUM CHLORIDE SA (K-DUR,KLOR-CON) 20 MEQ TABLET    TAKE 2 TABLETS TWICE DAILY    ROSUVASTATIN (CRESTOR) 5 MG TABLET    TAKE 1 TABLET EVERY DAY    TIOTROPIUM-OLODATEROL (STIOLTO RESPIMAT) 2.5-2.5 MCG/ACTUATION MIST    Inhale 1 Inhaler into the lungs once daily.    TOFACITINIB (XELJANZ XR) 11 MG TB24    Take 11 mg by mouth once daily.    TRAZODONE (DESYREL) 100 MG TABLET    TAKE 1 TABLET AT BEDTIME    VENTOLIN HFA 90 MCG/ACTUATION INHALER    Inhale 1 puff into the lungs 4 (four) times daily as needed.   Changed and/or Refilled Medications    Modified Medication Previous Medication    HYDROCODONE-ACETAMINOPHEN (NORCO)  MG PER TABLET HYDROcodone-acetaminophen (NORCO)  mg per tablet       Take 1 tablet by mouth every 8 (eight) hours as needed for Pain.    Take 1 tablet by mouth every 8 (eight) hours as needed for Pain.

## 2021-01-13 ENCOUNTER — PATIENT OUTREACH (OUTPATIENT)
Dept: ADMINISTRATIVE | Facility: HOSPITAL | Age: 56
End: 2021-01-13

## 2021-03-17 ENCOUNTER — TELEPHONE (OUTPATIENT)
Dept: PULMONOLOGY | Facility: CLINIC | Age: 56
End: 2021-03-17

## 2021-03-25 ENCOUNTER — OFFICE VISIT (OUTPATIENT)
Dept: PULMONOLOGY | Facility: CLINIC | Age: 56
End: 2021-03-25
Payer: MEDICARE

## 2021-03-25 VITALS
HEIGHT: 70 IN | WEIGHT: 270.38 LBS | HEART RATE: 96 BPM | BODY MASS INDEX: 38.71 KG/M2 | DIASTOLIC BLOOD PRESSURE: 67 MMHG | SYSTOLIC BLOOD PRESSURE: 138 MMHG | OXYGEN SATURATION: 94 %

## 2021-03-25 DIAGNOSIS — J44.9 CHRONIC OBSTRUCTIVE PULMONARY DISEASE, UNSPECIFIED COPD TYPE: ICD-10-CM

## 2021-03-25 DIAGNOSIS — J98.6 DIAPHRAGM PARALYSIS: Primary | ICD-10-CM

## 2021-03-25 DIAGNOSIS — J96.10 CHRONIC RESPIRATORY FAILURE, UNSPECIFIED WHETHER WITH HYPOXIA OR HYPERCAPNIA: ICD-10-CM

## 2021-03-25 DIAGNOSIS — J44.9 COPD MIXED TYPE: ICD-10-CM

## 2021-03-25 PROCEDURE — 99214 OFFICE O/P EST MOD 30 MIN: CPT | Mod: S$GLB,,, | Performed by: INTERNAL MEDICINE

## 2021-03-25 PROCEDURE — 99214 PR OFFICE/OUTPT VISIT, EST, LEVL IV, 30-39 MIN: ICD-10-PCS | Mod: S$GLB,,, | Performed by: INTERNAL MEDICINE

## 2021-03-25 PROCEDURE — 99999 PR PBB SHADOW E&M-EST. PATIENT-LVL IV: CPT | Mod: PBBFAC,,, | Performed by: INTERNAL MEDICINE

## 2021-03-25 PROCEDURE — 1126F PR PAIN SEVERITY QUANTIFIED, NO PAIN PRESENT: ICD-10-PCS | Mod: S$GLB,,, | Performed by: INTERNAL MEDICINE

## 2021-03-25 PROCEDURE — 3008F PR BODY MASS INDEX (BMI) DOCUMENTED: ICD-10-PCS | Mod: CPTII,S$GLB,, | Performed by: INTERNAL MEDICINE

## 2021-03-25 PROCEDURE — 99499 UNLISTED E&M SERVICE: CPT | Mod: S$GLB,,, | Performed by: INTERNAL MEDICINE

## 2021-03-25 PROCEDURE — 99999 PR PBB SHADOW E&M-EST. PATIENT-LVL IV: ICD-10-PCS | Mod: PBBFAC,,, | Performed by: INTERNAL MEDICINE

## 2021-03-25 PROCEDURE — 3008F BODY MASS INDEX DOCD: CPT | Mod: CPTII,S$GLB,, | Performed by: INTERNAL MEDICINE

## 2021-03-25 PROCEDURE — 1126F AMNT PAIN NOTED NONE PRSNT: CPT | Mod: S$GLB,,, | Performed by: INTERNAL MEDICINE

## 2021-03-25 PROCEDURE — 99499 RISK ADDL DX/OHS AUDIT: ICD-10-PCS | Mod: S$GLB,,, | Performed by: INTERNAL MEDICINE

## 2021-03-25 RX ORDER — TIOTROPIUM BROMIDE AND OLODATEROL 3.124; 2.736 UG/1; UG/1
2 SPRAY, METERED RESPIRATORY (INHALATION) DAILY
Qty: 8 G | Refills: 6 | Status: SHIPPED | OUTPATIENT
Start: 2021-03-25 | End: 2021-04-22 | Stop reason: SDUPTHER

## 2021-03-25 RX ORDER — ALLOPURINOL 300 MG/1
300 TABLET ORAL DAILY
COMMUNITY
End: 2021-04-29

## 2021-03-29 ENCOUNTER — OFFICE VISIT (OUTPATIENT)
Dept: PRIMARY CARE CLINIC | Facility: CLINIC | Age: 56
End: 2021-03-29
Payer: MEDICARE

## 2021-03-29 VITALS
BODY MASS INDEX: 38.46 KG/M2 | SYSTOLIC BLOOD PRESSURE: 138 MMHG | RESPIRATION RATE: 18 BRPM | WEIGHT: 268.63 LBS | OXYGEN SATURATION: 93 % | HEART RATE: 106 BPM | TEMPERATURE: 97 F | DIASTOLIC BLOOD PRESSURE: 64 MMHG | HEIGHT: 70 IN

## 2021-03-29 DIAGNOSIS — E66.01 SEVERE OBESITY (BMI 35.0-39.9) WITH COMORBIDITY: ICD-10-CM

## 2021-03-29 DIAGNOSIS — G47.33 OSA (OBSTRUCTIVE SLEEP APNEA): ICD-10-CM

## 2021-03-29 DIAGNOSIS — M51.36 DDD (DEGENERATIVE DISC DISEASE), LUMBAR: ICD-10-CM

## 2021-03-29 DIAGNOSIS — R73.03 BORDERLINE DIABETES: Primary | ICD-10-CM

## 2021-03-29 DIAGNOSIS — E78.5 HYPERLIPIDEMIA, UNSPECIFIED HYPERLIPIDEMIA TYPE: ICD-10-CM

## 2021-03-29 DIAGNOSIS — M79.641 RIGHT HAND PAIN: ICD-10-CM

## 2021-03-29 DIAGNOSIS — F41.9 ANXIETY: ICD-10-CM

## 2021-03-29 DIAGNOSIS — G62.9 NEUROPATHY: ICD-10-CM

## 2021-03-29 PROCEDURE — 1125F AMNT PAIN NOTED PAIN PRSNT: CPT | Mod: S$GLB,,, | Performed by: FAMILY MEDICINE

## 2021-03-29 PROCEDURE — 96372 THER/PROPH/DIAG INJ SC/IM: CPT | Mod: S$GLB,,, | Performed by: FAMILY MEDICINE

## 2021-03-29 PROCEDURE — 99214 PR OFFICE/OUTPT VISIT, EST, LEVL IV, 30-39 MIN: ICD-10-PCS | Mod: 25,S$GLB,, | Performed by: FAMILY MEDICINE

## 2021-03-29 PROCEDURE — 99999 PR PBB SHADOW E&M-EST. PATIENT-LVL V: CPT | Mod: PBBFAC,,, | Performed by: FAMILY MEDICINE

## 2021-03-29 PROCEDURE — 3008F PR BODY MASS INDEX (BMI) DOCUMENTED: ICD-10-PCS | Mod: CPTII,S$GLB,, | Performed by: FAMILY MEDICINE

## 2021-03-29 PROCEDURE — 99999 PR PBB SHADOW E&M-EST. PATIENT-LVL V: ICD-10-PCS | Mod: PBBFAC,,, | Performed by: FAMILY MEDICINE

## 2021-03-29 PROCEDURE — 3008F BODY MASS INDEX DOCD: CPT | Mod: CPTII,S$GLB,, | Performed by: FAMILY MEDICINE

## 2021-03-29 PROCEDURE — 1125F PR PAIN SEVERITY QUANTIFIED, PAIN PRESENT: ICD-10-PCS | Mod: S$GLB,,, | Performed by: FAMILY MEDICINE

## 2021-03-29 PROCEDURE — 96372 PR INJECTION,THERAP/PROPH/DIAG2ST, IM OR SUBCUT: ICD-10-PCS | Mod: S$GLB,,, | Performed by: FAMILY MEDICINE

## 2021-03-29 PROCEDURE — 99214 OFFICE O/P EST MOD 30 MIN: CPT | Mod: 25,S$GLB,, | Performed by: FAMILY MEDICINE

## 2021-03-29 RX ORDER — IBUPROFEN 800 MG/1
800 TABLET ORAL 3 TIMES DAILY
Qty: 270 TABLET | Refills: 1 | Status: SHIPPED | OUTPATIENT
Start: 2021-03-29 | End: 2022-01-06

## 2021-03-29 RX ORDER — METHYLPREDNISOLONE 4 MG/1
TABLET ORAL
Qty: 1 PACKAGE | Refills: 0 | Status: SHIPPED | OUTPATIENT
Start: 2021-03-29 | End: 2021-04-16

## 2021-03-29 RX ORDER — TRIAMCINOLONE ACETONIDE 40 MG/ML
40 INJECTION, SUSPENSION INTRA-ARTICULAR; INTRAMUSCULAR ONCE
Status: COMPLETED | OUTPATIENT
Start: 2021-03-29 | End: 2021-03-29

## 2021-03-29 RX ADMIN — TRIAMCINOLONE ACETONIDE 40 MG: 40 INJECTION, SUSPENSION INTRA-ARTICULAR; INTRAMUSCULAR at 02:03

## 2021-03-31 ENCOUNTER — TELEPHONE (OUTPATIENT)
Dept: PULMONOLOGY | Facility: CLINIC | Age: 56
End: 2021-03-31

## 2021-04-01 ENCOUNTER — TELEPHONE (OUTPATIENT)
Dept: PULMONOLOGY | Facility: CLINIC | Age: 56
End: 2021-04-01

## 2021-04-03 DIAGNOSIS — I77.89 OTHER SPECIFIED DISORDERS OF ARTERIES AND ARTERIOLES: ICD-10-CM

## 2021-04-03 DIAGNOSIS — L98.9 LESION OF NECK: Primary | ICD-10-CM

## 2021-04-16 ENCOUNTER — OFFICE VISIT (OUTPATIENT)
Dept: PRIMARY CARE CLINIC | Facility: CLINIC | Age: 56
End: 2021-04-16
Payer: MEDICARE

## 2021-04-16 VITALS
OXYGEN SATURATION: 97 % | WEIGHT: 265.63 LBS | DIASTOLIC BLOOD PRESSURE: 64 MMHG | RESPIRATION RATE: 18 BRPM | SYSTOLIC BLOOD PRESSURE: 132 MMHG | HEIGHT: 70 IN | HEART RATE: 98 BPM | BODY MASS INDEX: 38.03 KG/M2

## 2021-04-16 DIAGNOSIS — L01.02 PUSTULAR FOLLICULITIS: Primary | ICD-10-CM

## 2021-04-16 DIAGNOSIS — K51.919 ULCERATIVE COLITIS WITH COMPLICATION, UNSPECIFIED LOCATION: ICD-10-CM

## 2021-04-16 PROCEDURE — 1126F AMNT PAIN NOTED NONE PRSNT: CPT | Mod: S$GLB,,, | Performed by: FAMILY MEDICINE

## 2021-04-16 PROCEDURE — 1126F PR PAIN SEVERITY QUANTIFIED, NO PAIN PRESENT: ICD-10-PCS | Mod: S$GLB,,, | Performed by: FAMILY MEDICINE

## 2021-04-16 PROCEDURE — 99999 PR PBB SHADOW E&M-EST. PATIENT-LVL V: CPT | Mod: PBBFAC,,, | Performed by: FAMILY MEDICINE

## 2021-04-16 PROCEDURE — 99499 RISK ADDL DX/OHS AUDIT: ICD-10-PCS | Mod: S$GLB,,, | Performed by: FAMILY MEDICINE

## 2021-04-16 PROCEDURE — 99999 PR PBB SHADOW E&M-EST. PATIENT-LVL V: ICD-10-PCS | Mod: PBBFAC,,, | Performed by: FAMILY MEDICINE

## 2021-04-16 PROCEDURE — 3008F BODY MASS INDEX DOCD: CPT | Mod: CPTII,S$GLB,, | Performed by: FAMILY MEDICINE

## 2021-04-16 PROCEDURE — 3008F PR BODY MASS INDEX (BMI) DOCUMENTED: ICD-10-PCS | Mod: CPTII,S$GLB,, | Performed by: FAMILY MEDICINE

## 2021-04-16 PROCEDURE — 99499 UNLISTED E&M SERVICE: CPT | Mod: S$GLB,,, | Performed by: FAMILY MEDICINE

## 2021-04-16 PROCEDURE — 99213 PR OFFICE/OUTPT VISIT, EST, LEVL III, 20-29 MIN: ICD-10-PCS | Mod: S$GLB,,, | Performed by: FAMILY MEDICINE

## 2021-04-16 PROCEDURE — 99213 OFFICE O/P EST LOW 20 MIN: CPT | Mod: S$GLB,,, | Performed by: FAMILY MEDICINE

## 2021-04-16 RX ORDER — CHLORHEXIDINE GLUCONATE 40 MG/ML
SOLUTION TOPICAL
Qty: 118 ML | Refills: 3 | Status: SHIPPED | OUTPATIENT
Start: 2021-04-16 | End: 2021-09-28 | Stop reason: SDUPTHER

## 2021-04-16 RX ORDER — DOXYCYCLINE 100 MG/1
100 CAPSULE ORAL EVERY 12 HOURS
Qty: 20 CAPSULE | Refills: 0 | Status: SHIPPED | OUTPATIENT
Start: 2021-04-16 | End: 2021-04-26

## 2021-04-21 DIAGNOSIS — E87.6 HYPOKALEMIA: ICD-10-CM

## 2021-04-21 DIAGNOSIS — J44.9 COPD MIXED TYPE: ICD-10-CM

## 2021-04-21 DIAGNOSIS — J44.9 CHRONIC OBSTRUCTIVE PULMONARY DISEASE, UNSPECIFIED COPD TYPE: ICD-10-CM

## 2021-04-21 DIAGNOSIS — J45.40 MODERATE PERSISTENT ASTHMA, UNCOMPLICATED: ICD-10-CM

## 2021-04-21 RX ORDER — IPRATROPIUM BROMIDE AND ALBUTEROL SULFATE 2.5; .5 MG/3ML; MG/3ML
3 SOLUTION RESPIRATORY (INHALATION) EVERY 6 HOURS PRN
Qty: 120 VIAL | Refills: 5 | Status: SHIPPED | OUTPATIENT
Start: 2021-04-21 | End: 2023-11-14

## 2021-04-22 DIAGNOSIS — J44.9 CHRONIC OBSTRUCTIVE PULMONARY DISEASE, UNSPECIFIED COPD TYPE: ICD-10-CM

## 2021-04-22 DIAGNOSIS — J44.9 COPD MIXED TYPE: ICD-10-CM

## 2021-04-22 RX ORDER — POTASSIUM CHLORIDE 20 MEQ/1
TABLET, EXTENDED RELEASE ORAL
Qty: 360 TABLET | Refills: 1 | Status: SHIPPED | OUTPATIENT
Start: 2021-04-22 | End: 2021-08-18

## 2021-04-22 RX ORDER — TIOTROPIUM BROMIDE AND OLODATEROL 3.124; 2.736 UG/1; UG/1
2 SPRAY, METERED RESPIRATORY (INHALATION) DAILY
Qty: 8 G | Refills: 6 | Status: SHIPPED | OUTPATIENT
Start: 2021-04-22 | End: 2021-09-14 | Stop reason: SDUPTHER

## 2021-04-26 ENCOUNTER — TELEPHONE (OUTPATIENT)
Dept: PULMONOLOGY | Facility: CLINIC | Age: 56
End: 2021-04-26

## 2021-04-28 ENCOUNTER — TELEPHONE (OUTPATIENT)
Dept: PULMONOLOGY | Facility: CLINIC | Age: 56
End: 2021-04-28

## 2021-04-29 RX ORDER — ALLOPURINOL 300 MG/1
TABLET ORAL
Qty: 90 TABLET | Refills: 3 | Status: SHIPPED | OUTPATIENT
Start: 2021-04-29 | End: 2022-03-31

## 2021-05-10 DIAGNOSIS — R73.03 BORDERLINE DIABETES: ICD-10-CM

## 2021-05-11 RX ORDER — TRAZODONE HYDROCHLORIDE 100 MG/1
TABLET ORAL
Qty: 90 TABLET | Refills: 1 | Status: SHIPPED | OUTPATIENT
Start: 2021-05-11 | End: 2021-10-13

## 2021-05-11 RX ORDER — METFORMIN HYDROCHLORIDE 500 MG/1
TABLET, EXTENDED RELEASE ORAL
Qty: 90 TABLET | Refills: 1 | Status: SHIPPED | OUTPATIENT
Start: 2021-05-11 | End: 2021-10-13

## 2021-05-18 RX ORDER — ROSUVASTATIN CALCIUM 5 MG/1
TABLET, COATED ORAL
Qty: 90 TABLET | Refills: 1 | Status: SHIPPED | OUTPATIENT
Start: 2021-05-18 | End: 2021-09-27

## 2021-06-29 RX ORDER — ALLOPURINOL 100 MG/1
100 TABLET ORAL DAILY
Qty: 30 TABLET | Refills: 5 | Status: SHIPPED | OUTPATIENT
Start: 2021-06-29 | End: 2021-06-30 | Stop reason: SDUPTHER

## 2021-06-30 RX ORDER — ALLOPURINOL 100 MG/1
100 TABLET ORAL DAILY
Qty: 30 TABLET | Refills: 5 | Status: SHIPPED | OUTPATIENT
Start: 2021-06-30 | End: 2022-01-07

## 2021-07-08 RX ORDER — ALLOPURINOL 100 MG/1
100 TABLET ORAL DAILY
Qty: 30 TABLET | Refills: 5 | OUTPATIENT
Start: 2021-07-08

## 2021-07-19 ENCOUNTER — TELEPHONE (OUTPATIENT)
Dept: PRIMARY CARE CLINIC | Facility: CLINIC | Age: 56
End: 2021-07-19

## 2021-07-20 ENCOUNTER — OFFICE VISIT (OUTPATIENT)
Dept: PRIMARY CARE CLINIC | Facility: CLINIC | Age: 56
End: 2021-07-20
Payer: MEDICARE

## 2021-07-20 VITALS
OXYGEN SATURATION: 93 % | BODY MASS INDEX: 38.82 KG/M2 | RESPIRATION RATE: 22 BRPM | HEIGHT: 70 IN | SYSTOLIC BLOOD PRESSURE: 136 MMHG | HEART RATE: 106 BPM | WEIGHT: 271.19 LBS | DIASTOLIC BLOOD PRESSURE: 64 MMHG

## 2021-07-20 DIAGNOSIS — L03.115 CELLULITIS OF RIGHT LOWER LEG: Primary | ICD-10-CM

## 2021-07-20 PROCEDURE — 1126F PR PAIN SEVERITY QUANTIFIED, NO PAIN PRESENT: ICD-10-PCS | Mod: CPTII,S$GLB,, | Performed by: FAMILY MEDICINE

## 2021-07-20 PROCEDURE — 99999 PR PBB SHADOW E&M-EST. PATIENT-LVL V: CPT | Mod: PBBFAC,,, | Performed by: FAMILY MEDICINE

## 2021-07-20 PROCEDURE — 99214 OFFICE O/P EST MOD 30 MIN: CPT | Mod: S$GLB,,, | Performed by: FAMILY MEDICINE

## 2021-07-20 PROCEDURE — 1126F AMNT PAIN NOTED NONE PRSNT: CPT | Mod: CPTII,S$GLB,, | Performed by: FAMILY MEDICINE

## 2021-07-20 PROCEDURE — 99999 PR PBB SHADOW E&M-EST. PATIENT-LVL V: ICD-10-PCS | Mod: PBBFAC,,, | Performed by: FAMILY MEDICINE

## 2021-07-20 PROCEDURE — 3008F PR BODY MASS INDEX (BMI) DOCUMENTED: ICD-10-PCS | Mod: CPTII,S$GLB,, | Performed by: FAMILY MEDICINE

## 2021-07-20 PROCEDURE — 99214 PR OFFICE/OUTPT VISIT, EST, LEVL IV, 30-39 MIN: ICD-10-PCS | Mod: S$GLB,,, | Performed by: FAMILY MEDICINE

## 2021-07-20 PROCEDURE — 3008F BODY MASS INDEX DOCD: CPT | Mod: CPTII,S$GLB,, | Performed by: FAMILY MEDICINE

## 2021-07-20 RX ORDER — CEPHALEXIN 500 MG/1
500 CAPSULE ORAL EVERY 6 HOURS
Qty: 40 CAPSULE | Refills: 0 | Status: SHIPPED | OUTPATIENT
Start: 2021-07-20 | End: 2021-07-30

## 2021-08-03 ENCOUNTER — TELEPHONE (OUTPATIENT)
Dept: PRIMARY CARE CLINIC | Facility: CLINIC | Age: 56
End: 2021-08-03

## 2021-08-03 RX ORDER — DOXYCYCLINE 100 MG/1
100 CAPSULE ORAL EVERY 12 HOURS
Qty: 20 CAPSULE | Refills: 0 | Status: SHIPPED | OUTPATIENT
Start: 2021-08-03 | End: 2021-08-13

## 2021-08-20 ENCOUNTER — TELEPHONE (OUTPATIENT)
Dept: PRIMARY CARE CLINIC | Facility: CLINIC | Age: 56
End: 2021-08-20

## 2021-08-24 ENCOUNTER — OFFICE VISIT (OUTPATIENT)
Dept: PRIMARY CARE CLINIC | Facility: CLINIC | Age: 56
End: 2021-08-24
Payer: MEDICARE

## 2021-08-24 VITALS
SYSTOLIC BLOOD PRESSURE: 130 MMHG | RESPIRATION RATE: 20 BRPM | WEIGHT: 264.56 LBS | OXYGEN SATURATION: 94 % | HEART RATE: 74 BPM | DIASTOLIC BLOOD PRESSURE: 78 MMHG | BODY MASS INDEX: 37.87 KG/M2 | HEIGHT: 70 IN

## 2021-08-24 DIAGNOSIS — M51.36 DDD (DEGENERATIVE DISC DISEASE), LUMBAR: ICD-10-CM

## 2021-08-24 DIAGNOSIS — E78.5 HYPERLIPIDEMIA, UNSPECIFIED HYPERLIPIDEMIA TYPE: ICD-10-CM

## 2021-08-24 DIAGNOSIS — R09.89 CHRONIC SINUS COMPLAINTS: ICD-10-CM

## 2021-08-24 DIAGNOSIS — Z12.5 PROSTATE CANCER SCREENING: ICD-10-CM

## 2021-08-24 DIAGNOSIS — J45.40 MODERATE PERSISTENT ASTHMA, UNCOMPLICATED: ICD-10-CM

## 2021-08-24 DIAGNOSIS — J44.9 COPD MIXED TYPE: ICD-10-CM

## 2021-08-24 DIAGNOSIS — M79.661 RIGHT CALF PAIN: ICD-10-CM

## 2021-08-24 DIAGNOSIS — R73.03 BORDERLINE DIABETES: ICD-10-CM

## 2021-08-24 DIAGNOSIS — M25.552 LEFT HIP PAIN: Primary | ICD-10-CM

## 2021-08-24 PROCEDURE — 1159F MED LIST DOCD IN RCRD: CPT | Mod: CPTII,S$GLB,, | Performed by: FAMILY MEDICINE

## 2021-08-24 PROCEDURE — 3078F PR MOST RECENT DIASTOLIC BLOOD PRESSURE < 80 MM HG: ICD-10-PCS | Mod: CPTII,S$GLB,, | Performed by: FAMILY MEDICINE

## 2021-08-24 PROCEDURE — 3075F SYST BP GE 130 - 139MM HG: CPT | Mod: CPTII,S$GLB,, | Performed by: FAMILY MEDICINE

## 2021-08-24 PROCEDURE — 3075F PR MOST RECENT SYSTOLIC BLOOD PRESS GE 130-139MM HG: ICD-10-PCS | Mod: CPTII,S$GLB,, | Performed by: FAMILY MEDICINE

## 2021-08-24 PROCEDURE — 99999 PR PBB SHADOW E&M-EST. PATIENT-LVL III: CPT | Mod: PBBFAC,,, | Performed by: FAMILY MEDICINE

## 2021-08-24 PROCEDURE — 1125F PR PAIN SEVERITY QUANTIFIED, PAIN PRESENT: ICD-10-PCS | Mod: CPTII,S$GLB,, | Performed by: FAMILY MEDICINE

## 2021-08-24 PROCEDURE — 1160F PR REVIEW ALL MEDS BY PRESCRIBER/CLIN PHARMACIST DOCUMENTED: ICD-10-PCS | Mod: CPTII,S$GLB,, | Performed by: FAMILY MEDICINE

## 2021-08-24 PROCEDURE — 3008F PR BODY MASS INDEX (BMI) DOCUMENTED: ICD-10-PCS | Mod: CPTII,S$GLB,, | Performed by: FAMILY MEDICINE

## 2021-08-24 PROCEDURE — 1160F RVW MEDS BY RX/DR IN RCRD: CPT | Mod: CPTII,S$GLB,, | Performed by: FAMILY MEDICINE

## 2021-08-24 PROCEDURE — 99999 PR PBB SHADOW E&M-EST. PATIENT-LVL III: ICD-10-PCS | Mod: PBBFAC,,, | Performed by: FAMILY MEDICINE

## 2021-08-24 PROCEDURE — 99214 PR OFFICE/OUTPT VISIT, EST, LEVL IV, 30-39 MIN: ICD-10-PCS | Mod: S$GLB,,, | Performed by: FAMILY MEDICINE

## 2021-08-24 PROCEDURE — 3008F BODY MASS INDEX DOCD: CPT | Mod: CPTII,S$GLB,, | Performed by: FAMILY MEDICINE

## 2021-08-24 PROCEDURE — 3078F DIAST BP <80 MM HG: CPT | Mod: CPTII,S$GLB,, | Performed by: FAMILY MEDICINE

## 2021-08-24 PROCEDURE — 1159F PR MEDICATION LIST DOCUMENTED IN MEDICAL RECORD: ICD-10-PCS | Mod: CPTII,S$GLB,, | Performed by: FAMILY MEDICINE

## 2021-08-24 PROCEDURE — 99214 OFFICE O/P EST MOD 30 MIN: CPT | Mod: S$GLB,,, | Performed by: FAMILY MEDICINE

## 2021-08-24 PROCEDURE — 1125F AMNT PAIN NOTED PAIN PRSNT: CPT | Mod: CPTII,S$GLB,, | Performed by: FAMILY MEDICINE

## 2021-08-24 RX ORDER — HYDROCODONE BITARTRATE AND ACETAMINOPHEN 10; 325 MG/1; MG/1
1 TABLET ORAL EVERY 8 HOURS PRN
Qty: 90 TABLET | Refills: 0 | Status: SHIPPED | OUTPATIENT
Start: 2021-08-24 | End: 2021-09-28 | Stop reason: SDUPTHER

## 2021-08-24 RX ORDER — MONTELUKAST SODIUM 10 MG/1
10 TABLET ORAL NIGHTLY
Qty: 90 TABLET | Refills: 3 | Status: SHIPPED | OUTPATIENT
Start: 2021-08-24 | End: 2022-09-07

## 2021-09-14 DIAGNOSIS — J44.9 CHRONIC OBSTRUCTIVE PULMONARY DISEASE, UNSPECIFIED COPD TYPE: ICD-10-CM

## 2021-09-14 DIAGNOSIS — J44.9 COPD MIXED TYPE: ICD-10-CM

## 2021-09-14 RX ORDER — TIOTROPIUM BROMIDE AND OLODATEROL 3.124; 2.736 UG/1; UG/1
2 SPRAY, METERED RESPIRATORY (INHALATION) DAILY
Qty: 8 G | Refills: 5 | Status: SHIPPED | OUTPATIENT
Start: 2021-09-14 | End: 2021-09-15 | Stop reason: SDUPTHER

## 2021-09-15 ENCOUNTER — TELEPHONE (OUTPATIENT)
Dept: PULMONOLOGY | Facility: CLINIC | Age: 56
End: 2021-09-15

## 2021-09-15 DIAGNOSIS — J44.9 CHRONIC OBSTRUCTIVE PULMONARY DISEASE, UNSPECIFIED COPD TYPE: ICD-10-CM

## 2021-09-15 DIAGNOSIS — J44.9 COPD MIXED TYPE: ICD-10-CM

## 2021-09-15 RX ORDER — TIOTROPIUM BROMIDE AND OLODATEROL 3.124; 2.736 UG/1; UG/1
2 SPRAY, METERED RESPIRATORY (INHALATION) DAILY
Qty: 8 G | Refills: 5 | Status: SHIPPED | OUTPATIENT
Start: 2021-09-15 | End: 2022-10-03 | Stop reason: SDUPTHER

## 2021-09-28 ENCOUNTER — OFFICE VISIT (OUTPATIENT)
Dept: PRIMARY CARE CLINIC | Facility: CLINIC | Age: 56
End: 2021-09-28
Payer: MEDICARE

## 2021-09-28 VITALS
RESPIRATION RATE: 18 BRPM | BODY MASS INDEX: 38.04 KG/M2 | DIASTOLIC BLOOD PRESSURE: 72 MMHG | SYSTOLIC BLOOD PRESSURE: 138 MMHG | WEIGHT: 265.75 LBS | HEIGHT: 70 IN | OXYGEN SATURATION: 93 % | HEART RATE: 101 BPM

## 2021-09-28 DIAGNOSIS — J44.9 CHRONIC OBSTRUCTIVE PULMONARY DISEASE, UNSPECIFIED COPD TYPE: ICD-10-CM

## 2021-09-28 DIAGNOSIS — M51.36 DDD (DEGENERATIVE DISC DISEASE), LUMBAR: ICD-10-CM

## 2021-09-28 DIAGNOSIS — L01.02 PUSTULAR FOLLICULITIS: Primary | ICD-10-CM

## 2021-09-28 DIAGNOSIS — Z23 NEED FOR VACCINATION: ICD-10-CM

## 2021-09-28 DIAGNOSIS — E78.5 HYPERLIPIDEMIA, UNSPECIFIED HYPERLIPIDEMIA TYPE: ICD-10-CM

## 2021-09-28 DIAGNOSIS — R73.03 BORDERLINE DIABETES: ICD-10-CM

## 2021-09-28 PROCEDURE — 1159F MED LIST DOCD IN RCRD: CPT | Mod: HCNC,CPTII,S$GLB, | Performed by: FAMILY MEDICINE

## 2021-09-28 PROCEDURE — 99214 OFFICE O/P EST MOD 30 MIN: CPT | Mod: HCNC,S$GLB,, | Performed by: FAMILY MEDICINE

## 2021-09-28 PROCEDURE — 3078F PR MOST RECENT DIASTOLIC BLOOD PRESSURE < 80 MM HG: ICD-10-PCS | Mod: HCNC,CPTII,S$GLB, | Performed by: FAMILY MEDICINE

## 2021-09-28 PROCEDURE — 1160F RVW MEDS BY RX/DR IN RCRD: CPT | Mod: HCNC,CPTII,S$GLB, | Performed by: FAMILY MEDICINE

## 2021-09-28 PROCEDURE — 3078F DIAST BP <80 MM HG: CPT | Mod: HCNC,CPTII,S$GLB, | Performed by: FAMILY MEDICINE

## 2021-09-28 PROCEDURE — 90686 IIV4 VACC NO PRSV 0.5 ML IM: CPT | Mod: HCNC,S$GLB,, | Performed by: FAMILY MEDICINE

## 2021-09-28 PROCEDURE — 90686 FLU VACCINE (QUAD) GREATER THAN OR EQUAL TO 3YO PRESERVATIVE FREE IM: ICD-10-PCS | Mod: HCNC,S$GLB,, | Performed by: FAMILY MEDICINE

## 2021-09-28 PROCEDURE — 3075F PR MOST RECENT SYSTOLIC BLOOD PRESS GE 130-139MM HG: ICD-10-PCS | Mod: HCNC,CPTII,S$GLB, | Performed by: FAMILY MEDICINE

## 2021-09-28 PROCEDURE — 3008F PR BODY MASS INDEX (BMI) DOCUMENTED: ICD-10-PCS | Mod: HCNC,CPTII,S$GLB, | Performed by: FAMILY MEDICINE

## 2021-09-28 PROCEDURE — 1160F PR REVIEW ALL MEDS BY PRESCRIBER/CLIN PHARMACIST DOCUMENTED: ICD-10-PCS | Mod: HCNC,CPTII,S$GLB, | Performed by: FAMILY MEDICINE

## 2021-09-28 PROCEDURE — 3075F SYST BP GE 130 - 139MM HG: CPT | Mod: HCNC,CPTII,S$GLB, | Performed by: FAMILY MEDICINE

## 2021-09-28 PROCEDURE — 99214 PR OFFICE/OUTPT VISIT, EST, LEVL IV, 30-39 MIN: ICD-10-PCS | Mod: HCNC,S$GLB,, | Performed by: FAMILY MEDICINE

## 2021-09-28 PROCEDURE — 99999 PR PBB SHADOW E&M-EST. PATIENT-LVL V: CPT | Mod: PBBFAC,HCNC,, | Performed by: FAMILY MEDICINE

## 2021-09-28 PROCEDURE — 99999 PR PBB SHADOW E&M-EST. PATIENT-LVL V: ICD-10-PCS | Mod: PBBFAC,HCNC,, | Performed by: FAMILY MEDICINE

## 2021-09-28 PROCEDURE — 1159F PR MEDICATION LIST DOCUMENTED IN MEDICAL RECORD: ICD-10-PCS | Mod: HCNC,CPTII,S$GLB, | Performed by: FAMILY MEDICINE

## 2021-09-28 PROCEDURE — G0008 FLU VACCINE (QUAD) GREATER THAN OR EQUAL TO 3YO PRESERVATIVE FREE IM: ICD-10-PCS | Mod: HCNC,S$GLB,, | Performed by: FAMILY MEDICINE

## 2021-09-28 PROCEDURE — G0008 ADMIN INFLUENZA VIRUS VAC: HCPCS | Mod: HCNC,S$GLB,, | Performed by: FAMILY MEDICINE

## 2021-09-28 PROCEDURE — 3008F BODY MASS INDEX DOCD: CPT | Mod: HCNC,CPTII,S$GLB, | Performed by: FAMILY MEDICINE

## 2021-09-28 RX ORDER — MUPIROCIN 20 MG/G
OINTMENT TOPICAL 3 TIMES DAILY
Qty: 22 G | Refills: 1 | Status: SHIPPED | OUTPATIENT
Start: 2021-09-28 | End: 2023-01-30 | Stop reason: SDUPTHER

## 2021-09-28 RX ORDER — HYDROCODONE BITARTRATE AND ACETAMINOPHEN 10; 325 MG/1; MG/1
1 TABLET ORAL EVERY 8 HOURS PRN
Qty: 90 TABLET | Refills: 0 | Status: SHIPPED | OUTPATIENT
Start: 2021-09-28 | End: 2022-04-22 | Stop reason: SDUPTHER

## 2021-09-28 RX ORDER — CHLORHEXIDINE GLUCONATE 40 MG/ML
SOLUTION TOPICAL
Qty: 118 ML | Refills: 3 | Status: SHIPPED | OUTPATIENT
Start: 2021-09-28 | End: 2022-06-23

## 2022-01-06 NOTE — TELEPHONE ENCOUNTER
No new care gaps identified.  Powered by "[employer]" by [employer]. Reference number: [id number].   1/06/2022 12:28:48 PM CST

## 2022-01-06 NOTE — TELEPHONE ENCOUNTER
No new care gaps identified.  Powered by [employer] by [employer]. Reference number: [id number].   1/06/2022 12:29:18 PM CST

## 2022-01-07 RX ORDER — ALLOPURINOL 100 MG/1
TABLET ORAL
Qty: 90 TABLET | Refills: 1 | Status: SHIPPED | OUTPATIENT
Start: 2022-01-07 | End: 2022-09-06 | Stop reason: SDUPTHER

## 2022-01-09 RX ORDER — ROSUVASTATIN CALCIUM 5 MG/1
TABLET, COATED ORAL
Qty: 90 TABLET | Refills: 3 | Status: SHIPPED | OUTPATIENT
Start: 2022-01-09 | End: 2023-03-08

## 2022-01-09 NOTE — TELEPHONE ENCOUNTER
Refill Routing Note   Medication(s) are not appropriate for processing by [facility] for the following reason(s):      - Allergy/Contraindication (rosuvastatin)    [facility] action(s):  Defer       Medication Therapy Plan: Allergy/Contraindication: rosuvastatin; Defer to PCP  --->Care Gap information included in message below if applicable.   Medication reconciliation completed: No   Automatic Epic Generated Protocol Data:        Requested Prescriptions   Pending Prescriptions Disp Refills    rosuvastatin (CRESTOR) 5 MG tablet [Pharmacy Med Name: ROSUVASTATIN CALCIUM 5 MG Tablet] 90 tablet 2     Sig: TAKE 1 TABLET EVERY DAY       Cardiovascular:  Antilipid - Statins Passed - 1/6/2022 12:28 PM        Passed - Patient is at least 18 years old        Passed - Valid encounter within last 15 months     Recent Visits  Date Type Provider Dept   09/28/21 Office Visit Otto Bruce MD [facility]   08/24/21 Office Visit Otto Bruce MD [facility]   07/20/21 Office Visit Otto Bruce MD [facility]   04/16/21 Office Visit Otto Bruce MD [facility]   12/09/20 Office Visit Otto Bruce MD [facility]   08/14/20 Office Visit Otto Bruce MD [facility]   07/29/20 Office Visit Otto Bruce MD [facility]   Showing recent visits within past 720 days and meeting all other requirements  Future Appointments  No visits were found meeting these conditions.  Showing future appointments within next 150 days and meeting all other requirements      Future Appointments              In 2 months Otto Bruce MD [facility] [address]                Passed - ALT is 131 or below and within 360 days     ALT   Date Value Ref Range Status   09/28/2021 35 10 - 44 U/L Final   12/17/2020 50 17 - 63 U/L Final   07/29/2020 52 17 - 63 U/L Final              Passed - AST is 119 or  below and within 360 days     AST   Date Value Ref Range Status   09/28/2021 56 (H) 10 - 40 U/L Final   12/17/2020 85 (H) 15 - 41 U/L Final   07/29/2020 75 (H) 15 - 41 U/L Final              Passed - Total Cholesterol within 360 days     Lab Results   Component Value Date    CHOL 260 (H) 09/28/2021    CHOL 238 (H) 12/30/2019    CHOL 192 08/30/2019              Passed - LDL within 360 days     LDL Cholesterol   Date Value Ref Range Status   09/28/2021 138.6 63.0 - 159.0 mg/dL Final     Comment:     The National Cholesterol Education Program (NCEP) has set the  following guidelines (reference values) for LDL Cholesterol:  Optimal.......................<130 mg/dL  Borderline High...............130-159 mg/dL  High..........................160-189 mg/dL  Very High.....................>190 mg/dL              Passed - HDL within 360 days     HDL   Date Value Ref Range Status   09/28/2021 105 (H) 40 - 75 mg/dL Final     Comment:     The National Cholesterol Education Program (NCEP) has set the  following guidelines (reference values) for HDL Cholesterol:  Low...............<40 mg/dL  Optimal...........>60 mg/dL              Passed - Triglycerides within 360 days     Lab Results   Component Value Date    TRIG 82 09/28/2021    TRIG 91 12/30/2019    TRIG 94 08/30/2019                    Appointments  past 12m or future 3m with PCP    Date Provider   Last Visit   9/28/2021 Otto Bruce MD   Next Visit   3/29/2022 Otto Bruce MD   ED visits in past 90 days: 0        Note composed:12:24 PM 01/09/2022

## 2022-03-09 DIAGNOSIS — R73.03 BORDERLINE DIABETES: ICD-10-CM

## 2022-03-10 ENCOUNTER — TELEPHONE (OUTPATIENT)
Dept: PULMONOLOGY | Facility: CLINIC | Age: 57
End: 2022-03-10
Payer: MEDICARE

## 2022-03-10 NOTE — TELEPHONE ENCOUNTER
Care Due:                  Date            Visit Type   Department     Provider  --------------------------------------------------------------------------------                                 -                              PRIMARY [facility]  Last Visit: 09-      CARE ([facility])   PRIMARY CARE   Otto Bruce                              EP - PRIMARY [facility]  Next Visit: 03-      CARE ([facility])   PRIMARY CARE   Otto Bruce                                                            Last  Test          Frequency    Reason                     Performed    Due Date  --------------------------------------------------------------------------------    HBA1C.......  6 months...  metFORMIN................  09- 03-    Uric Acid...  12 months..  allopurinoL..............  Not Found    Overdue    Powered by [employer]. Reference number: [id number].   3/09/2022 7:05:51 PM CST

## 2022-03-10 NOTE — TELEPHONE ENCOUNTER
Left voicemail , also gave pt medical record number.   ----- Message from Malia Alexander sent at 3/10/2022  3:52 PM CST -----  Contact: pt  Type: Needs Medical Advice    Who Called: pt  Best Call Back Number: [phone]  Inquiry/Question: pt requesting some paperwork needed for court.  Please contact pt at your convenience.  Thank you~

## 2022-03-17 NOTE — TELEPHONE ENCOUNTER
Refill Routing Note   Medication(s) are not appropriate for processing by [facility] for the following reason(s):      - Drug-Disease Interaction (metFORMIN and Chronic respiratory failure: traZODone/bumetanide and Hypokalemia)    [facility] action(s):  Defer Medication-related problems identified: Drug-disease interaction        --->Care Gap information included in message below if applicable.   Medication reconciliation completed: No   Automatic Epic Generated Protocol Data:        Requested Prescriptions   Pending Prescriptions Disp Refills    metFORMIN (GLUCOPHAGE-XR) 500 MG ER 24hr tablet [Pharmacy Med Name: METFORMIN HYDROCHLORIDE  MG Tablet Extended Release 24 Hour] 90 tablet 0     Sig: TAKE 1 TABLET EVERY MORNING  WITH  BREAKFAST       Endocrinology:  Diabetes - Biguanides Passed - 3/9/2022  7:05 PM        Passed - Patient is at least 18 years old        Passed - Valid encounter within last 15 months     Recent Visits  Date Type Provider Dept   09/28/21 Office Visit [facility]   08/24/21 Office Visit Otto Bruce MD [facility]   07/20/21 Office Visit Otto Bruce MD [facility]   04/16/21 Office Visit Otto Bruce MD [facility]   12/09/20 Office Visit Otto Bruce MD [facility]   08/14/20 Office Visit Otto Bruce MD [facility]   07/29/20 Office Visit Otto Bruce MD [facility]   Showing recent visits within past 720 days and meeting all other requirements  Future Appointments  No visits were found meeting these conditions.  Showing future appointments within next 150 days and meeting all other requirements      Future Appointments              In 1 week Otto Bruce MD [facility] [address]                Passed - Cr is 1.39 or below and within 360 days     Lab Results   Component Value Date    CREATININE 0.9  09/28/2021    CREATININE 0.9 12/17/2020    CREATININE 0.9 07/29/2020              Passed - HBA1C within 180 days     Lab Results   Component Value Date    LABA1C 5.8 08/06/2020    HGBA1C 6.2 (H) 09/28/2021    HGBA1C 6.1 (H) 07/29/2020    HGBA1C 5.8 (H) 02/11/2019              Passed - eGFR is 45 or above and within 360 days     Lab Results   Component Value Date    EGFRNONAA >60.0 09/28/2021    EGFRNONAA >60.0 12/17/2020    EGFRNONAA >60.0 07/29/2020                  traZODone (DESYREL) 100 MG tablet [Pharmacy Med Name: TRAZODONE HYDROCHLORIDE 100 MG Tablet] 90 tablet 2     Sig: TAKE 1 TABLET AT BEDTIME       Psychiatry: Antidepressants - Serotonin Modulator Passed - 3/9/2022  7:05 PM        Passed - Patient is at least 18 years old        Passed - Valid encounter within last 15 months     Recent Visits  Date Type Provider Dept   09/28/21 Office Visit [facility] Bruce MD [facility]   08/24/21 Office Visit Otto Bruce MD [facility]   07/20/21 Office Visit Otto Bruce MD [facility]   04/16/21 Office Visit Otto Bruce MD [facility]   12/09/20 Office Visit Otto Bruce MD [facility]   08/14/20 Office Visit Otto Bruce MD [facility]   07/29/20 Office Visit Otto Bruce MD [facility]   Showing recent visits within past 720 days and meeting all other requirements  Future Appointments  No visits were found meeting these conditions.  Showing future appointments within next 150 days and meeting all other requirements      Future Appointments              In 1 week Otto Bruce MD [facility] [address]                  bumetanide (BUMEX) 2 MG tablet [Pharmacy Med Name: BUMETANIDE 2 MG Tablet] 90 tablet 2     Sig: TAKE 1 TABLET EVERY DAY       Cardiovascular:  Diuretics - Loop Passed - 3/9/2022  7:05 PM        Passed - Patient is at least 18 years old         Passed - Last BP in normal range within 360 days     BP Readings from Last 3 Encounters:   09/28/21 138/72   08/24/21 130/78   07/20/21 136/64                 Passed - Valid encounter within last 15 months     Recent Visits  Date Type Provider Dept   09/28/21 Office Visit [facility] MD [facility]   08/24/21 Office Visit Otto Bruce MD [facility]   07/20/21 Office Visit Otto Bruce MD [facility]   04/16/21 Office Visit Otto Bruce MD [facility]   12/09/20 Office Visit Otto Bruce MD [facility]   08/14/20 Office Visit Otto Bruce MD [facility]   07/29/20 Office Visit Otto Bruce MD [facility]   Showing recent visits within past 720 days and meeting all other requirements  Future Appointments  No visits were found meeting these conditions.  Showing future appointments within next 150 days and meeting all other requirements      Future Appointments              In 1 week Otto Bruce MD [facility] [address]                Passed - K in normal range and within 360 days     Potassium   Date Value Ref Range Status   09/28/2021 3.9 3.5 - 5.1 mmol/L Final   12/17/2020 3.7 3.5 - 5.1 mmol/L Final   07/29/2020 3.7 3.5 - 5.1 mmol/L Final              Passed - Na is between 130 and 148 and within 360 days     Sodium   Date Value Ref Range Status   09/28/2021 142 136 - 145 mmol/L Final   12/17/2020 139 136 - 145 mmol/L Final   07/29/2020 139 136 - 145 mmol/L Final              Passed - Cr is 1.39 or below and within 360 days     Lab Results   Component Value Date    CREATININE 0.9 09/28/2021    CREATININE 0.9 12/17/2020    CREATININE 0.9 07/29/2020              Passed - eGFR within 360 days     Lab Results   Component Value Date    EGFRNONAA >60.0 09/28/2021    EGFRNONAA >60.0 12/17/2020    EGFRNONAA >60.0 07/29/2020                      Appointments   past 12m or future 3m with PCP    Date Provider   Last Visit   9/28/2021 Otto Bruce MD   Next Visit   3/29/2022 Otto Bruce MD   ED visits in past 90 days: 0        Note composed:5:33 PM 03/17/2022

## 2022-03-18 RX ORDER — BUMETANIDE 2 MG/1
TABLET ORAL
Qty: 90 TABLET | Refills: 1 | Status: SHIPPED | OUTPATIENT
Start: 2022-03-18 | End: 2022-09-15

## 2022-03-18 RX ORDER — METFORMIN HYDROCHLORIDE 500 MG/1
TABLET, EXTENDED RELEASE ORAL
Qty: 90 TABLET | Refills: 1 | Status: SHIPPED | OUTPATIENT
Start: 2022-03-18 | End: 2022-09-15

## 2022-03-18 RX ORDER — TRAZODONE HYDROCHLORIDE 100 MG/1
TABLET ORAL
Qty: 90 TABLET | Refills: 1 | Status: SHIPPED | OUTPATIENT
Start: 2022-03-18 | End: 2022-09-15

## 2022-03-21 NOTE — TELEPHONE ENCOUNTER
Provider Staff:     Action is required for this patient.   Please see care gap opportunities below in Care Due Message.     Thanks!  [facility]     Appointments      Date Provider   Last Visit   9/28/2021 Otto Bruce MD   Next Visit   3/29/2022 Otto Bruce MD     Note composed:9:34 AM 03/21/2022

## 2022-03-31 RX ORDER — ALLOPURINOL 300 MG/1
TABLET ORAL
Qty: 90 TABLET | Refills: 3 | Status: SHIPPED | OUTPATIENT
Start: 2022-03-31 | End: 2023-03-08

## 2022-03-31 NOTE — TELEPHONE ENCOUNTER
This Rx Request does not qualify for assessment with the OR   Please review protocol details and the Care Due Message for extra clinical information    Reasons Rx Request may be deferred:  Labs/Vitals overdue    Note composed:1:24 PM 03/31/2022

## 2022-03-31 NOTE — TELEPHONE ENCOUNTER
No new care gaps identified.  Powered by [employer]. Reference number: [id number].   3/30/2022 7:16:39 PM CDT

## 2022-04-22 ENCOUNTER — OFFICE VISIT (OUTPATIENT)
Dept: PRIMARY CARE CLINIC | Facility: CLINIC | Age: 57
End: 2022-04-22
Payer: MEDICARE

## 2022-04-22 VITALS
WEIGHT: 267.88 LBS | HEART RATE: 83 BPM | RESPIRATION RATE: 18 BRPM | SYSTOLIC BLOOD PRESSURE: 124 MMHG | BODY MASS INDEX: 38.35 KG/M2 | DIASTOLIC BLOOD PRESSURE: 64 MMHG | OXYGEN SATURATION: 95 % | HEIGHT: 70 IN

## 2022-04-22 DIAGNOSIS — R60.0 BILATERAL LEG EDEMA: Primary | ICD-10-CM

## 2022-04-22 DIAGNOSIS — J44.9 CHRONIC OBSTRUCTIVE PULMONARY DISEASE, UNSPECIFIED COPD TYPE: ICD-10-CM

## 2022-04-22 DIAGNOSIS — E87.6 HYPOKALEMIA: ICD-10-CM

## 2022-04-22 DIAGNOSIS — E66.01 SEVERE OBESITY (BMI 35.0-39.9) WITH COMORBIDITY: ICD-10-CM

## 2022-04-22 DIAGNOSIS — R73.03 BORDERLINE DIABETES: ICD-10-CM

## 2022-04-22 DIAGNOSIS — M51.36 DDD (DEGENERATIVE DISC DISEASE), LUMBAR: ICD-10-CM

## 2022-04-22 PROCEDURE — 99999 PR PBB SHADOW E&M-EST. PATIENT-LVL III: ICD-10-PCS | Mod: PBBFAC,,, | Performed by: FAMILY MEDICINE

## 2022-04-22 PROCEDURE — 3008F BODY MASS INDEX DOCD: CPT | Mod: CPTII,S$GLB,, | Performed by: FAMILY MEDICINE

## 2022-04-22 PROCEDURE — 3074F SYST BP LT 130 MM HG: CPT | Mod: CPTII,S$GLB,, | Performed by: FAMILY MEDICINE

## 2022-04-22 PROCEDURE — 99999 PR PBB SHADOW E&M-EST. PATIENT-LVL III: CPT | Mod: PBBFAC,,, | Performed by: FAMILY MEDICINE

## 2022-04-22 PROCEDURE — 3008F PR BODY MASS INDEX (BMI) DOCUMENTED: ICD-10-PCS | Mod: CPTII,S$GLB,, | Performed by: FAMILY MEDICINE

## 2022-04-22 PROCEDURE — 3074F PR MOST RECENT SYSTOLIC BLOOD PRESSURE < 130 MM HG: ICD-10-PCS | Mod: CPTII,S$GLB,, | Performed by: FAMILY MEDICINE

## 2022-04-22 PROCEDURE — 3078F PR MOST RECENT DIASTOLIC BLOOD PRESSURE < 80 MM HG: ICD-10-PCS | Mod: CPTII,S$GLB,, | Performed by: FAMILY MEDICINE

## 2022-04-22 PROCEDURE — 99214 PR OFFICE/OUTPT VISIT, EST, LEVL IV, 30-39 MIN: ICD-10-PCS | Mod: S$GLB,,, | Performed by: FAMILY MEDICINE

## 2022-04-22 PROCEDURE — 3078F DIAST BP <80 MM HG: CPT | Mod: CPTII,S$GLB,, | Performed by: FAMILY MEDICINE

## 2022-04-22 PROCEDURE — 99214 OFFICE O/P EST MOD 30 MIN: CPT | Mod: S$GLB,,, | Performed by: FAMILY MEDICINE

## 2022-04-22 RX ORDER — SPIRONOLACTONE 25 MG/1
25 TABLET ORAL DAILY
Qty: 90 TABLET | Refills: 1 | Status: SHIPPED | OUTPATIENT
Start: 2022-04-22 | End: 2022-09-08

## 2022-04-22 RX ORDER — HYDROCODONE BITARTRATE AND ACETAMINOPHEN 10; 325 MG/1; MG/1
1 TABLET ORAL EVERY 8 HOURS PRN
Qty: 90 TABLET | Refills: 0 | Status: SHIPPED | OUTPATIENT
Start: 2022-04-22 | End: 2022-11-15 | Stop reason: SDUPTHER

## 2022-04-22 NOTE — PROGRESS NOTES
"Subjective:       Patient ID: Mina Zelaya is a 56 y.o. male.    Chief Complaint: Follow-up    Chronic dyspnea, uchanged  Chronic BLE edema, says Bumex not helping as much as prior, used to take another diuretic. Denies CP or palpitations  Chronic low back pain - requesting refill of pain meds. No recent fall or injury    Review of Systems   Constitutional: Negative for fever.   Respiratory: Positive for shortness of breath.    Cardiovascular: Positive for leg swelling. Negative for chest pain and palpitations.   Musculoskeletal: Positive for back pain.   Neurological: Negative for dizziness.   Psychiatric/Behavioral: Negative for agitation and confusion.       Objective:      Vitals:    22   BP: 124/64   BP Location: Right arm   Patient Position: Sitting   BP Method: Large (Manual)   Pulse: 83   Resp: 18   SpO2: 95%   Weight: 121.5 kg (267 lb 13.7 oz)   Height: 5' 10" (1.778 m)     Physical Exam  Vitals and nursing note reviewed.   Constitutional:       Appearance: He is well-developed. He is obese.   HENT:      Head: Normocephalic and atraumatic.   Cardiovascular:      Rate and Rhythm: Normal rate and regular rhythm.      Heart sounds: Normal heart sounds.   Pulmonary:      Effort: Pulmonary effort is normal.      Breath sounds: Normal breath sounds.   Musculoskeletal:      Right lower le+ Pitting Edema present.      Left lower le+ Pitting Edema present.   Skin:     General: Skin is warm and dry.   Neurological:      Mental Status: He is alert and oriented to person, place, and time.         Lab Results   Component Value Date    WBC 5.00 2021    HGB 14.1 2021    HCT 42.0 2021     2021    CHOL 260 (H) 2021    TRIG 82 2021     (H) 2021    ALT 35 2021    AST 56 (H) 2021     2021    K 3.9 2021     2021    CREATININE 0.9 2021    BUN 12 2021    CO2 27 2021    TSH 1.31 2018    PSA " 1.1 09/28/2021    INR 1.0 07/14/2020    HGBA1C 6.2 (H) 09/28/2021      Assessment:       1. Bilateral leg edema    2. Severe obesity (BMI 35.0-39.9) with comorbidity    3. Chronic obstructive pulmonary disease, unspecified COPD type    4. Borderline diabetes    5. DDD (degenerative disc disease), lumbar    6. Hypokalemia        Plan:       Bilateral leg edema  -     spironolactone (ALDACTONE) 25 MG tablet; Take 1 tablet (25 mg total) by mouth once daily.  Dispense: 90 tablet; Refill: 1  Add low-dose spironolactone, check labs in 3-4 weeks  Severe obesity (BMI 35.0-39.9) with comorbidity  Low carb diet  Chronic obstructive pulmonary disease, unspecified COPD type  stable  Borderline diabetes  Low carb diet  DDD (degenerative disc disease), lumbar  -     HYDROcodone-acetaminophen (NORCO)  mg per tablet; Take 1 tablet by mouth every 8 (eight) hours as needed for Pain.  Dispense: 90 tablet; Refill: 0   reviewed, no worrisome findings  Hypokalemia  -     Renal Function Panel; Future; Expected date: 05/22/2022      Medication List with Changes/Refills   New Medications    SPIRONOLACTONE (ALDACTONE) 25 MG TABLET    Take 1 tablet (25 mg total) by mouth once daily.   Current Medications    ALBUTEROL-IPRATROPIUM (DUO-NEB) 2.5 MG-0.5 MG/3 ML NEBULIZER SOLUTION    Take 3 mLs by nebulization every 6 (six) hours as needed for Wheezing or Shortness of Breath. Rescue    ALLOPURINOL (ZYLOPRIM) 100 MG TABLET    TAKE 1 TABLET EVERY DAY    ALLOPURINOL (ZYLOPRIM) 300 MG TABLET    TAKE 1 TABLET EVERY DAY    ASCORBIC ACID, VITAMIN C, (VITAMIN C) 1000 MG TABLET    Take 1,000 mg by mouth once daily.    ASPIRIN (ECOTRIN) 81 MG EC TABLET    Take 81 mg by mouth once daily.    BUMETANIDE (BUMEX) 2 MG TABLET    TAKE 1 TABLET EVERY DAY    CALCIUM CARBONATE (OS-VÍCTOR) 600 MG (1,500 MG) TAB    Take 600 mg by mouth once daily.    CHLORHEXIDINE (HIBICLENS) 4 % EXTERNAL LIQUID    Wash daily x 3 days, then 3 days per week for 2 weeks, repeat as  needed    DIAZEPAM (VALIUM) 10 MG TAB    TAKE 1 TABLET BY MOUTH DAILY AS NEEDED FOR ANXIETY    FISH OIL-OMEGA-3 FATTY ACIDS 300-1,000 MG CAPSULE    Take 1,200 mg by mouth once daily.     MG TABLET    TAKE 1 TABLET THREE TIMES DAILY    LORATADINE (CLARITIN) 10 MG TABLET    Take 10 mg by mouth once daily.    METFORMIN (GLUCOPHAGE-XR) 500 MG ER 24HR TABLET    TAKE 1 TABLET EVERY MORNING  WITH  BREAKFAST    MONTELUKAST (SINGULAIR) 10 MG TABLET    Take 1 tablet (10 mg total) by mouth every evening.    MUPIROCIN (BACTROBAN) 2 % OINTMENT    Apply topically 3 (three) times daily.    POTASSIUM CHLORIDE SA (K-DUR,KLOR-CON) 20 MEQ TABLET    TAKE 2 TABLETS TWICE DAILY    ROSUVASTATIN (CRESTOR) 5 MG TABLET    TAKE 1 TABLET EVERY DAY    TIOTROPIUM-OLODATEROL (STIOLTO RESPIMAT) 2.5-2.5 MCG/ACTUATION MIST    Inhale 2 puffs into the lungs once daily.    TOFACITINIB (XELJANZ XR) 11 MG TB24    Take 11 mg by mouth once daily.    TRAZODONE (DESYREL) 100 MG TABLET    TAKE 1 TABLET AT BEDTIME    VENTOLIN HFA 90 MCG/ACTUATION INHALER    Inhale 1 puff into the lungs 4 (four) times daily as needed.   Changed and/or Refilled Medications    Modified Medication Previous Medication    HYDROCODONE-ACETAMINOPHEN (NORCO)  MG PER TABLET HYDROcodone-acetaminophen (NORCO)  mg per tablet       Take 1 tablet by mouth every 8 (eight) hours as needed for Pain.    Take 1 tablet by mouth every 8 (eight) hours as needed for Pain.

## 2022-06-03 ENCOUNTER — TELEPHONE (OUTPATIENT)
Dept: PULMONOLOGY | Facility: CLINIC | Age: 57
End: 2022-06-03
Payer: MEDICARE

## 2022-06-03 NOTE — TELEPHONE ENCOUNTER
"Requested Prescriptions   Pending Prescriptions Disp Refills     albuterol (PROAIR HFA/PROVENTIL HFA/VENTOLIN HFA) 108 (90 Base) MCG/ACT inhaler [Pharmacy Med Name: ALBUTEROL HFA INH (200 PUFFS) 18GM]  Last Written Prescription Date:  2/6/2020  Last Fill Quantity: 18 g,  # refills: 0   Last office visit: 2/20/2020 with prescribing provider:  Steven   Future Office Visit:   18 g 0     Sig: INHALE 2 PUFFS BY MOUTH FOUR TIMES DAILY AS NEEDED       Asthma Maintenance Inhalers - Anticholinergics Passed - 3/23/2020  9:29 AM        Passed - Patient is age 12 years or older        Passed - Recent (12 mo) or future (30 days) visit within the authorizing provider's specialty     Patient has had an office visit with the authorizing provider or a provider within the authorizing providers department within the previous 12 mos or has a future within next 30 days. See \"Patient Info\" tab in inbasket, or \"Choose Columns\" in Meds & Orders section of the refill encounter.              Passed - Medication is active on med list       Short-Acting Beta Agonist Inhalers Protocol  Passed - 3/23/2020  9:29 AM        Passed - Patient is age 12 or older        Passed - Recent (12 mo) or future (30 days) visit within the authorizing provider's specialty     Patient has had an office visit with the authorizing provider or a provider within the authorizing providers department within the previous 12 mos or has a future within next 30 days. See \"Patient Info\" tab in inbasket, or \"Choose Columns\" in Meds & Orders section of the refill encounter.              Passed - Medication is active on med list           tolterodine (DETROL) 2 MG tablet [Pharmacy Med Name: TOLTERODINE TARTRATE 2MG TABLETS]  Last Written Prescription Date:  12/31/2019  Last Fill Quantity: 180 tabs,  # refills: 0   Last office visit: 2/20/2020 with prescribing provider:  Steven   Future Office Visit:   180 tablet 0     Sig: TAKE 1 TABLET(2 MG) BY MOUTH TWICE DAILY       " Faxed [facility] to [place]   "Muscarinic Antagonists (Urinary Incontinence Agents) Passed - 3/23/2020  9:29 AM        Passed - Recent (12 mo) or future (30 days) visit within the authorizing provider's specialty     Patient has had an office visit with the authorizing provider or a provider within the authorizing providers department within the previous 12 mos or has a future within next 30 days. See \"Patient Info\" tab in inbasket, or \"Choose Columns\" in Meds & Orders section of the refill encounter.              Passed - Patient does not have a diagnosis of glaucoma on the problem list     If glaucoma diagnosis is new, refer refill to physician.          Passed - Medication is active on med list        Passed - Patient is 18 years of age or older            "

## 2022-06-06 ENCOUNTER — TELEPHONE (OUTPATIENT)
Dept: PRIMARY CARE CLINIC | Facility: CLINIC | Age: 57
End: 2022-06-06
Payer: MEDICARE

## 2022-06-06 NOTE — TELEPHONE ENCOUNTER
----- Message from Trinidad Bassett sent at 6/6/2022 12:17 PM CDT -----  Contact: Self/[phone]  Caller is requesting an earlier appointment then we can schedule.  Caller is requesting a message be sent to the provider.  If this is for urgent care symptoms, did you offer other providers at this location, providers at other locations, or [facility]? (yes, no, n/a):  n/a  If this is for the patients physical, did you offer to schedule next available and put on wait list, or to see NP or PA for their physical?  (yes, no, n/a):  n/a  When is the next available appointment with their provider:  7/29   Reason for the appointment:  left testicle swollen /pain  Patient preference of timeframe to be scheduled:  today   Would the patient like a call back, or a response through their [facility] portal?:   call back   Comments:

## 2022-06-06 NOTE — TELEPHONE ENCOUNTER
I spoke with the patient. He declines scheduling with another provider on Monday. Dr. Bruce is out this week and everyone else is booked. I let him know if it gets worse, turns purple, hurts, or anything too abnormal, he needs to go to the ED. Patient verbalized understanding.

## 2022-06-09 DIAGNOSIS — E87.6 HYPOKALEMIA: ICD-10-CM

## 2022-06-09 RX ORDER — POTASSIUM CHLORIDE 20 MEQ/1
TABLET, EXTENDED RELEASE ORAL
Qty: 360 TABLET | Refills: 1 | Status: SHIPPED | OUTPATIENT
Start: 2022-06-09 | End: 2023-01-31

## 2022-06-10 NOTE — TELEPHONE ENCOUNTER
Care Due:                  Date            Visit Type   Department     Provider  --------------------------------------------------------------------------------                                 -                              PRIMARY      [facility]  Last Visit: 04-      CARE ([facility])   [facility]IMARY CARE   Otto Bruce  Next Visit: None Scheduled  None         None Found                                                            Last  Test          Frequency    Reason                     Performed    Due Date  --------------------------------------------------------------------------------    HBA1C.......  6 months...  metFORMIN................  09- 03-    Uric Acid...  12 months..  allopurinoL..............  Not Found    Overdue    [employer] Embedded Care Gaps. Reference number: [id number]. 6/09/2022   7:15:05 PM T

## 2022-06-10 NOTE — TELEPHONE ENCOUNTER
Refill Authorization Note   Mina Zelaya  is requesting a refill authorization.  Brief Assessment and Rationale for Refill:  Approve    -Medication-Related Problems Identified: Requires labs  Medication Therapy Plan:       Medication Reconciliation Completed: No   Comments:     Provider Staff:     Action is required for this patient.   Please see care gap opportunities below in Care Due Message.     Thanks!  [facility]     Appointments      Date Provider   Last Visit   4/22/2022 Otto Bruce MD   Next Visit   Visit date not found Otto Bruce MD     Note composed:7:42 PM 06/09/2022           Note composed:7:42 PM 06/09/2022

## 2022-06-23 ENCOUNTER — TELEPHONE (OUTPATIENT)
Dept: PRIMARY CARE CLINIC | Facility: CLINIC | Age: 57
End: 2022-06-23
Payer: MEDICARE

## 2022-06-23 DIAGNOSIS — L01.02 PUSTULAR FOLLICULITIS: ICD-10-CM

## 2022-06-23 RX ORDER — CHLORHEXIDINE GLUCONATE 40 MG/ML
SOLUTION TOPICAL
Qty: 236 ML | Refills: 1 | Status: SHIPPED | OUTPATIENT
Start: 2022-06-23 | End: 2022-11-15 | Stop reason: SDUPTHER

## 2022-06-23 NOTE — TELEPHONE ENCOUNTER
I called the patient and let him know we already sent this in. If he is still having issues getting it, he can call back

## 2022-06-23 NOTE — TELEPHONE ENCOUNTER
----- Message from Mariasafia Carlson sent at 6/23/2022  9:47 AM CDT -----  Contact: [phone]  Requesting an RX refill or new RX.  Is this a refill or new RX: Refill 1  RX name and strength (copy/paste from chart):  chlorhexidine (HIBICLENS) 4 % external liquid  Is this a 30 day or 90 day RX:   Pharmacy name and phone # (copy/paste from chart):  [id number] - [place], [place] - [address] [facility] [address]   Phone:  [phone]  Fax:  [phone]        The doctors have asked that we provide their patients with the following 2 reminders -- prescription refills can take up to 72 hours, and a friendly reminder that in the future you can use your [facility] account to request refills:

## 2022-09-06 RX ORDER — ALLOPURINOL 100 MG/1
100 TABLET ORAL DAILY
Qty: 90 TABLET | Refills: 1 | Status: SHIPPED | OUTPATIENT
Start: 2022-09-06 | End: 2023-02-10

## 2022-09-06 NOTE — TELEPHONE ENCOUNTER
----- Message from Trinidad Bassett sent at 9/6/2022  4:16 PM CDT -----  Contact: Self/[phone]  Requesting an RX refill or new RX.  Is this a refill or new RX: New  RX name and strength :  allopurinoL (ZYLOPRIM) 100 MG tablet  Is this a 30 day or 90 day RX: 90  [facility] (Now [facility]) - [place], [place] - [address]  [address] [address]  Phone: [phone] Fax: [phone]      The doctors have asked that we provide their patients with the following 2 reminders -- prescription refills can take up to 72 hours, and a friendly reminder that in the future you can use your [facility] account to request refills: pt stated that he has been trying to get this medication for a couple of weeks

## 2022-09-06 NOTE — TELEPHONE ENCOUNTER
Care Due:                  Date            Visit Type   Department     Provider  --------------------------------------------------------------------------------                                 -                              PRIMARY      [facility]  Last Visit: 04-      CARE ([facility])   PRIMARY CARE   Otto Bruce  Next Visit: None Scheduled  None         None Found                                                            Last  Test          Frequency    Reason                     Performed    Due Date  --------------------------------------------------------------------------------    CBC.........  12 months..  allopurinoL..............  09- 09-    CMP.........  12 months..  allopurinoL, metFORMIN,    09- 09-                             rosuvastatin,                             spironolactone...........    HBA1C.......  6 months...  metFORMIN................  09- 03-    Lipid Panel.  12 months..  rosuvastatin.............  09- 09-    Uric Acid...  12 months..  allopurinoL..............  Not Found    Overdue    [employer] Embedded Care Gaps. Reference number: [id number]. 9/06/2022   4:20:32 PM CDT

## 2022-10-03 ENCOUNTER — TELEPHONE (OUTPATIENT)
Dept: PULMONOLOGY | Facility: CLINIC | Age: 57
End: 2022-10-03

## 2022-10-03 ENCOUNTER — OFFICE VISIT (OUTPATIENT)
Dept: PULMONOLOGY | Facility: CLINIC | Age: 57
End: 2022-10-03
Payer: MEDICARE

## 2022-10-03 VITALS
DIASTOLIC BLOOD PRESSURE: 65 MMHG | HEIGHT: 70 IN | WEIGHT: 261.81 LBS | OXYGEN SATURATION: 92 % | BODY MASS INDEX: 37.48 KG/M2 | SYSTOLIC BLOOD PRESSURE: 126 MMHG | HEART RATE: 56 BPM

## 2022-10-03 DIAGNOSIS — E66.01 SEVERE OBESITY (BMI 35.0-39.9) WITH COMORBIDITY: ICD-10-CM

## 2022-10-03 DIAGNOSIS — J44.9 CHRONIC OBSTRUCTIVE PULMONARY DISEASE, UNSPECIFIED COPD TYPE: ICD-10-CM

## 2022-10-03 DIAGNOSIS — J96.10 CHRONIC RESPIRATORY FAILURE, UNSPECIFIED WHETHER WITH HYPOXIA OR HYPERCAPNIA: ICD-10-CM

## 2022-10-03 DIAGNOSIS — J98.6 DIAPHRAGM PARALYSIS: Primary | ICD-10-CM

## 2022-10-03 DIAGNOSIS — J44.9 COPD MIXED TYPE: ICD-10-CM

## 2022-10-03 DIAGNOSIS — G47.33 OSA (OBSTRUCTIVE SLEEP APNEA): ICD-10-CM

## 2022-10-03 PROCEDURE — 3008F PR BODY MASS INDEX (BMI) DOCUMENTED: ICD-10-PCS | Mod: CPTII,S$GLB,, | Performed by: INTERNAL MEDICINE

## 2022-10-03 PROCEDURE — 99213 PR OFFICE/OUTPT VISIT, EST, LEVL III, 20-29 MIN: ICD-10-PCS | Mod: S$GLB,,, | Performed by: INTERNAL MEDICINE

## 2022-10-03 PROCEDURE — 1159F MED LIST DOCD IN RCRD: CPT | Mod: CPTII,S$GLB,, | Performed by: INTERNAL MEDICINE

## 2022-10-03 PROCEDURE — 3078F DIAST BP <80 MM HG: CPT | Mod: CPTII,S$GLB,, | Performed by: INTERNAL MEDICINE

## 2022-10-03 PROCEDURE — 99499 RISK ADDL DX/OHS AUDIT: ICD-10-PCS | Mod: HCNC,S$GLB,, | Performed by: INTERNAL MEDICINE

## 2022-10-03 PROCEDURE — 99999 PR PBB SHADOW E&M-EST. PATIENT-LVL IV: CPT | Mod: PBBFAC,,, | Performed by: INTERNAL MEDICINE

## 2022-10-03 PROCEDURE — 1159F PR MEDICATION LIST DOCUMENTED IN MEDICAL RECORD: ICD-10-PCS | Mod: CPTII,S$GLB,, | Performed by: INTERNAL MEDICINE

## 2022-10-03 PROCEDURE — 99999 PR PBB SHADOW E&M-EST. PATIENT-LVL IV: ICD-10-PCS | Mod: PBBFAC,,, | Performed by: INTERNAL MEDICINE

## 2022-10-03 PROCEDURE — 3074F SYST BP LT 130 MM HG: CPT | Mod: CPTII,S$GLB,, | Performed by: INTERNAL MEDICINE

## 2022-10-03 PROCEDURE — 3078F PR MOST RECENT DIASTOLIC BLOOD PRESSURE < 80 MM HG: ICD-10-PCS | Mod: CPTII,S$GLB,, | Performed by: INTERNAL MEDICINE

## 2022-10-03 PROCEDURE — 3008F BODY MASS INDEX DOCD: CPT | Mod: CPTII,S$GLB,, | Performed by: INTERNAL MEDICINE

## 2022-10-03 PROCEDURE — 99213 OFFICE O/P EST LOW 20 MIN: CPT | Mod: S$GLB,,, | Performed by: INTERNAL MEDICINE

## 2022-10-03 PROCEDURE — 3074F PR MOST RECENT SYSTOLIC BLOOD PRESSURE < 130 MM HG: ICD-10-PCS | Mod: CPTII,S$GLB,, | Performed by: INTERNAL MEDICINE

## 2022-10-03 PROCEDURE — 99499 UNLISTED E&M SERVICE: CPT | Mod: HCNC,S$GLB,, | Performed by: INTERNAL MEDICINE

## 2022-10-03 RX ORDER — ALBUTEROL SULFATE 90 UG/1
AEROSOL, METERED RESPIRATORY (INHALATION)
Qty: 18 G | Refills: 3 | Status: SHIPPED | OUTPATIENT
Start: 2022-10-03 | End: 2024-01-05 | Stop reason: SDUPTHER

## 2022-10-03 RX ORDER — TIOTROPIUM BROMIDE AND OLODATEROL 3.124; 2.736 UG/1; UG/1
2 SPRAY, METERED RESPIRATORY (INHALATION) DAILY
Qty: 4 G | Refills: 11 | Status: SHIPPED | OUTPATIENT
Start: 2022-10-03 | End: 2022-10-31

## 2022-10-03 NOTE — PROGRESS NOTES
10/3/2022    Mina Zelaya  Office Note    Chief Complaint   Patient presents with    Follow-up     Renewal NIV       HPI:  10/3/2022 had chr drip and cough, uses niv -- couldn't sleep without niv as would stop breathing.  Having severe hip problems. Limited by hips somewhat but also hip burning pain.  No prednisone nor abx, uses stiolto.  Taylor entering [employer].        3/25/2021 - uses niv machine all night and freq day -- pt had insurance change and needs paper work completed.  Pt cannot lay flat.  Got Moderna vaccine.      Patient Instructions   You are using and having great benefit from non invasive ventilator.  You had had numerous hospital stays due respiratory failure prior to using non invasive ventilator.  You have bilateral diaphragm paralysis problems prior to 2008 by your symptoms.  You need to continue this treatment.  1/22/2020- breathing so bad cannot do activity.  No channge, needs rescue meds to help but not relieve sob, uses niv nightly - use roughly 8-10 hrs daily.  Uses stiolto with good results, no abx used.      Patient Instructions   No great changes, need prompt evaluation for lung problems.    May need antibiotic.    You need stiolto    May 22, 2019- breathing difficult today, SOB worse with exertion, states recue inhaler does not help much. Worsened in past 24 hours, affected with weather change. Currently using NIV nightly. Nosebleeds resolved. NO prednisone use in past year. Cold in Nov 2018, tx antibiotics. On supplemental oxygen wearing 2-4 hours daily and all night. Currently using stiolto daily.   Discussed with patient above for education the following:    Continuation of current therapy  Continue NIV therapy for diaphragm paralysis induced respiratory failure.  Continue Stiolto daily    Cause of paralysis not clear, not likely reversable.    Plication of diaphragm is invasive surgical, may not help much, useful for one side paralysis.  Could have eval at main campus?   Follow here later?    Discussed weight loss services at [facility], if interested contact clinic for referral  Abdominal weight does effect ability to breath when bending over.    Will try new medication for 2 weeks, Trelegy 1 puff daily, if not benefit return to Stiolto daily, if you feel a benefit contact clinic and will order  2018- lives alone, sees grandson daily post school, having freq nose bleed, uses full face mask for niv.  Frustrated, marked starr.    2017- inactivity ppt weight gain, mood bad at times, daughter and grandson moved out and  Pt feeling down a bit.     Uses o2 and non invasive ventilator -- extreme air hunger with activity and supine. aspriates 2/day to 2 /week.  Uses bronchial rx.      Uses niv and o2 every night for sleep and for naps 1-2 daily.  Feels like worsening.    2016HPI: worse off advair and spiriva, irratents worsen, niv used 12/24 daily, cannot bend over and uc doing poorly due to abd pain.        The chief compliant  problem varies with instablilty at time    PFSH:  Past Medical History:   Diagnosis Date    Arthritis     Asthma     Chronic bronchitis     Emphysema of lung     Type 2 diabetes mellitus with hyperlipidemia 2018         No past surgical history on file.  Social History     Tobacco Use    Smoking status: Former     Packs/day: 1.00     Years: 15.00     Pack years: 15.00     Types: Cigarettes     Quit date: 2003     Years since quittin.3    Smokeless tobacco: Never   Substance Use Topics    Alcohol use: Yes     Alcohol/week: 25.0 standard drinks     Types: 25 Cans of beer per week    Drug use: No     Family History   Problem Relation Age of Onset    Stroke Mother     No Known Problems Father     Pneumonia Sister     Stroke Brother     No Known Problems Maternal Aunt     Stroke Paternal Aunt     Cancer Paternal Uncle     Cancer Maternal Grandmother     Heart failure Maternal Grandfather     Stroke Paternal Grandmother     No Known  "Problems Paternal Grandfather     Stroke Brother     No Known Problems Brother     No Known Problems Brother      Review of patient's allergies indicates:   Allergen Reactions    Sulfa (sulfonamide antibiotics) Rash       Performance Status:The patient's activity level is housebound activities.      Review of Systems:  a review of eleven systems covering constitutional, Eye, HEENT, Psych, Respiratory, Cardiac, GI, , Musculoskeletal, Endocrine, Dermatologic was negative except for pertinent findings as listed ABOVE and below: all good except r elbow arthritis and neck pain SOB with exertion.    Exam:Comprehensive exam done.   /65 (BP Location: Left arm, Patient Position: Sitting, BP Method: Medium (Automatic)) Comment (BP Method): long cuff  Pulse (!) 56   Ht 5' 10" (1.778 m)   Wt 118.8 kg (261 lb 12.7 oz)   SpO2 (!) 92% Comment: on room air at rest  BMI 37.56 kg/m²   Exam included Vitals as listed, and patient's appearance and affect and alertness and mood, oral exam for yeast and hygiene and pharynx lesions and Mallapatti (M) score, neck with inspection for jvd and masses and thyroid abnormalities and lymph nodes (supraclavicular and infraclavicular nodes and axillary also examined and noted if abn), chest exam included symmetry and effort and fremitus and percussion and auscultation, cardiac exam included rhythm and gallops and murmur and rubs and jvd and edema, abdominal exam for mass and hepatosplenomegaly and tenderness and hernias and bowel sounds, Musculoskeletal exam with muscle tone and posture and mobility/gait and  strength, and skin for rashes and cyanosis and pallor and turgor, extremity for clubbing.  Findings were normal except for pertinent findings listed below: affect flat  M3 and no diaphragm motion-- percussion and palpation no abd movement, good bs but shallow.    Radiographs reviewed: was not done   XR CHEST PA AND LATERAL 11/07/2018   Redemonstration of mild bilateral basal " lung stranding a chronic finding apparently.  No new pulmonary changes.    The cardiac silhouette is normal in size. The hilar and mediastinal contours are unremarkable.      Labs reviewed  Results for HEAVEN HARDIN (MRN [id number]) as of 5/22/2019 09:29   Ref. Range 2/11/2019 07:14   Sodium Latest Ref Range: 136 - 145 mmol/L 139   Potassium Latest Ref Range: 3.5 - 5.1 mmol/L 3.6   Chloride Latest Ref Range: 101 - 111 mmol/L 99 (L)   CO2 Latest Ref Range: 23 - 29 mmol/L 27   Anion Gap Latest Ref Range: 8 - 16 mmol/L 13   BUN, Bld Latest Ref Range: 6 - 20 mg/dL 17   Creatinine Latest Ref Range: 0.5 - 1.4 mg/dL 0.8   eGFR if non African American Latest Ref Range: >60 mL/min/1.73 m^2 >60.0   eGFR if African American Latest Ref Range: >60 mL/min/1.73 m^2 >60.0   Glucose Latest Ref Range: 74 - 118 mg/dL 123 (H)   Calcium Latest Ref Range: 8.6 - 10.0 mg/dL 8.8       PFT was not done    Plan:  Clinical impression is resonably certain and repeated evaluation prn +/- follow up will be needed as below.    Heaven was seen today for follow-up and shortness of breath.    Diagnoses and all orders for this visit:    Diaphragm paralysis   - Continue NIV therapy  COPD mixed type  -     montelukast (SINGULAIR) 10 mg tablet; Take 1 tablet (10 mg total) by mouth every evening.  -     albuterol-ipratropium (DUO-NEB) 2.5 mg-0.5 mg/3 mL nebulizer solution; Take 3 mLs by nebulization every 6 (six) hours as needed for Wheezing or Shortness of Breath. Rescue  -     VENTOLIN HFA 90 mcg/actuation inhaler; Inhale 1 puff into the lungs 4 (four) times daily as needed.    Moderate persistent asthma, uncomplicated  -     montelukast (SINGULAIR) 10 mg tablet; Take 1 tablet (10 mg total) by mouth every evening.  -     albuterol-ipratropium (DUO-NEB) 2.5 mg-0.5 mg/3 mL nebulizer solution; Take 3 mLs by nebulization every 6 (six) hours as needed for Wheezing or Shortness of Breath. Rescue  -     VENTOLIN HFA 90 mcg/actuation inhaler; Inhale 1 puff into  the lungs 4 (four) times daily as needed.    Chronic respiratory failure with hypoxia   - continue NIV therapy  Chronic sinus complaints  -     montelukast (SINGULAIR) 10 mg tablet; Take 1 tablet (10 mg total) by mouth every evening.    Chronic obstructive pulmonary disease, unspecified COPD type  -     albuterol-ipratropium (DUO-NEB) 2.5 mg-0.5 mg/3 mL nebulizer solution; Take 3 mLs by nebulization every 6 (six) hours as needed for Wheezing or Shortness of Breath. Rescue    Class 2 obesity with alveolar hypoventilation, serious comorbidity, and body mass index (BMI) of 37.0 to 37.9 in adult   - discussed [facility] weight loss services      No follow-ups on file.    Discussed with patient above for education the following:         Mina was seen today for follow-up.    Diagnoses and all orders for this visit:    Diaphragm paralysis  -     Six Minute Walk Test to qualify for Home Oxygen; Future    Chronic respiratory failure, unspecified whether with hypoxia or hypercapnia  -     Six Minute Walk Test to qualify for Home Oxygen; Future    Chronic obstructive pulmonary disease, unspecified COPD type  -     tiotropium-olodateroL (STIOLTO RESPIMAT) 2.5-2.5 mcg/actuation Mist; Inhale 2 puffs into the lungs once daily.    MARGARITO (obstructive sleep apnea)  -     albuterol (PROVENTIL/VENTOLIN HFA) 90 mcg/actuation inhaler; 2 puffs every 4 hours as needed for cough, wheeze, or shortness of breath    COPD mixed type  -     tiotropium-olodateroL (STIOLTO RESPIMAT) 2.5-2.5 mcg/actuation Mist; Inhale 2 puffs into the lungs once daily.    Severe obesity (BMI 35.0-39.9) with comorbidity        No follow-ups on file.    Discussed with patient above for education the following:      Patient Instructions   You are using and benefiting from non invasive ventilator.      Would recommend considering Ozempic for diabetes as my facilitate weight loss.  Weight loss will help respiratory failure and hip problems.    Hip therapy may help mobility.      Would recommend six min walk and portable oxygen concentrator.     Letter done for dental work    We briefly discuss weight loss surgery.

## 2022-10-03 NOTE — LETTER
10/3/2022      To whom it may concern:    Re: Mina Zelaya,  1965        Mr Enriquez has bilateral diaphragm paralysis with chronic respiratory failure, and is unable to breath in supine position without use of his non invasive ventilator.  He would be best for him to stay head elevated 45 degrees or more-- he may be able to tolerate less head elevation for brief periods.          Thank You,          Ruben Leiva MD  Pulmonary Diseases

## 2022-10-03 NOTE — PATIENT INSTRUCTIONS
You are using and benefiting from non invasive ventilator.      Would recommend considering Ozempic for diabetes as my facilitate weight loss.  Weight loss will help respiratory failure and hip problems.    Hip therapy may help mobility.     Would recommend six min walk and portable oxygen concentrator.     Letter done for dental work    We briefly discuss weight loss surgery.

## 2022-11-15 ENCOUNTER — OFFICE VISIT (OUTPATIENT)
Dept: PRIMARY CARE CLINIC | Facility: CLINIC | Age: 57
End: 2022-11-15
Payer: MEDICARE

## 2022-11-15 VITALS
HEIGHT: 70 IN | DIASTOLIC BLOOD PRESSURE: 68 MMHG | BODY MASS INDEX: 36.85 KG/M2 | TEMPERATURE: 98 F | RESPIRATION RATE: 18 BRPM | HEART RATE: 85 BPM | OXYGEN SATURATION: 96 % | SYSTOLIC BLOOD PRESSURE: 124 MMHG | WEIGHT: 257.38 LBS

## 2022-11-15 DIAGNOSIS — N62 GYNECOMASTIA, MALE: Primary | ICD-10-CM

## 2022-11-15 DIAGNOSIS — L01.02 PUSTULAR FOLLICULITIS: ICD-10-CM

## 2022-11-15 DIAGNOSIS — E78.5 HYPERLIPIDEMIA, UNSPECIFIED HYPERLIPIDEMIA TYPE: ICD-10-CM

## 2022-11-15 DIAGNOSIS — Z12.5 PROSTATE CANCER SCREENING: ICD-10-CM

## 2022-11-15 DIAGNOSIS — J44.9 COPD MIXED TYPE: ICD-10-CM

## 2022-11-15 DIAGNOSIS — Z23 NEED FOR VACCINATION: ICD-10-CM

## 2022-11-15 DIAGNOSIS — J96.11 CHRONIC RESPIRATORY FAILURE WITH HYPOXIA: ICD-10-CM

## 2022-11-15 DIAGNOSIS — R73.03 BORDERLINE DIABETES: ICD-10-CM

## 2022-11-15 DIAGNOSIS — M51.36 DDD (DEGENERATIVE DISC DISEASE), LUMBAR: ICD-10-CM

## 2022-11-15 PROBLEM — F10.90 HEAVY ALCOHOL CONSUMPTION: Status: RESOLVED | Noted: 2018-10-01 | Resolved: 2022-11-15

## 2022-11-15 PROCEDURE — 90686 FLU VACCINE (QUAD) GREATER THAN OR EQUAL TO 3YO PRESERVATIVE FREE IM: ICD-10-PCS | Mod: S$GLB,,, | Performed by: FAMILY MEDICINE

## 2022-11-15 PROCEDURE — G0008 FLU VACCINE (QUAD) GREATER THAN OR EQUAL TO 3YO PRESERVATIVE FREE IM: ICD-10-PCS | Mod: S$GLB,,, | Performed by: FAMILY MEDICINE

## 2022-11-15 PROCEDURE — 3074F PR MOST RECENT SYSTOLIC BLOOD PRESSURE < 130 MM HG: ICD-10-PCS | Mod: CPTII,S$GLB,, | Performed by: FAMILY MEDICINE

## 2022-11-15 PROCEDURE — G0008 ADMIN INFLUENZA VIRUS VAC: HCPCS | Mod: S$GLB,,, | Performed by: FAMILY MEDICINE

## 2022-11-15 PROCEDURE — 1159F MED LIST DOCD IN RCRD: CPT | Mod: CPTII,S$GLB,, | Performed by: FAMILY MEDICINE

## 2022-11-15 PROCEDURE — 99999 PR PBB SHADOW E&M-EST. PATIENT-LVL V: CPT | Mod: PBBFAC,,, | Performed by: FAMILY MEDICINE

## 2022-11-15 PROCEDURE — G0009 PNEUMOCOCCAL CONJUGATE VACCINE 20-VALENT: ICD-10-PCS | Mod: S$GLB,,, | Performed by: FAMILY MEDICINE

## 2022-11-15 PROCEDURE — 1160F RVW MEDS BY RX/DR IN RCRD: CPT | Mod: CPTII,S$GLB,, | Performed by: FAMILY MEDICINE

## 2022-11-15 PROCEDURE — 3074F SYST BP LT 130 MM HG: CPT | Mod: CPTII,S$GLB,, | Performed by: FAMILY MEDICINE

## 2022-11-15 PROCEDURE — 3008F BODY MASS INDEX DOCD: CPT | Mod: CPTII,S$GLB,, | Performed by: FAMILY MEDICINE

## 2022-11-15 PROCEDURE — 99999 PR PBB SHADOW E&M-EST. PATIENT-LVL V: ICD-10-PCS | Mod: PBBFAC,,, | Performed by: FAMILY MEDICINE

## 2022-11-15 PROCEDURE — 3078F DIAST BP <80 MM HG: CPT | Mod: CPTII,S$GLB,, | Performed by: FAMILY MEDICINE

## 2022-11-15 PROCEDURE — 1159F PR MEDICATION LIST DOCUMENTED IN MEDICAL RECORD: ICD-10-PCS | Mod: CPTII,S$GLB,, | Performed by: FAMILY MEDICINE

## 2022-11-15 PROCEDURE — 1160F PR REVIEW ALL MEDS BY PRESCRIBER/CLIN PHARMACIST DOCUMENTED: ICD-10-PCS | Mod: CPTII,S$GLB,, | Performed by: FAMILY MEDICINE

## 2022-11-15 PROCEDURE — 3008F PR BODY MASS INDEX (BMI) DOCUMENTED: ICD-10-PCS | Mod: CPTII,S$GLB,, | Performed by: FAMILY MEDICINE

## 2022-11-15 PROCEDURE — 3078F PR MOST RECENT DIASTOLIC BLOOD PRESSURE < 80 MM HG: ICD-10-PCS | Mod: CPTII,S$GLB,, | Performed by: FAMILY MEDICINE

## 2022-11-15 PROCEDURE — 90686 IIV4 VACC NO PRSV 0.5 ML IM: CPT | Mod: S$GLB,,, | Performed by: FAMILY MEDICINE

## 2022-11-15 PROCEDURE — 99214 PR OFFICE/OUTPT VISIT, EST, LEVL IV, 30-39 MIN: ICD-10-PCS | Mod: 25,S$GLB,, | Performed by: FAMILY MEDICINE

## 2022-11-15 PROCEDURE — 90677 PNEUMOCOCCAL CONJUGATE VACCINE 20-VALENT: ICD-10-PCS | Mod: S$GLB,,, | Performed by: FAMILY MEDICINE

## 2022-11-15 PROCEDURE — 90677 PCV20 VACCINE IM: CPT | Mod: S$GLB,,, | Performed by: FAMILY MEDICINE

## 2022-11-15 PROCEDURE — 99214 OFFICE O/P EST MOD 30 MIN: CPT | Mod: 25,S$GLB,, | Performed by: FAMILY MEDICINE

## 2022-11-15 PROCEDURE — G0009 ADMIN PNEUMOCOCCAL VACCINE: HCPCS | Mod: S$GLB,,, | Performed by: FAMILY MEDICINE

## 2022-11-15 RX ORDER — PNV NO.95/FERROUS FUM/FOLIC AC 28MG-0.8MG
100 TABLET ORAL DAILY
COMMUNITY

## 2022-11-15 RX ORDER — CHLORHEXIDINE GLUCONATE 40 MG/ML
SOLUTION TOPICAL
Qty: 236 ML | Refills: 1 | Status: SHIPPED | OUTPATIENT
Start: 2022-11-15

## 2022-11-15 RX ORDER — HYDROCODONE BITARTRATE AND ACETAMINOPHEN 10; 325 MG/1; MG/1
1 TABLET ORAL EVERY 8 HOURS PRN
Qty: 90 TABLET | Refills: 0 | Status: SHIPPED | OUTPATIENT
Start: 2022-11-15 | End: 2023-02-14 | Stop reason: SDUPTHER

## 2022-11-15 NOTE — PROGRESS NOTES
"Subjective:       Patient ID: Mina Zelaya is a 57 y.o. male.    Chief Complaint: Annual Exam (Pt in for annual check-up)    Generally feeling well other than mild bilateral breast tenderness.  Has noticed that his breast have been getting slightly larger over the past year or so, slightly increased tenderness over the past few months.  No palpable masses.  No history of anabolic steroid use.  Does have a history of heavy alcohol consumption, however, used to drink 12 pack of beer in a box of wine daily for many years, but has not had anything to drink in about 8 months.  No abdominal pain.  No increased swelling.  Denies increased shortness of breath.  Using oxygen at night with BiPAP.    Review of Systems   Constitutional:  Negative for chills, fatigue and fever.   HENT:  Negative for congestion.    Eyes:  Negative for visual disturbance.   Respiratory:  Positive for shortness of breath (chronic).    Cardiovascular:  Negative for chest pain.   Gastrointestinal:  Negative for abdominal pain, nausea and vomiting.   Genitourinary:  Negative for difficulty urinating.   Musculoskeletal:  Positive for arthralgias and back pain.   Skin:  Negative for color change and rash.   Neurological:  Negative for dizziness.   Hematological:  Does not bruise/bleed easily.   Psychiatric/Behavioral:  Negative for agitation and confusion.      Objective:      Vitals:    11/15/22 1006   BP: 124/68   BP Location: Left arm   Patient Position: Sitting   BP Method: Medium (Manual)   Pulse: 85   Resp: 18   Temp: 98.2 °F (36.8 °C)   TempSrc: Temporal   SpO2: 96%   Weight: 116.7 kg (257 lb 6.2 oz)   Height: 5' 10" (1.778 m)     Physical Exam  Vitals and nursing note reviewed.   Constitutional:       General: He is not in acute distress.     Appearance: Normal appearance. He is well-developed. He is obese.   HENT:      Head: Normocephalic and atraumatic.   Cardiovascular:      Rate and Rhythm: Normal rate and regular rhythm.      Heart " sounds: Normal heart sounds.   Pulmonary:      Effort: Pulmonary effort is normal.      Breath sounds: Normal breath sounds.   Chest:   Breasts:     Right: Swelling present. No inverted nipple, mass or tenderness.      Left: Swelling present. No inverted nipple, mass or tenderness.   Musculoskeletal:      Right lower leg: No edema.      Left lower leg: No edema.   Skin:     General: Skin is warm and dry.   Neurological:      Mental Status: He is alert and oriented to person, place, and time.   Psychiatric:         Mood and Affect: Mood normal.         Behavior: Behavior normal.       Lab Results   Component Value Date    WBC 5.00 09/28/2021    HGB 14.1 09/28/2021    HCT 42.0 09/28/2021     09/28/2021    CHOL 260 (H) 09/28/2021    TRIG 82 09/28/2021     (H) 09/28/2021    ALT 35 09/28/2021    AST 56 (H) 09/28/2021     09/28/2021    K 3.9 09/28/2021     09/28/2021    CREATININE 0.9 09/28/2021    BUN 12 09/28/2021    CO2 27 09/28/2021    TSH 1.31 02/01/2018    PSA 1.1 09/28/2021    INR 1.0 07/14/2020    HGBA1C 6.2 (H) 09/28/2021      Assessment:       1. Gynecomastia, male    2. Need for vaccination    3. Borderline diabetes    4. COPD mixed type    5. Chronic respiratory failure with hypoxia    6. Pustular folliculitis    7. Prostate cancer screening    8. Hyperlipidemia, unspecified hyperlipidemia type    9. DDD (degenerative disc disease), lumbar          Plan:       Gynecomastia, male  -     CBC Auto Differential; Future; Expected date: 11/15/2022  -     TSH; Future; Expected date: 11/15/2022  -     Mammo Digital Diagnostic Bilat with Artem; Future; Expected date: 11/15/2022  Likely due to history of heavy alcohol use.  Will get imaging  Need for vaccination  -     Influenza - Quadrivalent *Preferred* (6 months+) (PF)  -     (In Office Administered) Pneumococcal Conjugate Vaccine (20 Valent) (IM)    Borderline diabetes  -     Hemoglobin A1C; Future; Expected date: 11/15/2022  -     TSH;  Future; Expected date: 11/15/2022  Low carb diet, exercise  COPD mixed type  -     CBC Auto Differential; Future; Expected date: 11/15/2022  Stable on current regimen  Chronic respiratory failure with hypoxia  -     CBC Auto Differential; Future; Expected date: 11/15/2022  -     TSH; Future; Expected date: 11/15/2022    Pustular folliculitis  -     chlorhexidine (HIBICLENS) 4 % external liquid; WASH DAILY FOR 3 DAYS THEN 3 DAYS PER WEEK FOR 2 WEEKS- REPEAT AS NEEDED  Dispense: 236 mL; Refill: 1    Prostate cancer screening  -     PSA, Screening; Future; Expected date: 11/15/2022    Hyperlipidemia, unspecified hyperlipidemia type  -     Comprehensive Metabolic Panel; Future; Expected date: 11/15/2022  -     Lipid Panel; Future; Expected date: 11/15/2022  -     TSH; Future; Expected date: 11/15/2022    DDD (degenerative disc disease), lumbar  -     HYDROcodone-acetaminophen (NORCO)  mg per tablet; Take 1 tablet by mouth every 8 (eight) hours as needed for Pain.  Dispense: 90 tablet; Refill: 0    Medication List with Changes/Refills   Current Medications    ALBUTEROL (PROVENTIL/VENTOLIN HFA) 90 MCG/ACTUATION INHALER    2 puffs every 4 hours as needed for cough, wheeze, or shortness of breath    ALBUTEROL-IPRATROPIUM (DUO-NEB) 2.5 MG-0.5 MG/3 ML NEBULIZER SOLUTION    Take 3 mLs by nebulization every 6 (six) hours as needed for Wheezing or Shortness of Breath. Rescue    ALLOPURINOL (ZYLOPRIM) 100 MG TABLET    Take 1 tablet (100 mg total) by mouth once daily.    ALLOPURINOL (ZYLOPRIM) 300 MG TABLET    TAKE 1 TABLET EVERY DAY    ASCORBIC ACID, VITAMIN C, (VITAMIN C) 1000 MG TABLET    Take 1,000 mg by mouth once daily.    ASPIRIN (ECOTRIN) 81 MG EC TABLET    Take 81 mg by mouth once daily.    BUMETANIDE (BUMEX) 2 MG TABLET    TAKE 1 TABLET EVERY DAY    CALCIUM CARBONATE (OS-VÍCTOR) 600 MG (1,500 MG) TAB    Take 600 mg by mouth once daily.    CYANOCOBALAMIN (VITAMIN B-12) 100 MCG TABLET    Take 100 mcg by mouth once  daily.    DIAZEPAM (VALIUM) 10 MG TAB    TAKE 1 TABLET BY MOUTH DAILY AS NEEDED FOR ANXIETY    FISH OIL-OMEGA-3 FATTY ACIDS 300-1,000 MG CAPSULE    Take 1,200 mg by mouth once daily.     MG TABLET    TAKE 1 TABLET THREE TIMES DAILY    LORATADINE (CLARITIN) 10 MG TABLET    Take 10 mg by mouth once daily.    METFORMIN (GLUCOPHAGE-XR) 500 MG ER 24HR TABLET    TAKE 1 TABLET EVERY MORNING  WITH  BREAKFAST    MONTELUKAST (SINGULAIR) 10 MG TABLET    TAKE 1 TABLET EVERY EVENING    MUPIROCIN (BACTROBAN) 2 % OINTMENT    Apply topically 3 (three) times daily.    POTASSIUM CHLORIDE SA (K-DUR,KLOR-CON) 20 MEQ TABLET    TAKE 2 TABLETS TWICE DAILY    ROSUVASTATIN (CRESTOR) 5 MG TABLET    TAKE 1 TABLET EVERY DAY    SPIRONOLACTONE (ALDACTONE) 25 MG TABLET    TAKE 1 TABLET EVERY DAY    STIOLTO RESPIMAT 2.5-2.5 MCG/ACTUATION MIST    INHALE 2 PUFFS INTO THE LUNGS EVERY DAY    TOFACITINIB (XELJANZ XR) 11 MG TB24    Take 11 mg by mouth once daily.    TRAZODONE (DESYREL) 100 MG TABLET    TAKE 1 TABLET AT BEDTIME    VENTOLIN HFA 90 MCG/ACTUATION INHALER    Inhale 1 puff into the lungs 4 (four) times daily as needed.   Changed and/or Refilled Medications    Modified Medication Previous Medication    CHLORHEXIDINE (HIBICLENS) 4 % EXTERNAL LIQUID chlorhexidine (HIBICLENS) 4 % external liquid       WASH DAILY FOR 3 DAYS THEN 3 DAYS PER WEEK FOR 2 WEEKS- REPEAT AS NEEDED    WASH DAILY FOR 3 DAYS THEN 3 DAYS PER WEEK FOR 2 WEEKS- REPEAT AS NEEDED    HYDROCODONE-ACETAMINOPHEN (NORCO)  MG PER TABLET HYDROcodone-acetaminophen (NORCO)  mg per tablet       Take 1 tablet by mouth every 8 (eight) hours as needed for Pain.    Take 1 tablet by mouth every 8 (eight) hours as needed for Pain.

## 2022-11-15 NOTE — PROGRESS NOTES
Verified pt by name and . Allergies verified by pt. Per physician orders pt was administered Influenza Vaccine IM to left deltoid and Prevnar 20 IM to right deltoid using aseptic technique. Pt tolerated well. No adverse effects or pain reported. MD notified.

## 2023-01-12 ENCOUNTER — HOSPITAL ENCOUNTER (OUTPATIENT)
Dept: RADIOLOGY | Facility: OTHER | Age: 58
Discharge: HOME | End: 2023-01-12
Attending: FAMILY MEDICINE
Payer: MEDICARE

## 2023-01-12 DIAGNOSIS — N62 GYNECOMASTIA, MALE: ICD-10-CM

## 2023-01-12 PROCEDURE — 77066 DX MAMMO INCL CAD BI: CPT | Mod: 26,HCNC,, | Performed by: RADIOLOGY

## 2023-01-12 PROCEDURE — 77062 BREAST TOMOSYNTHESIS BI: CPT | Mod: 26,HCNC,, | Performed by: RADIOLOGY

## 2023-01-12 PROCEDURE — 77062 MAMMO DIGITAL DIAGNOSTIC BILAT WITH TOMO: ICD-10-PCS | Mod: 26,HCNC,, | Performed by: RADIOLOGY

## 2023-01-12 PROCEDURE — 77062 BREAST TOMOSYNTHESIS BI: CPT | Mod: TC,HCNC

## 2023-01-12 PROCEDURE — 77066 MAMMO DIGITAL DIAGNOSTIC BILAT WITH TOMO: ICD-10-PCS | Mod: 26,HCNC,, | Performed by: RADIOLOGY

## 2023-01-18 ENCOUNTER — TELEPHONE (OUTPATIENT)
Dept: PRIMARY CARE CLINIC | Facility: CLINIC | Age: 58
End: 2023-01-18
Payer: MEDICARE

## 2023-01-18 NOTE — TELEPHONE ENCOUNTER
----- Message from Jesusita Xiao sent at 1/18/2023  8:53 AM CST -----  Contact: pt [phone]  Patient states that he was supposed to get a call back regarding his mammo.    Please call and advise.    Thank You

## 2023-01-18 NOTE — TELEPHONE ENCOUNTER
I called and spoke to the pt who declines referral to Plastic surgeon at this time, will call back if he changes his mind

## 2023-01-18 NOTE — TELEPHONE ENCOUNTER
Called pt regarding results, pt verbalized understanding. Pt wanted to know if there is anything he can do about gynecomastia?

## 2023-01-30 ENCOUNTER — TELEPHONE (OUTPATIENT)
Dept: PRIMARY CARE CLINIC | Facility: CLINIC | Age: 58
End: 2023-01-30
Payer: MEDICARE

## 2023-01-30 DIAGNOSIS — L01.02 PUSTULAR FOLLICULITIS: ICD-10-CM

## 2023-01-30 RX ORDER — MUPIROCIN 20 MG/G
OINTMENT TOPICAL 3 TIMES DAILY
Qty: 22 G | Refills: 5 | Status: SHIPPED | OUTPATIENT
Start: 2023-01-30 | End: 2023-02-14

## 2023-01-30 NOTE — TELEPHONE ENCOUNTER
----- Message from Jenireina Cosme sent at 1/30/2023 12:22 PM CST -----  Contact: Pt [phone]  Requesting an RX refill or new RX.  Is this a refill or new RX: refill   RX name and strength (copy/paste from chart):  mupirocin (BACTROBAN) 2 % ointment  Is this a 30 day or 90 day RX:   Pharmacy name and phone # (copy/paste from chart):  [id number] - [place], [place] - [address] AT [facility] [address]   Phone:  [phone]  Fax:  [phone]    The doctors have asked that we provide their patients with the following 2 reminders -- prescription refills can take up to 72 hours, and a friendly reminder that in the future you can use your [facility] account to request refills: yes

## 2023-01-30 NOTE — TELEPHONE ENCOUNTER
I contacted the pt who adv he is requesting the refill for the reoccurring bumps in his nose this was originally prescribed for

## 2023-02-14 ENCOUNTER — OFFICE VISIT (OUTPATIENT)
Dept: PRIMARY CARE CLINIC | Facility: CLINIC | Age: 58
End: 2023-02-14
Payer: MEDICARE

## 2023-02-14 VITALS
HEIGHT: 70 IN | RESPIRATION RATE: 19 BRPM | OXYGEN SATURATION: 96 % | DIASTOLIC BLOOD PRESSURE: 66 MMHG | BODY MASS INDEX: 36.99 KG/M2 | WEIGHT: 258.38 LBS | SYSTOLIC BLOOD PRESSURE: 124 MMHG | HEART RATE: 84 BPM

## 2023-02-14 DIAGNOSIS — K51.00 ULCERATIVE PANCOLITIS WITHOUT COMPLICATION: ICD-10-CM

## 2023-02-14 DIAGNOSIS — M51.36 DDD (DEGENERATIVE DISC DISEASE), LUMBAR: ICD-10-CM

## 2023-02-14 DIAGNOSIS — E66.01 SEVERE OBESITY (BMI 35.0-39.9) WITH COMORBIDITY: ICD-10-CM

## 2023-02-14 DIAGNOSIS — J44.9 COPD MIXED TYPE: ICD-10-CM

## 2023-02-14 DIAGNOSIS — E87.6 HYPOKALEMIA: ICD-10-CM

## 2023-02-14 DIAGNOSIS — Z23 NEED FOR VACCINATION: ICD-10-CM

## 2023-02-14 DIAGNOSIS — N62 GYNECOMASTIA, MALE: Primary | ICD-10-CM

## 2023-02-14 PROCEDURE — 1160F PR REVIEW ALL MEDS BY PRESCRIBER/CLIN PHARMACIST DOCUMENTED: ICD-10-PCS | Mod: HCNC,CPTII,S$GLB, | Performed by: FAMILY MEDICINE

## 2023-02-14 PROCEDURE — 3074F SYST BP LT 130 MM HG: CPT | Mod: HCNC,CPTII,S$GLB, | Performed by: FAMILY MEDICINE

## 2023-02-14 PROCEDURE — 99214 OFFICE O/P EST MOD 30 MIN: CPT | Mod: HCNC,S$GLB,, | Performed by: FAMILY MEDICINE

## 2023-02-14 PROCEDURE — 3078F PR MOST RECENT DIASTOLIC BLOOD PRESSURE < 80 MM HG: ICD-10-PCS | Mod: HCNC,CPTII,S$GLB, | Performed by: FAMILY MEDICINE

## 2023-02-14 PROCEDURE — 99999 PR PBB SHADOW E&M-EST. PATIENT-LVL V: CPT | Mod: PBBFAC,HCNC,, | Performed by: FAMILY MEDICINE

## 2023-02-14 PROCEDURE — 3074F PR MOST RECENT SYSTOLIC BLOOD PRESSURE < 130 MM HG: ICD-10-PCS | Mod: HCNC,CPTII,S$GLB, | Performed by: FAMILY MEDICINE

## 2023-02-14 PROCEDURE — 3078F DIAST BP <80 MM HG: CPT | Mod: HCNC,CPTII,S$GLB, | Performed by: FAMILY MEDICINE

## 2023-02-14 PROCEDURE — 99214 PR OFFICE/OUTPT VISIT, EST, LEVL IV, 30-39 MIN: ICD-10-PCS | Mod: HCNC,S$GLB,, | Performed by: FAMILY MEDICINE

## 2023-02-14 PROCEDURE — 3008F BODY MASS INDEX DOCD: CPT | Mod: HCNC,CPTII,S$GLB, | Performed by: FAMILY MEDICINE

## 2023-02-14 PROCEDURE — 1160F RVW MEDS BY RX/DR IN RCRD: CPT | Mod: HCNC,CPTII,S$GLB, | Performed by: FAMILY MEDICINE

## 2023-02-14 PROCEDURE — 3008F PR BODY MASS INDEX (BMI) DOCUMENTED: ICD-10-PCS | Mod: HCNC,CPTII,S$GLB, | Performed by: FAMILY MEDICINE

## 2023-02-14 PROCEDURE — 1159F MED LIST DOCD IN RCRD: CPT | Mod: HCNC,CPTII,S$GLB, | Performed by: FAMILY MEDICINE

## 2023-02-14 PROCEDURE — 1159F PR MEDICATION LIST DOCUMENTED IN MEDICAL RECORD: ICD-10-PCS | Mod: HCNC,CPTII,S$GLB, | Performed by: FAMILY MEDICINE

## 2023-02-14 PROCEDURE — 99999 PR PBB SHADOW E&M-EST. PATIENT-LVL V: ICD-10-PCS | Mod: PBBFAC,HCNC,, | Performed by: FAMILY MEDICINE

## 2023-02-14 RX ORDER — HYDROCODONE BITARTRATE AND ACETAMINOPHEN 10; 325 MG/1; MG/1
1 TABLET ORAL EVERY 8 HOURS PRN
Qty: 90 TABLET | Refills: 0 | Status: SHIPPED | OUTPATIENT
Start: 2023-02-14 | End: 2023-06-02 | Stop reason: SDUPTHER

## 2023-02-14 RX ORDER — MUPIROCIN 20 MG/G
OINTMENT TOPICAL 3 TIMES DAILY
COMMUNITY
End: 2023-09-12 | Stop reason: SDUPTHER

## 2023-02-14 RX ORDER — ZOSTER VACCINE RECOMBINANT, ADJUVANTED 50 MCG/0.5
0.5 KIT INTRAMUSCULAR ONCE
Qty: 1 EACH | Refills: 1 | Status: SHIPPED | OUTPATIENT
Start: 2023-02-14 | End: 2023-02-14

## 2023-02-14 NOTE — PROGRESS NOTES
"Subjective:       Patient ID: Mina Zelaya is a 57 y.o. male.    Chief Complaint: Personal Problem    Bilateral breast tenderness enlargement for the last year or so.  Mammogram showed evidence of gynecomastia, but no worrisome findings.  Says that his breast have been enlarged for about a year, but increasing tenderness over the last 6-9 months.  Started taking spironolactone last April for persistent lower extremity edema, says he is unsure if his symptoms worsened before or after he started that.  He has been off alcohol for the past 360 days.  Denies chest pain or palpitations.  Chronic shortness of breath, no recent exacerbations.  Chronic hip and lower back pain, requesting refill of pain meds, last filled 3 months ago.   reviewed, no worrisome findings.    Review of Systems   Constitutional:  Negative for chills and fever.   Respiratory:  Positive for shortness of breath.    Cardiovascular:  Negative for chest pain.   Musculoskeletal:  Positive for arthralgias and back pain.   Allergic/Immunologic: Negative for immunocompromised state.   Psychiatric/Behavioral:  Negative for agitation and confusion.      Objective:      Vitals:    23 1010   BP: 124/66   BP Location: Left arm   Patient Position: Sitting   BP Method: Medium (Manual)   Pulse: 84   Resp: 19   SpO2: 96%   Weight: 117.2 kg (258 lb 6.1 oz)   Height: 5' 10" (1.778 m)     Physical Exam  Vitals and nursing note reviewed.   Constitutional:       General: He is not in acute distress.     Appearance: Normal appearance. He is well-developed.   HENT:      Head: Normocephalic and atraumatic.   Cardiovascular:      Rate and Rhythm: Normal rate and regular rhythm.      Heart sounds: Normal heart sounds.   Pulmonary:      Effort: Pulmonary effort is normal.      Breath sounds: Normal breath sounds.   Musculoskeletal:      Right lower le+ Pitting Edema present.      Left lower le+ Pitting Edema present.   Skin:     General: Skin is warm and " dry.   Neurological:      Mental Status: He is alert and oriented to person, place, and time.   Psychiatric:         Mood and Affect: Mood normal.         Behavior: Behavior normal.       Lab Results   Component Value Date    WBC 6.94 11/28/2022    HGB 12.2 (L) 11/28/2022    HCT 38.7 (L) 11/28/2022     11/28/2022    CHOL 201 (H) 11/28/2022    TRIG 83 11/28/2022    HDL 53 11/28/2022    ALT 19 11/28/2022    AST 29 11/28/2022     11/28/2022    K 4.0 11/28/2022     11/28/2022    CREATININE 0.9 11/28/2022    BUN 18 11/28/2022    CO2 28 11/28/2022    TSH 1.452 11/28/2022    PSA 0.77 11/28/2022    INR 1.0 07/14/2020    HGBA1C 6.1 (H) 11/28/2022      Assessment:       1. Gynecomastia, male    2. Severe obesity (BMI 35.0-39.9) with comorbidity    3. Ulcerative pancolitis without complication    4. COPD mixed type    5. Hypokalemia    6. DDD (degenerative disc disease), lumbar    7. Need for vaccination          Plan:       Gynecomastia, male  Not entirely clear if symptoms predated the start of spironolactone, but timing is slightly suspect.  Will discontinue spironolactone for now and message for update in 6 weeks.  If symptoms persist, consider referral to Plastic surgery  Severe obesity (BMI 35.0-39.9) with comorbidity  Low carb diet, exercise  Ulcerative pancolitis without complication  Chronic, stable  COPD mixed type  Stable on current regimen, followed by pulmonology  Hypokalemia  -     Renal Function Panel; Future; Expected date: 03/14/2023  Recheck levels in a few weeks after stopping potassium-sparing diuretic  DDD (degenerative disc disease), lumbar  -     HYDROcodone-acetaminophen (NORCO)  mg per tablet; Take 1 tablet by mouth every 8 (eight) hours as needed for Pain.  Dispense: 90 tablet; Refill: 0  Advised to use meds sparingly  Need for vaccination  -     varicella-zoster gE-AS01B, PF, (SHINGRIX, PF,) 50 mcg/0.5 mL injection; Inject 0.5 mLs into the muscle once. for 1 dose  Dispense: 1  each; Refill: 1      Medication List with Changes/Refills   New Medications    VARICELLA-ZOSTER GE-AS01B, PF, (SHINGRIX, PF,) 50 MCG/0.5 ML INJECTION    Inject 0.5 mLs into the muscle once. for 1 dose   Current Medications    ALBUTEROL (PROVENTIL/VENTOLIN HFA) 90 MCG/ACTUATION INHALER    2 puffs every 4 hours as needed for cough, wheeze, or shortness of breath    ALBUTEROL-IPRATROPIUM (DUO-NEB) 2.5 MG-0.5 MG/3 ML NEBULIZER SOLUTION    Take 3 mLs by nebulization every 6 (six) hours as needed for Wheezing or Shortness of Breath. Rescue    ALLOPURINOL (ZYLOPRIM) 100 MG TABLET    TAKE 1 TABLET EVERY DAY    ALLOPURINOL (ZYLOPRIM) 300 MG TABLET    TAKE 1 TABLET EVERY DAY    ASCORBIC ACID, VITAMIN C, (VITAMIN C) 1000 MG TABLET    Take 1,000 mg by mouth once daily.    ASPIRIN (ECOTRIN) 81 MG EC TABLET    Take 81 mg by mouth once daily.    BUMETANIDE (BUMEX) 2 MG TABLET    TAKE 1 TABLET EVERY DAY    CALCIUM CARBONATE (OS-VÍCTOR) 600 MG (1,500 MG) TAB    Take 600 mg by mouth once daily.    CHLORHEXIDINE (HIBICLENS) 4 % EXTERNAL LIQUID    WASH DAILY FOR 3 DAYS THEN 3 DAYS PER WEEK FOR 2 WEEKS- REPEAT AS NEEDED    CYANOCOBALAMIN (VITAMIN B-12) 100 MCG TABLET    Take 100 mcg by mouth once daily.    FISH OIL-OMEGA-3 FATTY ACIDS 300-1,000 MG CAPSULE    Take 1,200 mg by mouth once daily.     MG TABLET    TAKE 1 TABLET THREE TIMES DAILY    LORATADINE (CLARITIN) 10 MG TABLET    Take 10 mg by mouth once daily.    METFORMIN (GLUCOPHAGE-XR) 500 MG ER 24HR TABLET    TAKE 1 TABLET EVERY MORNING  WITH  BREAKFAST    MONTELUKAST (SINGULAIR) 10 MG TABLET    TAKE 1 TABLET EVERY EVENING    MUPIROCIN (BACTROBAN) 2 % OINTMENT    Apply topically 3 (three) times daily.    POTASSIUM CHLORIDE SA (K-DUR,KLOR-CON) 20 MEQ TABLET    TAKE 2 TABLETS TWICE A DAY    ROSUVASTATIN (CRESTOR) 5 MG TABLET    TAKE 1 TABLET EVERY DAY    STIOLTO RESPIMAT 2.5-2.5 MCG/ACTUATION MIST    INHALE 2 PUFFS INTO THE LUNGS EVERY DAY    TOFACITINIB (XELJANZ XR) 11 MG TB24     Take 11 mg by mouth once daily.    TRAZODONE (DESYREL) 100 MG TABLET    TAKE 1 TABLET AT BEDTIME    VENTOLIN HFA 90 MCG/ACTUATION INHALER    Inhale 1 puff into the lungs 4 (four) times daily as needed.   Changed and/or Refilled Medications    Modified Medication Previous Medication    HYDROCODONE-ACETAMINOPHEN (NORCO)  MG PER TABLET HYDROcodone-acetaminophen (NORCO)  mg per tablet       Take 1 tablet by mouth every 8 (eight) hours as needed for Pain.    Take 1 tablet by mouth every 8 (eight) hours as needed for Pain.   Discontinued Medications    DIAZEPAM (VALIUM) 10 MG TAB    TAKE 1 TABLET BY MOUTH DAILY AS NEEDED FOR ANXIETY    MUPIROCIN (BACTROBAN) 2 % OINTMENT    Apply topically 3 (three) times daily.    SPIRONOLACTONE (ALDACTONE) 25 MG TABLET    TAKE 1 TABLET EVERY DAY

## 2023-03-20 ENCOUNTER — TELEPHONE (OUTPATIENT)
Dept: PULMONOLOGY | Facility: CLINIC | Age: 58
End: 2023-03-20
Payer: MEDICARE

## 2023-03-20 NOTE — TELEPHONE ENCOUNTER
Spoke to patient.  I've gone through his whole medication list and don't see where Dr. Leiva has prescribed this medication.  Pt stated he'd check with his PCP.  Verbalized understanding.

## 2023-03-20 NOTE — TELEPHONE ENCOUNTER
----- Message from Rafaela Marquez sent at 3/20/2023  9:01 AM CDT -----  Who Called: Pt    What is the request in detail: Requesting call back to discuss New Rx, new pharmacy for Michellejoseph (unsure of spelling)      [facility] - [place], [place] - [address]  [address] [facility] [address]  Phone: [phone] Fax: [phone]    Can the clinic reply by [facility]? No    Best Call Back Number: [phone]      Additional Information:

## 2023-03-21 DIAGNOSIS — J44.9 CHRONIC OBSTRUCTIVE PULMONARY DISEASE, UNSPECIFIED COPD TYPE: ICD-10-CM

## 2023-03-21 DIAGNOSIS — J44.9 COPD MIXED TYPE: ICD-10-CM

## 2023-03-21 NOTE — TELEPHONE ENCOUNTER
----- Message from Stacy Valle sent at 3/21/2023 10:46 AM CDT -----  Contact: self/[phone]  Pt called in getting his Rx resent to another pharmacy. The Rx STIOLTO RESPIMAT 2.5-2.5 mcg/actuation Mist 24 g 1 10/31/2022   Sig: INHALE 2 PUFFS INTO THE LUNGS EVERY DAY      [facility] - [place], [place] - [address]   [address]  [facility] [address]  Phone: [phone] Fax: [phone]    Please advise

## 2023-03-21 NOTE — TELEPHONE ENCOUNTER
No new care gaps identified.  [facility] Embedded Care Gaps. Reference number: [id number]. 3/21/2023   10:58:40 AM CDT

## 2023-03-22 RX ORDER — TIOTROPIUM BROMIDE AND OLODATEROL 3.124; 2.736 UG/1; UG/1
SPRAY, METERED RESPIRATORY (INHALATION)
Qty: 24 G | Refills: 3 | Status: SHIPPED | OUTPATIENT
Start: 2023-03-22

## 2023-03-28 RX ORDER — TIOTROPIUM BROMIDE AND OLODATEROL 3.124; 2.736 UG/1; UG/1
2 SPRAY, METERED RESPIRATORY (INHALATION) DAILY
Qty: 24 G | Refills: 5 | Status: SHIPPED | OUTPATIENT
Start: 2023-03-28 | End: 2023-06-02 | Stop reason: SDUPTHER

## 2023-03-28 NOTE — TELEPHONE ENCOUNTER
----- Message from Otto Bruce MD sent at 2/14/2023 10:54 AM CST -----  Call patient and see if any improvement in breast enlargement and tenderness since stopping spironolactone.

## 2023-03-28 NOTE — TELEPHONE ENCOUNTER
No new care gaps identified.  [facility] Embedded Care Gaps. Reference number: [id number]. 3/28/2023   9:37:23 AM PADMINIT

## 2023-03-28 NOTE — TELEPHONE ENCOUNTER
Patient stated that the breast enlargement hasn't improved but the tenderness has improved.     Patient is also requesting a refill on stiolto 2.5 mcg

## 2023-04-25 RX ORDER — TRAZODONE HYDROCHLORIDE 100 MG/1
TABLET ORAL
Qty: 90 TABLET | Refills: 3 | Status: SHIPPED | OUTPATIENT
Start: 2023-04-25

## 2023-04-25 RX ORDER — BUMETANIDE 2 MG/1
TABLET ORAL
Qty: 90 TABLET | Refills: 2 | Status: SHIPPED | OUTPATIENT
Start: 2023-04-25

## 2023-04-25 NOTE — TELEPHONE ENCOUNTER
Care Due:                  Date            Visit Type   Department     Provider  --------------------------------------------------------------------------------                                EP -                              PRIMARY      [facility]  Last Visit: 02-      CARE ([facility])   PRIMARY CARE   Otto Bruce  Next Visit: None Scheduled  None         None Found                                                            Last  Test          Frequency    Reason                     Performed    Due Date  --------------------------------------------------------------------------------    Uric Acid...  12 months..  allopurinoL..............  Not Found    Overdue    [employer] Embedded Care Gaps. Reference number: [id number]. 4/25/2023   4:22:29 PM CDT

## 2023-04-26 NOTE — TELEPHONE ENCOUNTER
Refill Decision Note   Mina Zelaya  is requesting a refill authorization.  Brief Assessment and Rationale for Refill:  Approve     Medication Therapy Plan:       Medication Reconciliation Completed: No   Comments:     Provider Staff:     Action is required for this patient.   Please see care gap opportunities below in Care Due Message.     Thanks!  [facility]     Appointments      Date Provider   Last Visit   2/14/2023 Otto Bruce MD   Next Visit   Visit date not found Otto Bruce MD     Note composed:9:07 PM 04/25/2023           Note composed:9:07 PM 04/25/2023

## 2023-04-26 NOTE — TELEPHONE ENCOUNTER
Refill Routing Note   Medication(s) are not appropriate for processing by [facility] for the following reason(s):      Drug-disease interaction    [facility] action(s):  Defer Labs due     Medication Therapy Plan: Drug-Disease: traZODone and Hypokalemia; Drug disease: bumetanide and hypokalemia      Appointments  past 12m or future 3m with PCP    Date Provider   Last Visit   2/14/2023 Otto Bruce MD   Next Visit   Visit date not found Otto Bruce MD   ED visits in past 90 days: 0        Note composed:8:36 PM 04/25/2023

## 2023-05-31 ENCOUNTER — OFFICE VISIT (OUTPATIENT)
Dept: PULMONOLOGY | Facility: CLINIC | Age: 58
End: 2023-05-31
Payer: MEDICARE

## 2023-05-31 VITALS
DIASTOLIC BLOOD PRESSURE: 69 MMHG | BODY MASS INDEX: 37.15 KG/M2 | SYSTOLIC BLOOD PRESSURE: 133 MMHG | OXYGEN SATURATION: 94 % | WEIGHT: 258.94 LBS | HEART RATE: 82 BPM

## 2023-05-31 DIAGNOSIS — R60.0 BILATERAL LEG EDEMA: ICD-10-CM

## 2023-05-31 DIAGNOSIS — J98.6 DIAPHRAGM PARALYSIS: Primary | ICD-10-CM

## 2023-05-31 DIAGNOSIS — J96.12 CHRONIC RESPIRATORY FAILURE WITH HYPERCAPNIA: ICD-10-CM

## 2023-05-31 DIAGNOSIS — J43.9 PULMONARY EMPHYSEMA, UNSPECIFIED EMPHYSEMA TYPE: ICD-10-CM

## 2023-05-31 PROCEDURE — 3078F PR MOST RECENT DIASTOLIC BLOOD PRESSURE < 80 MM HG: ICD-10-PCS | Mod: CPTII,S$GLB,, | Performed by: INTERNAL MEDICINE

## 2023-05-31 PROCEDURE — 1159F MED LIST DOCD IN RCRD: CPT | Mod: CPTII,S$GLB,, | Performed by: INTERNAL MEDICINE

## 2023-05-31 PROCEDURE — 3008F BODY MASS INDEX DOCD: CPT | Mod: CPTII,S$GLB,, | Performed by: INTERNAL MEDICINE

## 2023-05-31 PROCEDURE — 3075F SYST BP GE 130 - 139MM HG: CPT | Mod: CPTII,S$GLB,, | Performed by: INTERNAL MEDICINE

## 2023-05-31 PROCEDURE — 3075F PR MOST RECENT SYSTOLIC BLOOD PRESS GE 130-139MM HG: ICD-10-PCS | Mod: CPTII,S$GLB,, | Performed by: INTERNAL MEDICINE

## 2023-05-31 PROCEDURE — 99999 PR PBB SHADOW E&M-EST. PATIENT-LVL IV: CPT | Mod: PBBFAC,,, | Performed by: INTERNAL MEDICINE

## 2023-05-31 PROCEDURE — 99999 PR PBB SHADOW E&M-EST. PATIENT-LVL IV: ICD-10-PCS | Mod: PBBFAC,,, | Performed by: INTERNAL MEDICINE

## 2023-05-31 PROCEDURE — 99214 PR OFFICE/OUTPT VISIT, EST, LEVL IV, 30-39 MIN: ICD-10-PCS | Mod: S$GLB,,, | Performed by: INTERNAL MEDICINE

## 2023-05-31 PROCEDURE — 99499 UNLISTED E&M SERVICE: CPT | Mod: HCNC,S$GLB,, | Performed by: INTERNAL MEDICINE

## 2023-05-31 PROCEDURE — 3078F DIAST BP <80 MM HG: CPT | Mod: CPTII,S$GLB,, | Performed by: INTERNAL MEDICINE

## 2023-05-31 PROCEDURE — 3008F PR BODY MASS INDEX (BMI) DOCUMENTED: ICD-10-PCS | Mod: CPTII,S$GLB,, | Performed by: INTERNAL MEDICINE

## 2023-05-31 PROCEDURE — 1159F PR MEDICATION LIST DOCUMENTED IN MEDICAL RECORD: ICD-10-PCS | Mod: CPTII,S$GLB,, | Performed by: INTERNAL MEDICINE

## 2023-05-31 PROCEDURE — 99214 OFFICE O/P EST MOD 30 MIN: CPT | Mod: S$GLB,,, | Performed by: INTERNAL MEDICINE

## 2023-05-31 NOTE — PROGRESS NOTES
5/31/2023    Mina Zelaya  Office Note    Chief Complaint   Patient presents with    COPD     Yearly Check Up       HPI:  5/31/2023 didn't get dental wk as cannot lay back had partial root canal-- no infection issues. Uses niv for sleep, cannot lay flat, starr, very limited with starr-- barely able to breath. Uses inhalers.....  H/o bilat diaphragm paralysis....  On rx for pre diabetes    Niv copay 200/month -- pt would prefer less copay.  Willing to try bipap    10/3/2022 had chr drip and cough, uses niv -- couldn't sleep without niv as would stop breathing.  Having severe hip problems. Limited by hips somewhat but also hip burning pain.  No prednisone nor abx, uses stiolto.  Grandson entering [employer].    Patient Instructions   You are using and benefiting from non invasive ventilator.      Would recommend considering Ozempic for diabetes as my facilitate weight loss.  Weight loss will help respiratory failure and hip problems.    Hip therapy may help mobility.     Would recommend six min walk and portable oxygen concentrator.     Letter done for dental work    We briefly discuss weight loss surgery.      3/25/2021 - uses niv machine all night and freq day -- pt had insurance change and needs paper work completed.  Pt cannot lay flat.  Got Moderna vaccine.      Patient Instructions   You are using and having great benefit from non invasive ventilator.  You had had numerous hospital stays due respiratory failure prior to using non invasive ventilator.  You have bilateral diaphragm paralysis problems prior to 2008 by your symptoms.  You need to continue this treatment.  1/22/2020- breathing so bad cannot do activity.  No channge, needs rescue meds to help but not relieve sob, uses niv nightly - use roughly 8-10 hrs daily.  Uses stiolto with good results, no abx used.      Patient Instructions   No great changes, need prompt evaluation for lung problems.    May need antibiotic.    You need stiolto    May 22, 2019-  breathing difficult today, SOB worse with exertion, states recue inhaler does not help much. Worsened in past 24 hours, affected with weather change. Currently using NIV nightly. Nosebleeds resolved. NO prednisone use in past year. Cold in Nov 2018, tx antibiotics. On supplemental oxygen wearing 2-4 hours daily and all night. Currently using stiolto daily.   Discussed with patient above for education the following:    Continuation of current therapy  Continue NIV therapy for diaphragm paralysis induced respiratory failure.  Continue Stiolto daily    Cause of paralysis not clear, not likely reversable.    Plication of diaphragm is invasive surgical, may not help much, useful for one side paralysis.  Could have eval at main [facility]?  Follow here later?    Discussed weight loss services at [facility], if interested contact clinic for referral  Abdominal weight does effect ability to breath when bending over.    Will try new medication for 2 weeks, Trelegy 1 puff daily, if not benefit return to Stiolto daily, if you feel a benefit contact clinic and will order  April 16, 2018- lives alone, sees grandson daily post school, having freq nose bleed, uses full face mask for niv.  Frustrated, marked starr.    jan 11, 2017- inactivity ppt weight gain, mood bad at times, daughter and grandson moved out and  Pt feeling down a bit.     Uses o2 and non invasive ventilator -- extreme air hunger with activity and supine. aspriates 2/day to 2 /week.  Uses bronchial rx.      Uses niv and o2 every night for sleep and for naps 1-2 daily.  Feels like worsening.    Sept 9, 2016HPI: worse off advair and spiriva, irratents worsen, niv used 12/24 daily, cannot bend over and uc doing poorly due to abd pain.        The chief compliant  problem varies with instablilty at time    PFSH:  Past Medical History:   Diagnosis Date    Arthritis     Asthma     Chronic bronchitis     Emphysema of lung     Heavy alcohol consumption 10/1/2018    Type 2 diabetes  mellitus with hyperlipidemia 2018         History reviewed. No pertinent surgical history.  Social History     Tobacco Use    Smoking status: Former     Packs/day: 1.00     Years: 15.00     Pack years: 15.00     Types: Cigarettes     Quit date: 2003     Years since quittin.9    Smokeless tobacco: Never   Substance Use Topics    Alcohol use: Yes     Alcohol/week: 25.0 standard drinks     Types: 25 Cans of beer per week    Drug use: No     Family History   Problem Relation Age of Onset    Stroke Mother     No Known Problems Father     Pneumonia Sister     Stroke Brother     No Known Problems Maternal Aunt     Stroke Paternal Aunt     Cancer Paternal Uncle     Cancer Maternal Grandmother     Heart failure Maternal Grandfather     Stroke Paternal Grandmother     No Known Problems Paternal Grandfather     Stroke Brother     No Known Problems Brother     No Known Problems Brother      Review of patient's allergies indicates:   Allergen Reactions    Sulfa (sulfonamide antibiotics) Rash       Performance Status:The patient's activity level is housebound activities.      Review of Systems:  a review of eleven systems covering constitutional, Eye, HEENT, Psych, Respiratory, Cardiac, GI, , Musculoskeletal, Endocrine, Dermatologic was negative except for pertinent findings as listed ABOVE and below: all good except r elbow arthritis and neck pain SOB with exertion.    Exam:Comprehensive exam done.   /69   Pulse 82   Wt 117.4 kg (258 lb 14.9 oz)   SpO2 (!) 94% Comment: Room air at rest  BMI 37.15 kg/m²   Exam included Vitals as listed, and patient's appearance and affect and alertness and mood, oral exam for yeast and hygiene and pharynx lesions and Mallapatti (M) score, neck with inspection for jvd and masses and thyroid abnormalities and lymph nodes (supraclavicular and infraclavicular nodes and axillary also examined and noted if abn), chest exam included symmetry and effort and fremitus and  percussion and auscultation, cardiac exam included rhythm and gallops and murmur and rubs and jvd and edema, abdominal exam for mass and hepatosplenomegaly and tenderness and hernias and bowel sounds, Musculoskeletal exam with muscle tone and posture and mobility/gait and  strength, and skin for rashes and cyanosis and pallor and turgor, extremity for clubbing.  Findings were normal except for pertinent findings listed below: affect flat  M3 and no diaphragm motion bilaterally-- percussion and palpation no abd movement, good bs but shallow.    Radiographs reviewed: was not done   XR CHEST PA AND LATERAL 11/07/2018   Redemonstration of mild bilateral basal lung stranding a chronic finding apparently.  No new pulmonary changes.    The cardiac silhouette is normal in size. The hilar and mediastinal contours are unremarkable.      Labs reviewed  Results for HEAVEN HARDIN (MRN [id number]) as of 5/22/2019 09:29   Ref. Range 2/11/2019 07:14   Sodium Latest Ref Range: 136 - 145 mmol/L 139   Potassium Latest Ref Range: 3.5 - 5.1 mmol/L 3.6   Chloride Latest Ref Range: 101 - 111 mmol/L 99 (L)   CO2 Latest Ref Range: 23 - 29 mmol/L 27   Anion Gap Latest Ref Range: 8 - 16 mmol/L 13   BUN, Bld Latest Ref Range: 6 - 20 mg/dL 17   Creatinine Latest Ref Range: 0.5 - 1.4 mg/dL 0.8   eGFR if non African American Latest Ref Range: >60 mL/min/1.73 m^2 >60.0   eGFR if African American Latest Ref Range: >60 mL/min/1.73 m^2 >60.0   Glucose Latest Ref Range: 74 - 118 mg/dL 123 (H)   Calcium Latest Ref Range: 8.6 - 10.0 mg/dL 8.8       PFT was not done    Plan:  Clinical impression is resonably certain and repeated evaluation prn +/- follow up will be needed as below.    Heaven was seen today for follow-up and shortness of breath.    Diagnoses and all orders for this visit:    Diaphragm paralysis   - Continue NIV therapy  COPD mixed type  -     montelukast (SINGULAIR) 10 mg tablet; Take 1 tablet (10 mg total) by mouth every evening.  -      albuterol-ipratropium (DUO-NEB) 2.5 mg-0.5 mg/3 mL nebulizer solution; Take 3 mLs by nebulization every 6 (six) hours as needed for Wheezing or Shortness of Breath. Rescue  -     VENTOLIN HFA 90 mcg/actuation inhaler; Inhale 1 puff into the lungs 4 (four) times daily as needed.    Moderate persistent asthma, uncomplicated  -     montelukast (SINGULAIR) 10 mg tablet; Take 1 tablet (10 mg total) by mouth every evening.  -     albuterol-ipratropium (DUO-NEB) 2.5 mg-0.5 mg/3 mL nebulizer solution; Take 3 mLs by nebulization every 6 (six) hours as needed for Wheezing or Shortness of Breath. Rescue  -     VENTOLIN HFA 90 mcg/actuation inhaler; Inhale 1 puff into the lungs 4 (four) times daily as needed.    Chronic respiratory failure with hypoxia   - continue NIV therapy  Chronic sinus complaints  -     montelukast (SINGULAIR) 10 mg tablet; Take 1 tablet (10 mg total) by mouth every evening.    Chronic obstructive pulmonary disease, unspecified COPD type  -     albuterol-ipratropium (DUO-NEB) 2.5 mg-0.5 mg/3 mL nebulizer solution; Take 3 mLs by nebulization every 6 (six) hours as needed for Wheezing or Shortness of Breath. Rescue    Class 2 obesity with alveolar hypoventilation, serious comorbidity, and body mass index (BMI) of 37.0 to 37.9 in adult   - discussed [facility]      Follow up in about 1 year (around 5/31/2024), or if symptoms worsen or fail to improve.    Discussed with patient above for education the following:         Mina was seen today for copd.    Diagnoses and all orders for this visit:    Diaphragm paralysis    Pulmonary emphysema, unspecified emphysema type    Chronic respiratory failure with hypercapnia    Bilateral leg edema            Follow up in about 1 year (around 5/31/2024), or if symptoms worsen or fail to improve.    Discussed with patient above for education the following:      Patient Instructions   [facility] attending statement done today  Cxr and ct chest 4/2016  "viewed-- lungs good but high diaphragms..  Exam shows evidence for ongoing diaphragm paralysis    Weight loss should help breathing dramatically-- consider :::from uptodate----Tirzepatide is a novel GLP-1 and GIP receptor agonist administered by once-weekly subcutaneous injection [38]. It is effective in the treatment of obesity in patients with and without diabetes mellitus [9] (see "Glucagon-like peptide 1-based therapies for the treatment of type 2 diabetes mellitus", section on 'Weight loss'). As examples:  ?In an open-label trial including over 1800 patients with diabetes, once-weekly tirzepatide (in varying doses) was compared with semaglutide 1 mg [39]. At 40 weeks, reduction in body weight with all doses of tirzepatide was greater compared with semaglutide (5, 10, and 15 mg of tirzepatide; -7.6, -9.3, and -11.2 kg, respectively: 1 mg of semaglutide; -5.7 kg). Of note, no participants received semaglutide of 2.4 mg once weekly, which is the recommended dose for treatment of obesity. (See 'Dosing and contraindications' above.)  ?In a double-blind placebo-controlled randomized trial including over 2500 adults with obesity (but without diabetes), tirzepatide once weekly was compared with placebo [9]. At 72 weeks, reduction in body weight at all tirzepatide doses (5, 10, and 15 mg) was greater compared with placebo (-16.1, -22.2, and -23.6 kg, respectively, versus -2.4 kg).        Would use bipap to cut cost as able.  Niv would be preferred.      Chenal took 37 min    "

## 2023-05-31 NOTE — PATIENT INSTRUCTIONS
"[facility] attending statement done today  Cxr and ct chest 4/2016 viewed-- lungs good but high diaphragms..  Exam shows evidence for ongoing diaphragm paralysis    Weight loss should help breathing dramatically-- consider :::from uptodate----Tirzepatide is a novel GLP-1 and GIP receptor agonist administered by once-weekly subcutaneous injection [38]. It is effective in the treatment of obesity in patients with and without diabetes mellitus [9] (see "Glucagon-like peptide 1-based therapies for the treatment of type 2 diabetes mellitus", section on 'Weight loss'). As examples:  ?In an open-label trial including over 1800 patients with diabetes, once-weekly tirzepatide (in varying doses) was compared with semaglutide 1 mg [39]. At 40 weeks, reduction in body weight with all doses of tirzepatide was greater compared with semaglutide (5, 10, and 15 mg of tirzepatide; -7.6, -9.3, and -11.2 kg, respectively: 1 mg of semaglutide; -5.7 kg). Of note, no participants received semaglutide of 2.4 mg once weekly, which is the recommended dose for treatment of obesity. (See 'Dosing and contraindications' above.)  ?In a double-blind placebo-controlled randomized trial including over 2500 adults with obesity (but without diabetes), tirzepatide once weekly was compared with placebo [9]. At 72 weeks, reduction in body weight at all tirzepatide doses (5, 10, and 15 mg) was greater compared with placebo (-16.1, -22.2, and -23.6 kg, respectively, versus -2.4 kg).        Would use bipap to cut cost as able.  Niv would be preferred.  "

## 2023-06-02 ENCOUNTER — OFFICE VISIT (OUTPATIENT)
Dept: PRIMARY CARE CLINIC | Facility: CLINIC | Age: 58
End: 2023-06-02
Payer: MEDICARE

## 2023-06-02 VITALS
DIASTOLIC BLOOD PRESSURE: 82 MMHG | WEIGHT: 258.5 LBS | OXYGEN SATURATION: 97 % | HEART RATE: 75 BPM | RESPIRATION RATE: 18 BRPM | BODY MASS INDEX: 37.01 KG/M2 | SYSTOLIC BLOOD PRESSURE: 132 MMHG | HEIGHT: 70 IN | TEMPERATURE: 97 F

## 2023-06-02 DIAGNOSIS — M25.552 PAIN OF LEFT HIP: ICD-10-CM

## 2023-06-02 DIAGNOSIS — R73.03 BORDERLINE DIABETES: ICD-10-CM

## 2023-06-02 DIAGNOSIS — L01.02 PUSTULAR FOLLICULITIS: ICD-10-CM

## 2023-06-02 DIAGNOSIS — M25.551 BILATERAL HIP PAIN: Primary | ICD-10-CM

## 2023-06-02 DIAGNOSIS — M25.551 PAIN OF RIGHT HIP: ICD-10-CM

## 2023-06-02 DIAGNOSIS — M25.552 BILATERAL HIP PAIN: Primary | ICD-10-CM

## 2023-06-02 DIAGNOSIS — M51.36 DDD (DEGENERATIVE DISC DISEASE), LUMBAR: ICD-10-CM

## 2023-06-02 PROCEDURE — 3008F BODY MASS INDEX DOCD: CPT | Mod: CPTII,S$GLB,, | Performed by: FAMILY MEDICINE

## 2023-06-02 PROCEDURE — 3075F SYST BP GE 130 - 139MM HG: CPT | Mod: CPTII,S$GLB,, | Performed by: FAMILY MEDICINE

## 2023-06-02 PROCEDURE — 3008F PR BODY MASS INDEX (BMI) DOCUMENTED: ICD-10-PCS | Mod: CPTII,S$GLB,, | Performed by: FAMILY MEDICINE

## 2023-06-02 PROCEDURE — 99214 PR OFFICE/OUTPT VISIT, EST, LEVL IV, 30-39 MIN: ICD-10-PCS | Mod: S$GLB,,, | Performed by: FAMILY MEDICINE

## 2023-06-02 PROCEDURE — 1160F RVW MEDS BY RX/DR IN RCRD: CPT | Mod: CPTII,S$GLB,, | Performed by: FAMILY MEDICINE

## 2023-06-02 PROCEDURE — 1159F PR MEDICATION LIST DOCUMENTED IN MEDICAL RECORD: ICD-10-PCS | Mod: CPTII,S$GLB,, | Performed by: FAMILY MEDICINE

## 2023-06-02 PROCEDURE — 3075F PR MOST RECENT SYSTOLIC BLOOD PRESS GE 130-139MM HG: ICD-10-PCS | Mod: CPTII,S$GLB,, | Performed by: FAMILY MEDICINE

## 2023-06-02 PROCEDURE — 3079F PR MOST RECENT DIASTOLIC BLOOD PRESSURE 80-89 MM HG: ICD-10-PCS | Mod: CPTII,S$GLB,, | Performed by: FAMILY MEDICINE

## 2023-06-02 PROCEDURE — 1159F MED LIST DOCD IN RCRD: CPT | Mod: CPTII,S$GLB,, | Performed by: FAMILY MEDICINE

## 2023-06-02 PROCEDURE — 3079F DIAST BP 80-89 MM HG: CPT | Mod: CPTII,S$GLB,, | Performed by: FAMILY MEDICINE

## 2023-06-02 PROCEDURE — 1160F PR REVIEW ALL MEDS BY PRESCRIBER/CLIN PHARMACIST DOCUMENTED: ICD-10-PCS | Mod: CPTII,S$GLB,, | Performed by: FAMILY MEDICINE

## 2023-06-02 PROCEDURE — 99999 PR PBB SHADOW E&M-EST. PATIENT-LVL V: CPT | Mod: PBBFAC,,, | Performed by: FAMILY MEDICINE

## 2023-06-02 PROCEDURE — 99214 OFFICE O/P EST MOD 30 MIN: CPT | Mod: S$GLB,,, | Performed by: FAMILY MEDICINE

## 2023-06-02 PROCEDURE — 99999 PR PBB SHADOW E&M-EST. PATIENT-LVL V: ICD-10-PCS | Mod: PBBFAC,,, | Performed by: FAMILY MEDICINE

## 2023-06-02 RX ORDER — HYDROCODONE BITARTRATE AND ACETAMINOPHEN 10; 325 MG/1; MG/1
1 TABLET ORAL EVERY 8 HOURS PRN
Qty: 90 TABLET | Refills: 0 | Status: SHIPPED | OUTPATIENT
Start: 2023-06-02 | End: 2023-09-12 | Stop reason: SDUPTHER

## 2023-06-02 RX ORDER — MINOCYCLINE HYDROCHLORIDE 100 MG/1
100 CAPSULE ORAL 2 TIMES DAILY
Qty: 20 CAPSULE | Refills: 0 | Status: SHIPPED | OUTPATIENT
Start: 2023-06-02 | End: 2023-06-12

## 2023-06-02 NOTE — PROGRESS NOTES
"Subjective:       Patient ID: Mina Zelaya is a 57 y.o. male.    Chief Complaint: Hip Pain (Bilateral./)    Complains of gradually worsening bilateral hip pain, R>L. No recent falls or injury. No numbness or weakness. Pain after prolonged sitting, feels better to stand  Recurrent pustular folliculitis, flared up when stopped using Hibiclens regularly  Prediabetes/obesity - interested in semaglutide. No FHx of thyroid CA    Review of Systems   Constitutional:  Negative for chills and fever.   Respiratory:  Positive for shortness of breath. Cough: chronic.   Musculoskeletal:  Positive for arthralgias and back pain.   Skin:  Positive for rash.   Neurological:  Negative for weakness and numbness.   Psychiatric/Behavioral:  Negative for agitation and confusion.      Objective:      Vitals:    06/02/23 0848   BP: 132/82   BP Location: Right arm   Patient Position: Sitting   BP Method: Medium (Manual)   Pulse: 75   Resp: 18   Temp: 97.2 °F (36.2 °C)   TempSrc: Temporal   SpO2: 97%   Weight: 117.3 kg (258 lb 7.8 oz)   Height: 5' 10" (1.778 m)     BP Readings from Last 5 Encounters:   06/02/23 132/82   05/31/23 133/69   02/14/23 124/66   11/15/22 124/68   10/03/22 126/65     Wt Readings from Last 5 Encounters:   06/02/23 117.3 kg (258 lb 7.8 oz)   05/31/23 117.4 kg (258 lb 14.9 oz)   02/14/23 117.2 kg (258 lb 6.1 oz)   11/15/22 116.7 kg (257 lb 6.2 oz)   10/03/22 118.8 kg (261 lb 12.7 oz)     Physical Exam  Vitals and nursing note reviewed.   Constitutional:       General: He is not in acute distress.     Appearance: Normal appearance. He is well-developed.   HENT:      Head: Normocephalic and atraumatic.   Cardiovascular:      Rate and Rhythm: Normal rate and regular rhythm.      Heart sounds: Normal heart sounds.   Pulmonary:      Effort: Pulmonary effort is normal.      Breath sounds: Normal breath sounds.   Musculoskeletal:      Right hip: No bony tenderness.      Left hip: No bony tenderness.      Right lower leg: No " edema.      Left lower leg: No edema.   Skin:     General: Skin is warm and dry.      Findings: Rash present. Rash is pustular.   Neurological:      Mental Status: He is alert and oriented to person, place, and time.   Psychiatric:         Mood and Affect: Mood normal.         Behavior: Behavior normal.       Lab Results   Component Value Date    WBC 6.94 11/28/2022    HGB 12.2 (L) 11/28/2022    HCT 38.7 (L) 11/28/2022     11/28/2022    CHOL 201 (H) 11/28/2022    TRIG 83 11/28/2022    HDL 53 11/28/2022    ALT 19 11/28/2022    AST 29 11/28/2022     11/28/2022    K 4.0 11/28/2022     11/28/2022    CREATININE 0.9 11/28/2022    BUN 18 11/28/2022    CO2 28 11/28/2022    TSH 1.452 11/28/2022    PSA 0.77 11/28/2022    INR 1.0 07/14/2020    HGBA1C 6.1 (H) 11/28/2022      Assessment:       1. Bilateral hip pain    2. Borderline diabetes    3. Pustular folliculitis    4. DDD (degenerative disc disease), lumbar        Plan:       Bilateral hip pain  -     Ambulatory referral/consult to Orthopedics; Future; Expected date: 06/09/2023  -     X-Ray Hip 3 or 4 views Bilateral; Future; Expected date: 06/02/2023    Borderline diabetes  -     Comprehensive Metabolic Panel; Future; Expected date: 06/02/2023  -     Hemoglobin A1C; Future; Expected date: 06/02/2023  Limit carb intake  Pustular folliculitis  -     minocycline (MINOCIN,DYNACIN) 100 MG capsule; Take 1 capsule (100 mg total) by mouth 2 (two) times daily. for 10 days  Dispense: 20 capsule; Refill: 0    DDD (degenerative disc disease), lumbar  -     HYDROcodone-acetaminophen (NORCO)  mg per tablet; Take 1 tablet by mouth every 8 (eight) hours as needed for Pain.  Dispense: 90 tablet; Refill: 0  Advised to use meds sparingly    Medication List with Changes/Refills   New Medications    MINOCYCLINE (MINOCIN,DYNACIN) 100 MG CAPSULE    Take 1 capsule (100 mg total) by mouth 2 (two) times daily. for 10 days   Current Medications    ALBUTEROL  (PROVENTIL/VENTOLIN HFA) 90 MCG/ACTUATION INHALER    2 puffs every 4 hours as needed for cough, wheeze, or shortness of breath    ALBUTEROL-IPRATROPIUM (DUO-NEB) 2.5 MG-0.5 MG/3 ML NEBULIZER SOLUTION    Take 3 mLs by nebulization every 6 (six) hours as needed for Wheezing or Shortness of Breath. Rescue    ALLOPURINOL (ZYLOPRIM) 100 MG TABLET    TAKE 1 TABLET EVERY DAY    ALLOPURINOL (ZYLOPRIM) 300 MG TABLET    TAKE 1 TABLET EVERY DAY    ASCORBIC ACID, VITAMIN C, (VITAMIN C) 1000 MG TABLET    Take 1,000 mg by mouth once daily.    ASPIRIN (ECOTRIN) 81 MG EC TABLET    Take 81 mg by mouth once daily.    BUMETANIDE (BUMEX) 2 MG TABLET    TAKE 1 TABLET EVERY DAY    CALCIUM CARBONATE (OS-VÍCTOR) 600 MG (1,500 MG) TAB    Take 600 mg by mouth once daily.    CHLORHEXIDINE (HIBICLENS) 4 % EXTERNAL LIQUID    WASH DAILY FOR 3 DAYS THEN 3 DAYS PER WEEK FOR 2 WEEKS- REPEAT AS NEEDED    CYANOCOBALAMIN (VITAMIN B-12) 100 MCG TABLET    Take 100 mcg by mouth once daily.    FISH OIL-OMEGA-3 FATTY ACIDS 300-1,000 MG CAPSULE    Take 1,200 mg by mouth once daily.     MG TABLET    TAKE 1 TABLET THREE TIMES DAILY    LORATADINE (CLARITIN) 10 MG TABLET    Take 10 mg by mouth once daily.    METFORMIN (GLUCOPHAGE-XR) 500 MG ER 24HR TABLET    TAKE 1 TABLET EVERY MORNING  WITH  BREAKFAST    MONTELUKAST (SINGULAIR) 10 MG TABLET    TAKE 1 TABLET EVERY EVENING    MUPIROCIN (BACTROBAN) 2 % OINTMENT    Apply topically 3 (three) times daily.    POTASSIUM CHLORIDE SA (K-DUR,KLOR-CON) 20 MEQ TABLET    TAKE 2 TABLETS TWICE A DAY    ROSUVASTATIN (CRESTOR) 5 MG TABLET    TAKE 1 TABLET EVERY DAY    TIOTROPIUM-OLODATEROL (STIOLTO RESPIMAT) 2.5-2.5 MCG/ACTUATION MIST    INHALE 2 PUFFS INTO THE LUNGS EVERY DAY    TOFACITINIB (XELJANZ XR) 11 MG TB24    Take 11 mg by mouth once daily.    TRAZODONE (DESYREL) 100 MG TABLET    TAKE 1 TABLET AT BEDTIME    VENTOLIN HFA 90 MCG/ACTUATION INHALER    Inhale 1 puff into the lungs 4 (four) times daily as needed.    Changed and/or Refilled Medications    Modified Medication Previous Medication    HYDROCODONE-ACETAMINOPHEN (NORCO)  MG PER TABLET HYDROcodone-acetaminophen (NORCO)  mg per tablet       Take 1 tablet by mouth every 8 (eight) hours as needed for Pain.    Take 1 tablet by mouth every 8 (eight) hours as needed for Pain.   Discontinued Medications    TIOTROPIUM-OLODATEROL (STIOLTO RESPIMAT) 2.5-2.5 MCG/ACTUATION MIST    Inhale 2 puffs into the lungs once daily. Controller

## 2023-06-07 ENCOUNTER — TELEPHONE (OUTPATIENT)
Dept: PRIMARY CARE CLINIC | Facility: CLINIC | Age: 58
End: 2023-06-07
Payer: MEDICARE

## 2023-06-07 NOTE — TELEPHONE ENCOUNTER
Informed Michelle I will contact [facility] authorization department regarding the MRI.

## 2023-06-07 NOTE — TELEPHONE ENCOUNTER
----- Message from Tabby Baker sent at 6/7/2023  2:32 PM CDT -----  Contact: Michelle [phone]  Michelle from [facility] in regards to bilateral of hip will require an aut from [facility].    Fax [phone]    Please call and advise

## 2023-06-08 ENCOUNTER — TELEPHONE (OUTPATIENT)
Dept: PULMONOLOGY | Facility: CLINIC | Age: 58
End: 2023-06-08
Payer: MEDICARE

## 2023-06-08 NOTE — TELEPHONE ENCOUNTER
Patient has been denied for NIV upon renewal. Patient is waiting for paperwork and will contact office to follow up to get appeal started.     ----- Message from Claudette Rodriguez sent at 6/8/2023  8:58 AM CDT -----  Regarding: advice  Contact: Shivani STACK with [employer]  Type: Needs Medical Advice  Who Called:  Shivani STACK with [employer]  Symptoms (please be specific):    How long has patient had these symptoms:    Pharmacy name and phone #:    Best Call Back Number: [phone]  Additional Information: Shivani states she received a call from the patient's insurance and his ventilator was denied. She needs to speak to the nurse to let her know what needs to get it appealed. Please call Shivani.Thanks!

## 2023-06-22 ENCOUNTER — TELEPHONE (OUTPATIENT)
Dept: PRIMARY CARE CLINIC | Facility: CLINIC | Age: 58
End: 2023-06-22

## 2023-06-22 NOTE — TELEPHONE ENCOUNTER
----- Message from Gracie Lara sent at 6/22/2023  4:23 PM CDT -----  [facility] [place] on [facility] needs authorization from his insurance for his MRI of right and left hip they said that there npi # is [id number] . They need this before MRI can be done

## 2023-06-29 ENCOUNTER — OFFICE VISIT (OUTPATIENT)
Dept: ORTHOPEDICS | Facility: CLINIC | Age: 58
End: 2023-06-29
Payer: MEDICARE

## 2023-06-29 VITALS
HEIGHT: 70 IN | HEART RATE: 65 BPM | BODY MASS INDEX: 36.31 KG/M2 | DIASTOLIC BLOOD PRESSURE: 57 MMHG | WEIGHT: 253.63 LBS | SYSTOLIC BLOOD PRESSURE: 106 MMHG

## 2023-06-29 DIAGNOSIS — M87.052 AVASCULAR NECROSIS OF BONE OF LEFT HIP: ICD-10-CM

## 2023-06-29 DIAGNOSIS — M25.552 BILATERAL HIP PAIN: ICD-10-CM

## 2023-06-29 DIAGNOSIS — M87.051 AVASCULAR NECROSIS OF BONE OF HIP, RIGHT: Primary | ICD-10-CM

## 2023-06-29 DIAGNOSIS — M25.551 BILATERAL HIP PAIN: ICD-10-CM

## 2023-06-29 PROCEDURE — 99499 UNLISTED E&M SERVICE: CPT | Mod: HCNC,S$GLB,,

## 2023-06-29 PROCEDURE — 3074F PR MOST RECENT SYSTOLIC BLOOD PRESSURE < 130 MM HG: ICD-10-PCS | Mod: CPTII,S$GLB,,

## 2023-06-29 PROCEDURE — 1159F PR MEDICATION LIST DOCUMENTED IN MEDICAL RECORD: ICD-10-PCS | Mod: CPTII,S$GLB,,

## 2023-06-29 PROCEDURE — 99499 RISK ADDL DX/OHS AUDIT: ICD-10-PCS | Mod: HCNC,S$GLB,,

## 2023-06-29 PROCEDURE — 3008F PR BODY MASS INDEX (BMI) DOCUMENTED: ICD-10-PCS | Mod: CPTII,S$GLB,,

## 2023-06-29 PROCEDURE — 99204 OFFICE O/P NEW MOD 45 MIN: CPT | Mod: S$GLB,,,

## 2023-06-29 PROCEDURE — 99999 PR PBB SHADOW E&M-EST. PATIENT-LVL V: ICD-10-PCS | Mod: PBBFAC,,,

## 2023-06-29 PROCEDURE — 3008F BODY MASS INDEX DOCD: CPT | Mod: CPTII,S$GLB,,

## 2023-06-29 PROCEDURE — 3078F DIAST BP <80 MM HG: CPT | Mod: CPTII,S$GLB,,

## 2023-06-29 PROCEDURE — 1159F MED LIST DOCD IN RCRD: CPT | Mod: CPTII,S$GLB,,

## 2023-06-29 PROCEDURE — 3078F PR MOST RECENT DIASTOLIC BLOOD PRESSURE < 80 MM HG: ICD-10-PCS | Mod: CPTII,S$GLB,,

## 2023-06-29 PROCEDURE — 3044F PR MOST RECENT HEMOGLOBIN A1C LEVEL <7.0%: ICD-10-PCS | Mod: CPTII,S$GLB,,

## 2023-06-29 PROCEDURE — 99204 PR OFFICE/OUTPT VISIT, NEW, LEVL IV, 45-59 MIN: ICD-10-PCS | Mod: S$GLB,,,

## 2023-06-29 PROCEDURE — 99999 PR PBB SHADOW E&M-EST. PATIENT-LVL V: CPT | Mod: PBBFAC,,,

## 2023-06-29 PROCEDURE — 3074F SYST BP LT 130 MM HG: CPT | Mod: CPTII,S$GLB,,

## 2023-06-29 PROCEDURE — 3044F HG A1C LEVEL LT 7.0%: CPT | Mod: CPTII,S$GLB,,

## 2023-06-29 NOTE — PROGRESS NOTES
Patient ID: Mina Zelaya is a 57 y.o. male    Pain of the Left Hip and Pain of the Right Hip      History of Present Illness:    Mina Zelaya presents to clinic for bilateral hip pain, R > L. Patient denies known FUAD, but does state he fell of a ladder years ago. The pain started  years ago and is becoming progressively worse.  Pain is located over (points to) groin bilateral hip. He reports that the pain is a 3 /10 sharp and sore pain toda. The pain is affecting ADLs and limiting desired level of activity. Denies numbness, tingling, radiation and inability to bear weight.  Pain is 10 /10 at its worst.     Trial of Norco and ibuprofen with no improvement. 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     Occupation: retired    Ambulating: unassisted  Diabetic: +   Lab Results   Component Value Date    LABA1C 5.8 08/06/2020    HGBA1C 5.8 (H) 06/02/2023       Smoking: no  Hx of DVT/PE: no    PAST MEDICAL HISTORY:   Past Medical History:   Diagnosis Date    Arthritis     Asthma     Chronic bronchitis     Emphysema of lung     Heavy alcohol consumption 10/1/2018    Type 2 diabetes mellitus with hyperlipidemia 1/25/2018     PAST SURGICAL HISTORY: No past surgical history on file.  FAMILY HISTORY:   Family History   Problem Relation Age of Onset    Stroke Mother     No Known Problems Father     Pneumonia Sister     Stroke Brother     No Known Problems Maternal Aunt     Stroke Paternal Aunt     Cancer Paternal Uncle     Cancer Maternal Grandmother     Heart failure Maternal Grandfather     Stroke Paternal Grandmother     No Known Problems Paternal Grandfather     Stroke Brother     No Known Problems Brother     No Known Problems Brother      SOCIAL HISTORY:   Social History     Occupational History    Not on file   Tobacco Use    Smoking status: Former     Packs/day: 1.00     Years: 15.00     Pack years: 15.00     Types: Cigarettes      Quit date: 2003     Years since quittin.0    Smokeless tobacco: Never   Substance and Sexual Activity    Alcohol use: Yes     Alcohol/week: 25.0 standard drinks     Types: 25 Cans of beer per week    Drug use: No    Sexual activity: Not Currently     Partners: Female        MEDICATIONS:   Current Outpatient Medications:     albuterol (PROVENTIL/VENTOLIN HFA) 90 mcg/actuation inhaler, 2 puffs every 4 hours as needed for cough, wheeze, or shortness of breath, Disp: 18 g, Rfl: 3    allopurinoL (ZYLOPRIM) 100 MG tablet, TAKE 1 TABLET EVERY DAY, Disp: 90 tablet, Rfl: 3    allopurinoL (ZYLOPRIM) 300 MG tablet, TAKE 1 TABLET EVERY DAY, Disp: 90 tablet, Rfl: 3    ascorbic acid, vitamin C, (VITAMIN C) 1000 MG tablet, Take 1,000 mg by mouth once daily., Disp: , Rfl:     aspirin (ECOTRIN) 81 MG EC tablet, Take 81 mg by mouth once daily., Disp: , Rfl:     bumetanide (BUMEX) 2 MG tablet, TAKE 1 TABLET EVERY DAY, Disp: 90 tablet, Rfl: 2    calcium carbonate (OS-VÍCTOR) 600 mg (1,500 mg) Tab, Take 600 mg by mouth once daily., Disp: , Rfl:     chlorhexidine (HIBICLENS) 4 % external liquid, WASH DAILY FOR 3 DAYS THEN 3 DAYS PER WEEK FOR 2 WEEKS- REPEAT AS NEEDED, Disp: 236 mL, Rfl: 1    cyanocobalamin (VITAMIN B-12) 100 MCG tablet, Take 100 mcg by mouth once daily., Disp: , Rfl:     fish oil-omega-3 fatty acids 300-1,000 mg capsule, Take 1,200 mg by mouth once daily., Disp: , Rfl:     HYDROcodone-acetaminophen (NORCO)  mg per tablet, Take 1 tablet by mouth every 8 (eight) hours as needed for Pain., Disp: 90 tablet, Rfl: 0     mg tablet, TAKE 1 TABLET THREE TIMES DAILY, Disp: 270 tablet, Rfl: 1    loratadine (CLARITIN) 10 mg tablet, Take 10 mg by mouth once daily., Disp: , Rfl:     metFORMIN (GLUCOPHAGE-XR) 500 MG ER 24hr tablet, TAKE 1 TABLET EVERY MORNING  WITH  BREAKFAST, Disp: 90 tablet, Rfl: 1    montelukast (SINGULAIR) 10 mg tablet, TAKE 1 TABLET EVERY EVENING, Disp: 90 tablet, Rfl: 2    mupirocin (BACTROBAN)  2 % ointment, Apply topically 3 (three) times daily., Disp: , Rfl:     potassium chloride SA (K-DUR,KLOR-CON) 20 MEQ tablet, TAKE 2 TABLETS TWICE A DAY, Disp: 360 tablet, Rfl: 3    rosuvastatin (CRESTOR) 5 MG tablet, TAKE 1 TABLET EVERY DAY, Disp: 90 tablet, Rfl: 2    tiotropium-olodateroL (STIOLTO RESPIMAT) 2.5-2.5 mcg/actuation Mist, INHALE 2 PUFFS INTO THE LUNGS EVERY DAY, Disp: 24 g, Rfl: 3    tofacitinib (XELJANZ XR) 11 mg Tb24, Take 11 mg by mouth once daily., Disp: , Rfl:     traZODone (DESYREL) 100 MG tablet, TAKE 1 TABLET AT BEDTIME, Disp: 90 tablet, Rfl: 3    VENTOLIN HFA 90 mcg/actuation inhaler, Inhale 1 puff into the lungs 4 (four) times daily as needed., Disp: 18 g, Rfl: 11    albuterol-ipratropium (DUO-NEB) 2.5 mg-0.5 mg/3 mL nebulizer solution, Take 3 mLs by nebulization every 6 (six) hours as needed for Wheezing or Shortness of Breath. Rescue, Disp: 120 vial, Rfl: 5  ALLERGIES:   Review of patient's allergies indicates:   Allergen Reactions    Atorvastatin      myalgia    Sulfa (sulfonamide antibiotics) Rash         Physical Exam     Vitals:    06/29/23 0812   BP: (!) 106/57   Pulse: 65     Alert and oriented to person, place and time. No acute distress. Well-groomed, not ill appearing. Pupils round and reactive, normal respiratory effort, no audible wheezing.       General:  The patient is alert and oriented x 3.  Mood is pleasant.      bilateral HIP EXAMINATION     OBSERVATION / INSPECTION  Gait:   antalgic   Alignment:  Neutral   Scars:   None   Muscle atrophy: None   Effusion:  None   Warmth:  None   Leg lengths:   Equal     TENDERNESS         Trochanteric bursa   -   Piriformis    -   SI joint    -   Psoas tendon   -   Rectus insertion  -   Adductor insertion  -   Pubic symphysis  -     ROM: (* = pain)    Flexion:    100 degrees *  External rotation: 20 degrees *  Internal rotation with axial load: 30 degrees  Internal rotation without axial load: 40 degrees  Abduction:  30  degrees  Adduction:   10 * degrees    SPECIAL TESTS:  Pain w/ forced internal rotation (FADIR): +   Pain w/ forced external rotation (KAT): +  Circumduction test:    Negative   Stinchfield test:    +   Log roll:      +  Snapping hip (internal):   Negative   Sit-up pain:     Negative   Resisted sit-up pain:    +  Trendelenburg test:    Negative       EXTREMITY NEURO-VASCULAR EXAMINATION:   Sensation:  Grossly intact to light touch all dermatomal regions.   Motor Function:  Fully intact motor function at hip, knee, foot and ankle    DTRs;  quadriceps and  achilles 2+.  No clonus and downgoing Babinski.    Vascular status:  DP and PT pulses 2+, brisk capillary refill, symmetric.    Skin: intact, compartments soft.    Imaging:     Bilateral hip x-rays reviewed by me showing no evidence of acute fracture or dislocation.  There is remodeling of bilateral femoral heads, right greater than left concerning for avascular necrosis.      Assessment & Plan    Avascular necrosis of bone of hip, right    Bilateral hip pain  -     Ambulatory referral/consult to Orthopedics  -     MRI Hip Without Contrast Right; Future; Expected date: 06/29/2023  -     MRI Hip Without Contrast Left; Future; Expected date: 06/29/2023    Avascular necrosis of bone of left hip         I made the decision to obtain old records of the patient including previous notes and imaging. New imaging was ordered today of the extremity or extremities evaluated. I independently reviewed and interpreted the radiographs and/or MRIs/CT scan today as well as prior imaging.    We discussed at length different treatment options including conservative vs surgical management. These include anti-inflammatories, acetaminophen, rest, ice, heat, formal physical therapy including strengthening and stretching exercises, home exercise programs, injections, dry needling, and finally surgical intervention.      Patient presents to clinic for bilateral hip pain.  X-rays  concerning for avascular necrosis of bilateral hips however right more worrisome.  We will proceed today with MRIs of bilateral hips.  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  We will fax over MRI orders to [facility] [place].  Pending results consider referral to [facility] as [facility] may not be able to accommodate intraoperatively due to patient's history.    Follow up: MRI results  X-rays next visit: none    All questions were answered and patient is agreeable to the above plan.

## 2023-06-29 NOTE — PATIENT INSTRUCTIONS
Avascular necrosis    [facility]    [phone] [address] [place], [address]

## 2023-07-03 ENCOUNTER — TELEPHONE (OUTPATIENT)
Dept: PRIMARY CARE CLINIC | Facility: CLINIC | Age: 58
End: 2023-07-03
Payer: MEDICARE

## 2023-07-03 NOTE — TELEPHONE ENCOUNTER
----- Message from Tabby Baker sent at 7/3/2023  9:46 AM CDT -----  Contact: savage [phone]  Savage from [facility] bilateral hip  requires prior aut from [employer] stated do not need orders need prior aut.    Fax [phone]    Please call and advise

## 2023-07-03 NOTE — TELEPHONE ENCOUNTER
[facility] Authorization Department has been notified of prior authorization.

## 2023-07-05 ENCOUNTER — TELEPHONE (OUTPATIENT)
Dept: PRIMARY CARE CLINIC | Facility: CLINIC | Age: 58
End: 2023-07-05
Payer: MEDICARE

## 2023-07-05 NOTE — TELEPHONE ENCOUNTER
----- Message from Tahira Flores sent at 7/5/2023  8:02 AM CDT -----  Contact: [phone]  Pt Is calling for Gracie he states he was suppose to call you today please advise

## 2023-07-25 DIAGNOSIS — R09.89 CHRONIC SINUS COMPLAINTS: ICD-10-CM

## 2023-07-25 DIAGNOSIS — J45.40 MODERATE PERSISTENT ASTHMA, UNCOMPLICATED: ICD-10-CM

## 2023-07-25 DIAGNOSIS — J44.9 COPD MIXED TYPE: ICD-10-CM

## 2023-07-25 DIAGNOSIS — R73.03 BORDERLINE DIABETES: ICD-10-CM

## 2023-07-25 RX ORDER — MONTELUKAST SODIUM 10 MG/1
TABLET ORAL
Qty: 90 TABLET | Refills: 3 | Status: SHIPPED | OUTPATIENT
Start: 2023-07-25

## 2023-07-25 RX ORDER — METFORMIN HYDROCHLORIDE 500 MG/1
TABLET, EXTENDED RELEASE ORAL
Qty: 90 TABLET | Refills: 1 | Status: SHIPPED | OUTPATIENT
Start: 2023-07-25

## 2023-07-25 NOTE — TELEPHONE ENCOUNTER
Provider Staff:  Action required for this patient     Please see care gap opportunities below in Care Due Message: Uric acid outdated    Thanks!  [facility]     Appointments      Date Provider   Last Visit   6/2/2023 Otto Bruce MD   Next Visit   Visit date not found Otto Bruce MD     Refill Decision Note   Mina Zelaya  is requesting a refill authorization.  Brief Assessment and Rationale for Refill:  Approve     Medication Therapy Plan:         Comments:     Note composed:5:18 PM 07/25/2023

## 2023-07-25 NOTE — TELEPHONE ENCOUNTER
Care Due:                  Date            Visit Type   Department     Provider  --------------------------------------------------------------------------------                                EP -                              PRIMARY      [facility]  Last Visit: 06-      CARE ([facility])   PRIMARY CARE   Otto Bruce  Next Visit: None Scheduled  None         None Found                                                            Last  Test          Frequency    Reason                     Performed    Due Date  --------------------------------------------------------------------------------    Uric Acid...  12 months..  allopurinoL..............  Not Found    Overdue    [employer] Embedded Care Due Messages. Reference number: [id number].   7/25/2023 4:30:51 PM CDT

## 2023-07-27 ENCOUNTER — TELEPHONE (OUTPATIENT)
Dept: PRIMARY CARE CLINIC | Facility: CLINIC | Age: 58
End: 2023-07-27
Payer: MEDICARE

## 2023-07-27 NOTE — TELEPHONE ENCOUNTER
Called pt regarding message. Pt stated he was unable to do MRI because he had difficulty breathing while laying down. Pt requested recommendations on an alternative option.

## 2023-07-27 NOTE — TELEPHONE ENCOUNTER
----- Message from Milvia Thomas sent at 7/27/2023 10:50 AM CDT -----  Calling because he can not lay down to do the MRI's. Please advise. Thanks.

## 2023-07-28 NOTE — TELEPHONE ENCOUNTER
----- Message from Ivett Horton sent at 7/28/2023  9:06 AM CDT -----  Contact: Mobile [phone]  Patient said that he was not able to do an MRI and he would like to speak with you about this.

## 2023-08-04 NOTE — TELEPHONE ENCOUNTER
Called pt regarding message. Pt stated Yane Dupree PA-C staff called to inform him she order a new MRI. Verbalized understanding,

## 2023-08-15 ENCOUNTER — TELEPHONE (OUTPATIENT)
Dept: PRIMARY CARE CLINIC | Facility: CLINIC | Age: 58
End: 2023-08-15

## 2023-08-15 RX ORDER — MINOCYCLINE HYDROCHLORIDE 100 MG/1
100 CAPSULE ORAL 2 TIMES DAILY
Qty: 20 CAPSULE | Refills: 0 | Status: SHIPPED | OUTPATIENT
Start: 2023-08-15 | End: 2023-08-25

## 2023-08-15 NOTE — TELEPHONE ENCOUNTER
----- Message from Milvia Thomas sent at 8/15/2023 11:24 AM CDT -----  Contact: Patient, [phone]  [email or web address]     Patient is calling for Medical Advice regarding:  Bumps all over the place    How long has patient had these symptoms:  A couple of weeks    Pharmacy name and phone#:    [id number] - [place] - [address] AT [facility] [address]  [address] [place] [address]  Phone: [phone] Fax: [phone]       Would like response via [employer]:  Call back    Comments:  Calling to request the antibiotic that makes them go away Please call him. Thanks.

## 2023-08-15 NOTE — TELEPHONE ENCOUNTER
Called pt regarding message. Pt stated he has raised bumps on his head,back and chest. Pt stated they are painful. No blisters, itching, or drainage reported. Pt requested medication.

## 2023-08-15 NOTE — TELEPHONE ENCOUNTER
Called pt regarding provider's message. Pt verbalized understanding. Pt stated he also hasn't heard back from Yane Dupree PA-C office regarding the MRI.

## 2023-08-16 DIAGNOSIS — M87.051 AVASCULAR NECROSIS OF BONE OF HIP, RIGHT: Primary | ICD-10-CM

## 2023-08-16 NOTE — PROGRESS NOTES
Spoke with patient via phone.  Patient stated the external MRI wanted him to lay flat for his MRI.  He is unable to do this due to paralysis of his diaphragm and stomach while lying flat.  I instructed him that [facility] may be able to possibly accommodate him without MRI.  Order placed for referral to [facility].

## 2023-08-17 ENCOUNTER — OFFICE VISIT (OUTPATIENT)
Dept: ORTHOPEDICS | Facility: CLINIC | Age: 58
End: 2023-08-17
Payer: MEDICARE

## 2023-08-17 ENCOUNTER — HOSPITAL ENCOUNTER (OUTPATIENT)
Dept: RADIOLOGY | Facility: HOSPITAL | Age: 58
Discharge: HOME | End: 2023-08-17
Attending: ORTHOPAEDIC SURGERY
Payer: MEDICARE

## 2023-08-17 VITALS — WEIGHT: 257.25 LBS | HEIGHT: 70 IN | BODY MASS INDEX: 36.83 KG/M2

## 2023-08-17 DIAGNOSIS — M25.551 BILATERAL HIP PAIN: Primary | ICD-10-CM

## 2023-08-17 DIAGNOSIS — M25.552 BILATERAL HIP PAIN: Primary | ICD-10-CM

## 2023-08-17 DIAGNOSIS — M25.552 BILATERAL HIP PAIN: ICD-10-CM

## 2023-08-17 DIAGNOSIS — M25.551 BILATERAL HIP PAIN: ICD-10-CM

## 2023-08-17 DIAGNOSIS — M87.051 AVASCULAR NECROSIS OF BONE OF HIP, RIGHT: ICD-10-CM

## 2023-08-17 PROCEDURE — 3044F HG A1C LEVEL LT 7.0%: CPT | Mod: HCNC,CPTII,S$GLB, | Performed by: ORTHOPAEDIC SURGERY

## 2023-08-17 PROCEDURE — 99213 PR OFFICE/OUTPT VISIT, EST, LEVL III, 20-29 MIN: ICD-10-PCS | Mod: HCNC,S$GLB,, | Performed by: ORTHOPAEDIC SURGERY

## 2023-08-17 PROCEDURE — 99999 PR PBB SHADOW E&M-EST. PATIENT-LVL V: ICD-10-PCS | Mod: PBBFAC,HCNC,, | Performed by: ORTHOPAEDIC SURGERY

## 2023-08-17 PROCEDURE — 1160F PR REVIEW ALL MEDS BY PRESCRIBER/CLIN PHARMACIST DOCUMENTED: ICD-10-PCS | Mod: HCNC,CPTII,S$GLB, | Performed by: ORTHOPAEDIC SURGERY

## 2023-08-17 PROCEDURE — 3008F PR BODY MASS INDEX (BMI) DOCUMENTED: ICD-10-PCS | Mod: HCNC,CPTII,S$GLB, | Performed by: ORTHOPAEDIC SURGERY

## 2023-08-17 PROCEDURE — 99213 OFFICE O/P EST LOW 20 MIN: CPT | Mod: HCNC,S$GLB,, | Performed by: ORTHOPAEDIC SURGERY

## 2023-08-17 PROCEDURE — 72170 XR PELVIS ROUTINE AP: ICD-10-PCS | Mod: 26,HCNC,, | Performed by: RADIOLOGY

## 2023-08-17 PROCEDURE — 1159F MED LIST DOCD IN RCRD: CPT | Mod: HCNC,CPTII,S$GLB, | Performed by: ORTHOPAEDIC SURGERY

## 2023-08-17 PROCEDURE — 72170 X-RAY EXAM OF PELVIS: CPT | Mod: 26,HCNC,, | Performed by: RADIOLOGY

## 2023-08-17 PROCEDURE — 72170 X-RAY EXAM OF PELVIS: CPT | Mod: TC,HCNC

## 2023-08-17 PROCEDURE — 3008F BODY MASS INDEX DOCD: CPT | Mod: HCNC,CPTII,S$GLB, | Performed by: ORTHOPAEDIC SURGERY

## 2023-08-17 PROCEDURE — 1160F RVW MEDS BY RX/DR IN RCRD: CPT | Mod: HCNC,CPTII,S$GLB, | Performed by: ORTHOPAEDIC SURGERY

## 2023-08-17 PROCEDURE — 1159F PR MEDICATION LIST DOCUMENTED IN MEDICAL RECORD: ICD-10-PCS | Mod: HCNC,CPTII,S$GLB, | Performed by: ORTHOPAEDIC SURGERY

## 2023-08-17 PROCEDURE — 99999 PR PBB SHADOW E&M-EST. PATIENT-LVL V: CPT | Mod: PBBFAC,HCNC,, | Performed by: ORTHOPAEDIC SURGERY

## 2023-08-17 PROCEDURE — 3044F PR MOST RECENT HEMOGLOBIN A1C LEVEL <7.0%: ICD-10-PCS | Mod: HCNC,CPTII,S$GLB, | Performed by: ORTHOPAEDIC SURGERY

## 2023-08-17 NOTE — PROGRESS NOTES
Subjective:   Mina Zelaya is a 57 y.o. male who presents for evaluation of bilateral hip pain (R>L) that has been present for years.  Patient describes pain as burning and located at bilateral hips; worsened with activity.  States his pain is alleviated with hydrocodone, but he is not tried any injections and has never seen physical therapy.  Ambulates without assistance at baseline.        Medications: hydrocodone    Injections: never    Physical Therapy: never    Bracing: never    Assistive Devices: none    Walking:   < 2 blocks                 Past Medical History:   Diagnosis Date    Arthritis     Asthma     Chronic bronchitis     Emphysema of lung     Heavy alcohol consumption 10/1/2018    Type 2 diabetes mellitus with hyperlipidemia 2018       No past surgical history on file.    Family History   Problem Relation Age of Onset    Stroke Mother     No Known Problems Father     Pneumonia Sister     Stroke Brother     No Known Problems Maternal Aunt     Stroke Paternal Aunt     Cancer Paternal Uncle     Cancer Maternal Grandmother     Heart failure Maternal Grandfather     Stroke Paternal Grandmother     No Known Problems Paternal Grandfather     Stroke Brother     No Known Problems Brother     No Known Problems Brother        Social History     Socioeconomic History    Marital status: Single   Tobacco Use    Smoking status: Former     Current packs/day: 0.00     Average packs/day: 1 pack/day for 15.0 years (15.0 ttl pk-yrs)     Types: Cigarettes     Start date: 1988     Quit date: 2003     Years since quittin.2    Smokeless tobacco: Never   Substance and Sexual Activity    Alcohol use: Yes     Alcohol/week: 25.0 standard drinks of alcohol     Types: 25 Cans of beer per week    Drug use: No    Sexual activity: Not Currently     Partners: Female       ROS:  A review of systems is updated and there are no reported vision changes, ear/nose/mouth/throat complaints,  chest pain, shortness of  "breath, abdominal pain, urological complaints, fevers or chills, psychiatric complaints. Musculoskeletal and neurologcial symptoms are as documented. All other systems are negative.    Objective:      Vitals:    08/17/23 1034   Weight: 116.7 kg (257 lb 4.4 oz)   Height: 5' 10" (1.778 m)     Physical Exam    MSK:  Positive C sign, positive Stinchfield sign.  No tenderness at the greater trochanter or iliotibial band.  No tenderness at the SI joint.  The patient has pain in the groin with passive flexion and internal rotation of the hip which is limited.  Knee benign without tenderness or effusion, with normal range of motion.  Skin intact.  Distal neurovascular intact.  Flip test negative.      Imaging Review: I independently reviewed and interpreted the imaging and my findings are as follows:   Xrays of bilateral hips showing     Assessment:     1. Bilateral hip pain    2. Avascular necrosis of bone of hip, right         Plan:     - Ambulatory referral placed to pain management; he will follow up as needed.    The patient's pathophysiology was explained in detail with reference to x-rays, models, other visual aids as appropriate.  Treatment options were discussed in detail.  Questions were invited and answered to the patient's satisfaction.   "

## 2023-08-18 PROBLEM — M87.051 AVASCULAR NECROSIS OF BONE OF HIP, RIGHT: Status: ACTIVE | Noted: 2023-08-18

## 2023-08-23 ENCOUNTER — TELEPHONE (OUTPATIENT)
Dept: PAIN MEDICINE | Facility: CLINIC | Age: 58
End: 2023-08-23
Payer: MEDICARE

## 2023-08-23 NOTE — TELEPHONE ENCOUNTER
Spoke with patient. Stated he is experiencing pain to both hips. He is having great difficulty with bending and moving without elevated pain to his hips. Appointment scheduled for date/time patient selected. No further issues discussed.

## 2023-09-07 ENCOUNTER — OFFICE VISIT (OUTPATIENT)
Dept: PAIN MEDICINE | Facility: CLINIC | Age: 58
End: 2023-09-07
Payer: MEDICARE

## 2023-09-07 VITALS
DIASTOLIC BLOOD PRESSURE: 62 MMHG | HEART RATE: 83 BPM | SYSTOLIC BLOOD PRESSURE: 117 MMHG | HEIGHT: 70 IN | WEIGHT: 260.38 LBS | BODY MASS INDEX: 37.28 KG/M2

## 2023-09-07 DIAGNOSIS — M25.551 BILATERAL HIP PAIN: ICD-10-CM

## 2023-09-07 DIAGNOSIS — M47.816 LUMBAR SPONDYLOSIS: ICD-10-CM

## 2023-09-07 DIAGNOSIS — M51.36 DDD (DEGENERATIVE DISC DISEASE), LUMBAR: ICD-10-CM

## 2023-09-07 DIAGNOSIS — J98.6 DIAPHRAGM PARALYSIS: Primary | ICD-10-CM

## 2023-09-07 DIAGNOSIS — M25.552 BILATERAL HIP PAIN: ICD-10-CM

## 2023-09-07 PROCEDURE — 3074F SYST BP LT 130 MM HG: CPT | Mod: HCNC,CPTII,S$GLB, | Performed by: PAIN MEDICINE

## 2023-09-07 PROCEDURE — 99204 OFFICE O/P NEW MOD 45 MIN: CPT | Mod: HCNC,S$GLB,, | Performed by: PAIN MEDICINE

## 2023-09-07 PROCEDURE — 99204 PR OFFICE/OUTPT VISIT, NEW, LEVL IV, 45-59 MIN: ICD-10-PCS | Mod: HCNC,S$GLB,, | Performed by: PAIN MEDICINE

## 2023-09-07 PROCEDURE — 1160F RVW MEDS BY RX/DR IN RCRD: CPT | Mod: HCNC,CPTII,S$GLB, | Performed by: PAIN MEDICINE

## 2023-09-07 PROCEDURE — 99999 PR PBB SHADOW E&M-EST. PATIENT-LVL V: ICD-10-PCS | Mod: PBBFAC,HCNC,, | Performed by: PAIN MEDICINE

## 2023-09-07 PROCEDURE — 1160F PR REVIEW ALL MEDS BY PRESCRIBER/CLIN PHARMACIST DOCUMENTED: ICD-10-PCS | Mod: HCNC,CPTII,S$GLB, | Performed by: PAIN MEDICINE

## 2023-09-07 PROCEDURE — 99999 PR PBB SHADOW E&M-EST. PATIENT-LVL V: CPT | Mod: PBBFAC,HCNC,, | Performed by: PAIN MEDICINE

## 2023-09-07 PROCEDURE — 3074F PR MOST RECENT SYSTOLIC BLOOD PRESSURE < 130 MM HG: ICD-10-PCS | Mod: HCNC,CPTII,S$GLB, | Performed by: PAIN MEDICINE

## 2023-09-07 PROCEDURE — 3044F HG A1C LEVEL LT 7.0%: CPT | Mod: HCNC,CPTII,S$GLB, | Performed by: PAIN MEDICINE

## 2023-09-07 PROCEDURE — 1159F MED LIST DOCD IN RCRD: CPT | Mod: HCNC,CPTII,S$GLB, | Performed by: PAIN MEDICINE

## 2023-09-07 PROCEDURE — 3078F PR MOST RECENT DIASTOLIC BLOOD PRESSURE < 80 MM HG: ICD-10-PCS | Mod: HCNC,CPTII,S$GLB, | Performed by: PAIN MEDICINE

## 2023-09-07 PROCEDURE — 3044F PR MOST RECENT HEMOGLOBIN A1C LEVEL <7.0%: ICD-10-PCS | Mod: HCNC,CPTII,S$GLB, | Performed by: PAIN MEDICINE

## 2023-09-07 PROCEDURE — 1159F PR MEDICATION LIST DOCUMENTED IN MEDICAL RECORD: ICD-10-PCS | Mod: HCNC,CPTII,S$GLB, | Performed by: PAIN MEDICINE

## 2023-09-07 PROCEDURE — 3008F BODY MASS INDEX DOCD: CPT | Mod: HCNC,CPTII,S$GLB, | Performed by: PAIN MEDICINE

## 2023-09-07 PROCEDURE — 3078F DIAST BP <80 MM HG: CPT | Mod: HCNC,CPTII,S$GLB, | Performed by: PAIN MEDICINE

## 2023-09-07 PROCEDURE — 3008F PR BODY MASS INDEX (BMI) DOCUMENTED: ICD-10-PCS | Mod: HCNC,CPTII,S$GLB, | Performed by: PAIN MEDICINE

## 2023-09-07 NOTE — PROGRESS NOTES
Subjective:     Patient ID: Mina Zelaya is a 58 y.o. male    Chief Complaint: Hip Pain (Pt states his hip pain has been for many years. Today lower back down the right side. )      Referred by: Germain Boateng MD      HPI:    Initial Encounter (9/7/23):  Mina Zelaya is a 58 y.o. male who presents today with chronic bilateral hip pain.  This pain has been present for at least 2 years and has been worsening slowly over time.  Pain is located primarily on the right side in the inguinal region.  Pain will not radiate.  He denies any associated numbness, tingling, weakness, bowel bladder dysfunction.  Patient has been evaluated by Orthopedic surgery.  X-rays do show signs of potential avascular necrosis.  MRIs were ordered, but patient unable to lay flat due to difficulty breathing related to diaphragm paralysis.  Patient also reports that he has chronic low back pain that has been significantly worsened over the past week.  This pain is located the right lower lumbar region and radiates to the right paraspinal region.  Pain will sometimes radiate to the right thigh but does not cross the knee.  He denies any associated numbness, tingling, weakness, bowel bladder dysfunction.   This pain is described in detail below.    Physical Therapy:  No.    Non-pharmacologic Treatment:  Rest helps         TENS?  No    Pain Medications:         Currently taking:  Ibuprofen, Norco    Has tried in the past:      Has not tried:  Muscle relaxants, TCAs, SNRIs, anticonvulsants, topical creams    Blood thinners:  Aspirin 81 mg    Interventional Therapies:  None    Relevant Surgeries:  None    Affecting sleep?  Yes    Affecting daily activities? yes    Depressive symptoms? No          SI/HI? No    Work status: Employed    Pain Scores:    Best:       0/10  Worst:     10/10  Usually:   6/10  Today:    4/10    Pain Disability Index  Family/Home Responsibilities:: 7  Recreation:: 8  Social Activity:: 5  Occupation:: 8  Sexual  Behavior:: 0  Self Care:: 8  Life-Support Activities:: 8  Pain Disability Index (PDI): 44    Review of Systems   Constitutional:  Negative for activity change, appetite change, chills, fatigue, fever and unexpected weight change.   HENT:  Negative for hearing loss.    Eyes:  Negative for visual disturbance.   Respiratory:  Negative for chest tightness and shortness of breath.    Cardiovascular:  Negative for chest pain.   Gastrointestinal:  Negative for abdominal pain, constipation, diarrhea, nausea and vomiting.   Genitourinary:  Negative for difficulty urinating.   Musculoskeletal:  Positive for arthralgias, back pain, gait problem and myalgias. Negative for neck pain.   Skin:  Negative for rash.   Neurological:  Negative for dizziness, weakness, light-headedness, numbness and headaches.   Psychiatric/Behavioral:  Positive for sleep disturbance. Negative for hallucinations and suicidal ideas. The patient is not nervous/anxious.        Past Medical History:   Diagnosis Date    Arthritis     Asthma     Chronic bronchitis     Emphysema of lung     Heavy alcohol consumption 10/1/2018    Type 2 diabetes mellitus with hyperlipidemia 2018       History reviewed. No pertinent surgical history.    Social History     Socioeconomic History    Marital status: Single   Tobacco Use    Smoking status: Former     Current packs/day: 0.00     Average packs/day: 1 pack/day for 15.0 years (15.0 ttl pk-yrs)     Types: Cigarettes     Start date: 1988     Quit date: 2003     Years since quittin.2    Smokeless tobacco: Never   Substance and Sexual Activity    Alcohol use: Yes     Alcohol/week: 25.0 standard drinks of alcohol     Types: 25 Cans of beer per week    Drug use: No    Sexual activity: Not Currently     Partners: Female       Review of patient's allergies indicates:   Allergen Reactions    Atorvastatin      myalgia    Sulfa (sulfonamide antibiotics) Rash       Current Outpatient Medications on File Prior to  Visit   Medication Sig Dispense Refill    albuterol (PROVENTIL/VENTOLIN HFA) 90 mcg/actuation inhaler 2 puffs every 4 hours as needed for cough, wheeze, or shortness of breath 18 g 3    allopurinoL (ZYLOPRIM) 100 MG tablet TAKE 1 TABLET EVERY DAY 90 tablet 3    allopurinoL (ZYLOPRIM) 300 MG tablet TAKE 1 TABLET EVERY DAY 90 tablet 3    ascorbic acid, vitamin C, (VITAMIN C) 1000 MG tablet Take 1,000 mg by mouth once daily.      aspirin (ECOTRIN) 81 MG EC tablet Take 81 mg by mouth once daily.      bumetanide (BUMEX) 2 MG tablet TAKE 1 TABLET EVERY DAY 90 tablet 2    calcium carbonate (OS-VÍCTOR) 600 mg (1,500 mg) Tab Take 600 mg by mouth once daily.      chlorhexidine (HIBICLENS) 4 % external liquid WASH DAILY FOR 3 DAYS THEN 3 DAYS PER WEEK FOR 2 WEEKS- REPEAT AS NEEDED 236 mL 1    cyanocobalamin (VITAMIN B-12) 100 MCG tablet Take 100 mcg by mouth once daily.      fish oil-omega-3 fatty acids 300-1,000 mg capsule Take 1,200 mg by mouth once daily.      HYDROcodone-acetaminophen (NORCO)  mg per tablet Take 1 tablet by mouth every 8 (eight) hours as needed for Pain. 90 tablet 0     mg tablet TAKE 1 TABLET THREE TIMES DAILY 270 tablet 1    loratadine (CLARITIN) 10 mg tablet Take 10 mg by mouth once daily.      metFORMIN (GLUCOPHAGE-XR) 500 MG ER 24hr tablet TAKE 1 TABLET EVERY MORNING WITH BREAKFAST 90 tablet 1    montelukast (SINGULAIR) 10 mg tablet TAKE 1 TABLET EVERY EVENING 90 tablet 3    mupirocin (BACTROBAN) 2 % ointment Apply topically 3 (three) times daily.      potassium chloride SA (K-DUR,KLOR-CON) 20 MEQ tablet TAKE 2 TABLETS TWICE A  tablet 3    rosuvastatin (CRESTOR) 5 MG tablet TAKE 1 TABLET EVERY DAY 90 tablet 2    tiotropium-olodateroL (STIOLTO RESPIMAT) 2.5-2.5 mcg/actuation Mist INHALE 2 PUFFS INTO THE LUNGS EVERY DAY 24 g 3    tofacitinib (XELJANZ XR) 11 mg Tb24 Take 11 mg by mouth once daily.      traZODone (DESYREL) 100 MG tablet TAKE 1 TABLET AT BEDTIME 90 tablet 3     "albuterol-ipratropium (DUO-NEB) 2.5 mg-0.5 mg/3 mL nebulizer solution Take 3 mLs by nebulization every 6 (six) hours as needed for Wheezing or Shortness of Breath. Rescue 120 vial 5    [DISCONTINUED] VENTOLIN HFA 90 mcg/actuation inhaler Inhale 1 puff into the lungs 4 (four) times daily as needed. 18 g 11     No current facility-administered medications on file prior to visit.       Objective:      /62   Pulse 83   Ht 5' 10" (1.778 m)   Wt 118.1 kg (260 lb 5.8 oz)   BMI 37.36 kg/m²     Exam:  GEN:  Well developed, well nourished.  No acute distress.  Normal pain behavior.  HEENT:  No trauma.  Mucous membranes moist.  Nares patent bilaterally.  PSYCH: Normal affect. Thought content appropriate.  CHEST:  Breathing symmetric.  No audible wheezing.  ABD: Soft, non-distended.  SKIN:  Warm, pink, dry.  No rash on exposed areas.    EXT:  No cyanosis, clubbing, or edema.  No color change or changes in nail or hair growth.  NEURO/MUSCULOSKELETAL:  Fully alert, oriented, and appropriate. Speech normal kevin. No cranial nerve deficits.   Gait:  Normal.  No trendelenburg sign bilaterally.   Motor Strength:  5/5 motor strength throughout lower extremities.   Sensory:  No sensory deficit in the lower extremities.   Reflexes:  1+ and symmetric throughout.  Downgoing Babinski's bilaterally.  No clonus or spasticity.  L-Spine:  Limited ROM with pain on flexion and extension.  Negative pain with axial/facet loading bilaterally.  Negative SLR bilaterally.    No TTP over lumbar paraspinals, bilateral SI joints, hips, piriformis muscles, or GTB.        Imaging:    Narrative & Impression    EXAMINATION:  XR HIP 3 OR 4 VIEWS BILATERAL     CLINICAL HISTORY:  Pain in right hip     TECHNIQUE:  Five views of the bilateral hips     COMPARISON:  08/24/2021     FINDINGS:  Examination is limited by patient body habitus.  Femoroacetabular joint spaces appear maintained.  Peripherally sclerotic lucencies of the right femoral head " concerning for avascular necrosis.  Possible similar findings within the left femoral head to a lesser degree however difficult to visualize due to suboptima penetration.  No evidence of cortical collapse.  No acute fracture or dislocation.     Impression:     As above.        Electronically signed by: Shahab Mendoza  Date:                                            06/02/2023  Time:                                           11:07       Assessment:       Encounter Diagnoses   Name Primary?    Bilateral hip pain     Diaphragm paralysis Yes    DDD (degenerative disc disease), lumbar     Lumbar spondylosis          Plan:       Mina was seen today for hip pain.    Diagnoses and all orders for this visit:    Diaphragm paralysis  -     Ambulatory referral/consult to Pulmonology; Future    Bilateral hip pain  -     Ambulatory referral/consult to Pain Clinic  -     Ambulatory referral/consult to Pulmonology; Future    DDD (degenerative disc disease), lumbar  -     Ambulatory referral/consult to Physical/Occupational Therapy; Future    Lumbar spondylosis  -     Ambulatory referral/consult to Physical/Occupational Therapy; Future        Mina Zelaya is a 58 y.o. male with chronic right hip pain.  Appears to be related to intra-articular pathology.  X-rays do show some findings consistent with avascular necrosis, but patient can not undergo MRI because he can not lay flat related to pulmonary issues.  Also has more acute worsening chronic low back pain.  This is concerning for acute disc herniation and possibly radicular pain.    Pertinent imaging studies reviewed by me. Imaging results were discussed with patient.  We will refer to Dr. Lew with pulmonology to get 2nd opinion regarding potential treatments for breathing issues.  We discussed that if his breathing issues can be improved, then additional options for diagnosis and treatment of other issues will be available.  Refer to PT for lumbar ROM, strengthening,  stretching and HEP.  Return to clinic as needed.  If able to tolerate, patient should undergo MRI right hip.  May also consider hip injection.  May also consider lumbar MRI if limited relief from physical therapy/home exercise program.            This note was created by combination of typed  and M-Modal dictation. Transcription and phonetic errors may be present.  If there are any questions, please contact me.

## 2023-09-11 DIAGNOSIS — R60.0 BILATERAL LEG EDEMA: ICD-10-CM

## 2023-09-11 DIAGNOSIS — M51.36 DDD (DEGENERATIVE DISC DISEASE), LUMBAR: ICD-10-CM

## 2023-09-11 NOTE — TELEPHONE ENCOUNTER
Care Due:                  Date            Visit Type   Department     Provider  --------------------------------------------------------------------------------                                 -                              PRIMARY      [facility]  Last Visit: 06-      CARE ([facility])   PRIMARY CARE   Otto Bruce  Next Visit: None Scheduled  None         None Found                                                            Last  Test          Frequency    Reason                     Performed    Due Date  --------------------------------------------------------------------------------    CBC.........  12 months..  allopurinoL..............  11- 11-    HBA1C.......  6 months...  metFORMIN................  06- 11-    Lipid Panel.  12 months..  rosuvastatin.............  11- 11-    [facility] Embedded Care Due Messages. Reference number: [id number].   9/11/2023 12:35:57 PM CDT

## 2023-09-12 DIAGNOSIS — M51.36 DDD (DEGENERATIVE DISC DISEASE), LUMBAR: ICD-10-CM

## 2023-09-12 DIAGNOSIS — L01.02 PUSTULAR FOLLICULITIS: Primary | ICD-10-CM

## 2023-09-12 RX ORDER — SPIRONOLACTONE 25 MG/1
TABLET ORAL
Qty: 90 TABLET | Refills: 1 | OUTPATIENT
Start: 2023-09-12

## 2023-09-12 RX ORDER — MUPIROCIN 20 MG/G
OINTMENT TOPICAL 3 TIMES DAILY
Qty: 22 G | Refills: 2 | Status: SHIPPED | OUTPATIENT
Start: 2023-09-12 | End: 2024-01-02 | Stop reason: SDUPTHER

## 2023-09-12 RX ORDER — IBUPROFEN 800 MG/1
TABLET ORAL
Qty: 270 TABLET | Refills: 1 | Status: SHIPPED | OUTPATIENT
Start: 2023-09-12

## 2023-09-12 RX ORDER — HYDROCODONE BITARTRATE AND ACETAMINOPHEN 10; 325 MG/1; MG/1
1 TABLET ORAL EVERY 8 HOURS PRN
Qty: 90 TABLET | Refills: 0 | Status: SHIPPED | OUTPATIENT
Start: 2023-09-12

## 2023-09-12 RX ORDER — DOXYCYCLINE 100 MG/1
100 CAPSULE ORAL EVERY 12 HOURS
Qty: 20 CAPSULE | Refills: 0 | Status: SHIPPED | OUTPATIENT
Start: 2023-09-12 | End: 2023-09-22

## 2023-09-12 NOTE — TELEPHONE ENCOUNTER
Called pt regarding message. Pt stated he still has raised bumps on his head,back and chest. Pt stated they are painful. No blisters, itching, or drainage reported. Pt stated he was using minocycline.  Pt requesting a different medication. Pt also requested derm referral.

## 2023-09-12 NOTE — TELEPHONE ENCOUNTER
Refill Routing Note   Medication(s) are not appropriate for processing by [facility] for the following reason(s):      Medication outside of protocol    [facility] action(s):  Quick Discontinue  Route Care Due:  Labs due     Medication Therapy Plan: Spironolactone The original prescription was discontinued on 2/14/2023 by Otto Bruce MD      Appointments  past 12m or future 3m with PCP    Date Provider   Last Visit   6/2/2023 Otto Bruce MD   Next Visit   Visit date not found Otto Bruce MD   ED visits in past 90 days: 0        Note composed:3:02 AM 09/12/2023

## 2023-09-12 NOTE — TELEPHONE ENCOUNTER
----- Message from Lloyd Carter sent at 9/12/2023  9:29 AM CDT -----  Contact: [phone] @ patient  Good morning patient would like a call back to see if he can get a apt. Patient is starting to get bumps all over his body.Please give patient a call back [phone]

## 2023-09-12 NOTE — TELEPHONE ENCOUNTER
No care due was identified.  Health [facility] Embedded Care Due Messages. Reference number: [id number].   9/12/2023 3:36:46 PM CDT

## 2023-09-27 ENCOUNTER — TELEPHONE (OUTPATIENT)
Dept: PRIMARY CARE CLINIC | Facility: CLINIC | Age: 58
End: 2023-09-27
Payer: MEDICARE

## 2023-09-27 NOTE — TELEPHONE ENCOUNTER
----- Message from Meaghan Anthony sent at 9/27/2023 10:23 AM CDT -----  Contact: Pt [phone]  Patient is calling about the dermatology referral, stating he has not heard from anyone concerning an appointment as of today.    Please call and advise.    Thank You

## 2023-09-28 ENCOUNTER — OFFICE VISIT (OUTPATIENT)
Dept: DERMATOLOGY | Facility: CLINIC | Age: 58
End: 2023-09-28
Payer: MEDICARE

## 2023-09-28 DIAGNOSIS — R21 RASH: ICD-10-CM

## 2023-09-28 PROCEDURE — 11104 PR PUNCH BIOPSY, SKIN, SINGLE LESION: ICD-10-PCS | Mod: S$GLB,,, | Performed by: STUDENT IN AN ORGANIZED HEALTH CARE EDUCATION/TRAINING PROGRAM

## 2023-09-28 PROCEDURE — 88312 SPECIAL STAINS GROUP 1: CPT | Mod: 26,HCNC,, | Performed by: PATHOLOGY

## 2023-09-28 PROCEDURE — 3044F PR MOST RECENT HEMOGLOBIN A1C LEVEL <7.0%: ICD-10-PCS | Mod: CPTII,S$GLB,, | Performed by: STUDENT IN AN ORGANIZED HEALTH CARE EDUCATION/TRAINING PROGRAM

## 2023-09-28 PROCEDURE — 1160F PR REVIEW ALL MEDS BY PRESCRIBER/CLIN PHARMACIST DOCUMENTED: ICD-10-PCS | Mod: CPTII,S$GLB,, | Performed by: STUDENT IN AN ORGANIZED HEALTH CARE EDUCATION/TRAINING PROGRAM

## 2023-09-28 PROCEDURE — 3044F HG A1C LEVEL LT 7.0%: CPT | Mod: CPTII,S$GLB,, | Performed by: STUDENT IN AN ORGANIZED HEALTH CARE EDUCATION/TRAINING PROGRAM

## 2023-09-28 PROCEDURE — 99204 PR OFFICE/OUTPT VISIT, NEW, LEVL IV, 45-59 MIN: ICD-10-PCS | Mod: 25,S$GLB,, | Performed by: STUDENT IN AN ORGANIZED HEALTH CARE EDUCATION/TRAINING PROGRAM

## 2023-09-28 PROCEDURE — 1160F RVW MEDS BY RX/DR IN RCRD: CPT | Mod: CPTII,S$GLB,, | Performed by: STUDENT IN AN ORGANIZED HEALTH CARE EDUCATION/TRAINING PROGRAM

## 2023-09-28 PROCEDURE — 99204 OFFICE O/P NEW MOD 45 MIN: CPT | Mod: 25,S$GLB,, | Performed by: STUDENT IN AN ORGANIZED HEALTH CARE EDUCATION/TRAINING PROGRAM

## 2023-09-28 PROCEDURE — 88305 TISSUE EXAM BY PATHOLOGIST: CPT | Mod: HCNC | Performed by: PATHOLOGY

## 2023-09-28 PROCEDURE — 88312 SPECIAL STAINS GROUP 1: CPT | Mod: HCNC | Performed by: PATHOLOGY

## 2023-09-28 PROCEDURE — 1159F PR MEDICATION LIST DOCUMENTED IN MEDICAL RECORD: ICD-10-PCS | Mod: CPTII,S$GLB,, | Performed by: STUDENT IN AN ORGANIZED HEALTH CARE EDUCATION/TRAINING PROGRAM

## 2023-09-28 PROCEDURE — 1159F MED LIST DOCD IN RCRD: CPT | Mod: CPTII,S$GLB,, | Performed by: STUDENT IN AN ORGANIZED HEALTH CARE EDUCATION/TRAINING PROGRAM

## 2023-09-28 PROCEDURE — 88305 TISSUE EXAM BY PATHOLOGIST: ICD-10-PCS | Mod: 26,HCNC,, | Performed by: PATHOLOGY

## 2023-09-28 PROCEDURE — 88312 PR  SPECIAL STAINS,GROUP I: ICD-10-PCS | Mod: 26,HCNC,, | Performed by: PATHOLOGY

## 2023-09-28 PROCEDURE — 87070 CULTURE OTHR SPECIMN AEROBIC: CPT | Mod: HCNC | Performed by: STUDENT IN AN ORGANIZED HEALTH CARE EDUCATION/TRAINING PROGRAM

## 2023-09-28 PROCEDURE — 88305 TISSUE EXAM BY PATHOLOGIST: CPT | Mod: 26,HCNC,, | Performed by: PATHOLOGY

## 2023-09-28 PROCEDURE — 11104 PUNCH BX SKIN SINGLE LESION: CPT | Mod: S$GLB,,, | Performed by: STUDENT IN AN ORGANIZED HEALTH CARE EDUCATION/TRAINING PROGRAM

## 2023-09-28 RX ORDER — KETOCONAZOLE 20 MG/ML
SHAMPOO, SUSPENSION TOPICAL
Qty: 120 ML | Refills: 3 | Status: SHIPPED | OUTPATIENT
Start: 2023-09-29 | End: 2024-01-02 | Stop reason: SDUPTHER

## 2023-09-28 RX ORDER — DOXYCYCLINE 100 MG/1
100 CAPSULE ORAL DAILY
Qty: 30 CAPSULE | Refills: 1 | Status: SHIPPED | OUTPATIENT
Start: 2023-09-28

## 2023-09-28 NOTE — PATIENT INSTRUCTIONS
Punch Biopsy Wound Care    Your doctor has performed a punch biopsy today.  A band aid and antibiotic ointment has been placed over the site.  This should remain in place for 24 hours.  It is recommended that you keep the area dry for the first 24 hours.  After 24 hours, you may remove the band aid and wash the area with warm soap and water and apply Vaseline jelly.  Many patients prefer to use Neosporin or Bacitracin ointment.  This is acceptable; however know that you can develop an allergy to this medication even if you have used it safely for years.  It is important to keep the area moist.  Letting it dry out and get air slows healing time, will worsen the scar, and make it more difficult to remove the stitches if they were placed.  Band aid is optional after first 24 hours.      If you notice increasing redness, tenderness, pain, or yellow drainage at the biopsy or surgical site, please notify your doctor.  These are signs of an infection.    If your biopsy/surgical site is bleeding, apply firm pressure for 15 minutes straight.  Repeat for another 15 minutes, if it is still bleeding.   If the surgical site continues to bleed, then please contact your doctor.      [id number] [place] [address]/ [phone] FAX/ [email or web address]

## 2023-09-28 NOTE — PROGRESS NOTES
Subjective:      Patient ID:  Mina Zelaya is a 58 y.o. male who presents for   Chief Complaint   Patient presents with    Rash     Scalp, neck, back     New patient     Patient here today for bumps on scalp, neck, and back x approx 6 months. Patient states they will slightly itch but it is mostly painful. Clears with antibiotics, recently finished minocycline but they are now coming back. Washes with Head and Shoulders. Applies mupirocin in nose.     Hx of ulcerative cholitis- on xeljanz for years      Current Outpatient Medications:   ·  albuterol (PROVENTIL/VENTOLIN HFA) 90 mcg/actuation inhaler, 2 puffs every 4 hours as needed for cough, wheeze, or shortness of breath, Disp: 18 g, Rfl: 3  ·  allopurinoL (ZYLOPRIM) 100 MG tablet, TAKE 1 TABLET EVERY DAY, Disp: 90 tablet, Rfl: 3  ·  allopurinoL (ZYLOPRIM) 300 MG tablet, TAKE 1 TABLET EVERY DAY, Disp: 90 tablet, Rfl: 3  ·  ascorbic acid, vitamin C, (VITAMIN C) 1000 MG tablet, Take 1,000 mg by mouth once daily., Disp: , Rfl:   ·  aspirin (ECOTRIN) 81 MG EC tablet, Take 81 mg by mouth once daily., Disp: , Rfl:   ·  bumetanide (BUMEX) 2 MG tablet, TAKE 1 TABLET EVERY DAY, Disp: 90 tablet, Rfl: 2  ·  calcium carbonate (OS-VÍCTOR) 600 mg (1,500 mg) Tab, Take 600 mg by mouth once daily., Disp: , Rfl:   ·  chlorhexidine (HIBICLENS) 4 % external liquid, WASH DAILY FOR 3 DAYS THEN 3 DAYS PER WEEK FOR 2 WEEKS- REPEAT AS NEEDED (Patient not taking: Reported on 9/29/2023), Disp: 236 mL, Rfl: 1  ·  cyanocobalamin (VITAMIN B-12) 100 MCG tablet, Take 100 mcg by mouth once daily., Disp: , Rfl:   ·  fish oil-omega-3 fatty acids 300-1,000 mg capsule, Take 1,200 mg by mouth once daily., Disp: , Rfl:   ·  HYDROcodone-acetaminophen (NORCO)  mg per tablet, Take 1 tablet by mouth every 8 (eight) hours as needed for Pain., Disp: 90 tablet, Rfl: 0  ·   mg tablet, TAKE 1 TABLET THREE TIMES DAILY, Disp: 270 tablet, Rfl: 1  ·  loratadine (CLARITIN) 10 mg tablet, Take 10 mg by  mouth once daily., Disp: , Rfl:   ·  metFORMIN (GLUCOPHAGE-XR) 500 MG ER 24hr tablet, TAKE 1 TABLET EVERY MORNING WITH BREAKFAST, Disp: 90 tablet, Rfl: 1  ·  montelukast (SINGULAIR) 10 mg tablet, TAKE 1 TABLET EVERY EVENING, Disp: 90 tablet, Rfl: 3  ·  mupirocin (BACTROBAN) 2 % ointment, Apply topically 3 (three) times daily. (Patient not taking: Reported on 9/29/2023), Disp: 22 g, Rfl: 2  ·  potassium chloride SA (K-DUR,KLOR-CON) 20 MEQ tablet, TAKE 2 TABLETS TWICE A DAY, Disp: 360 tablet, Rfl: 3  ·  rosuvastatin (CRESTOR) 5 MG tablet, TAKE 1 TABLET EVERY DAY, Disp: 90 tablet, Rfl: 2  ·  tiotropium-olodateroL (STIOLTO RESPIMAT) 2.5-2.5 mcg/actuation Mist, INHALE 2 PUFFS INTO THE LUNGS EVERY DAY, Disp: 24 g, Rfl: 3  ·  tofacitinib (XELJANZ XR) 11 mg Tb24, Take 11 mg by mouth once daily., Disp: , Rfl:   ·  traZODone (DESYREL) 100 MG tablet, TAKE 1 TABLET AT BEDTIME, Disp: 90 tablet, Rfl: 3  ·  albuterol-ipratropium (DUO-NEB) 2.5 mg-0.5 mg/3 mL nebulizer solution, Take 3 mLs by nebulization every 6 (six) hours as needed for Wheezing or Shortness of Breath. Rescue, Disp: 120 vial, Rfl: 5  ·  diazePAM (VALIUM) 10 MG Tab, , Disp: , Rfl:   ·  doxycycline (VIBRAMYCIN) 100 MG Cap, Take 1 capsule (100 mg total) by mouth once daily. (Patient not taking: Reported on 9/29/2023), Disp: 30 capsule, Rfl: 1  ·  ketoconazole (NIZORAL) 2 % shampoo, Apply topically 3 (three) times a week. (Patient not taking: Reported on 9/29/2023), Disp: 120 mL, Rfl: 3  ·  SHINGRIX, PF, 50 mcg/0.5 mL injection, , Disp: , Rfl:         Review of Systems   Constitutional:  Negative for fever, chills and fatigue.   Respiratory:  Negative for cough and shortness of breath.    Gastrointestinal:  Negative for nausea and vomiting.   Skin:  Positive for itching and rash.       Objective:   Physical Exam   Constitutional: He appears well-developed and well-nourished.   Neurological: He is alert and oriented to person, place, and time.   Psychiatric: He has a  normal mood and affect.   Skin:   Areas Examined (abnormalities noted in diagram):   Scalp / Hair Palpated and Inspected  Chest / Axilla Inspection Performed  Abdomen Inspection Performed  Back Inspection Performed                 Diagram Legend     Erythematous scaling macule/papule c/w actinic keratosis       Vascular papule c/w angioma      Pigmented verrucoid papule/plaque c/w seborrheic keratosis      Yellow umbilicated papule c/w sebaceous hyperplasia      Irregularly shaped tan macule c/w lentigo     1-2 mm smooth white papules consistent with Milia      Movable subcutaneous cyst with punctum c/w epidermal inclusion cyst      Subcutaneous movable cyst c/w pilar cyst      Firm pink to brown papule c/w dermatofibroma      Pedunculated fleshy papule(s) c/w skin tag(s)      Evenly pigmented macule c/w junctional nevus     Mildly variegated pigmented, slightly irregular-bordered macule c/w mildly atypical nevus      Flesh colored to evenly pigmented papule c/w intradermal nevus       Pink pearly papule/plaque c/w basal cell carcinoma      Erythematous hyperkeratotic cursted plaque c/w SCC      Surgical scar with no sign of skin cancer recurrence      Open and closed comedones      Inflammatory papules and pustules      Verrucoid papule consistent consistent with wart     Erythematous eczematous patches and plaques     Dystrophic onycholytic nail with subungual debris c/w onychomycosis     Umbilicated papule    Erythematous-base heme-crusted tan verrucoid plaque consistent with inflamed seborrheic keratosis     Erythematous Silvery Scaling Plaque c/w Psoriasis     See annotation            Assessment / Plan:      Pathology Orders:       Normal Orders This Visit    Specimen to Pathology, Dermatology     Questions:    Procedure Type: Dermatology and skin neoplasms    Number of Specimens: 1    ------------------------: -------------------------    Spec 1 Procedure: Biopsy    Spec 1 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 tofacitinib associated folliculitis vs bacterial folliculitis    Spec 1 Source: left upper arm    Release to patient:           Rash  -     Ambulatory referral/consult to Dermatology  -     doxycycline (VIBRAMYCIN) 100 MG Cap; Take 1 capsule (100 mg total) by mouth once daily.  Dispense: 30 capsule; Refill: 1  -     ketoconazole (NIZORAL) 2 % shampoo; Apply topically 3 (three) times a week.  Dispense: 120 mL; Refill: 3  -     Specimen to Pathology, Dermatology  -     Aerobic culture  Punch biopsy procedure note:  Punch biopsy performed after verbal consent obtained. Area marked and prepped with alcohol. Approximately 1cc of 2% lidocaine with epinephrine injected. 4 mm disposable punch used to remove lesion. Hemostasis obtained and biopsy site closed with 2 Prolene sutures. Wound care instructions reviewed with patient and handout given.  Discussed benefits and risks of doxycyline therapy including but not limited to GI discomfort, esophageal irritation/ulceration, and increased sun sensitivity. Patient was counseled to take medicine with meals and at least 1 hour before lying down.              No follow-ups on file.

## 2023-09-29 ENCOUNTER — TELEPHONE (OUTPATIENT)
Dept: DERMATOLOGY | Facility: CLINIC | Age: 58
End: 2023-09-29
Payer: MEDICARE

## 2023-09-29 ENCOUNTER — OFFICE VISIT (OUTPATIENT)
Dept: PULMONOLOGY | Facility: CLINIC | Age: 58
End: 2023-09-29
Payer: MEDICARE

## 2023-09-29 VITALS
DIASTOLIC BLOOD PRESSURE: 67 MMHG | SYSTOLIC BLOOD PRESSURE: 112 MMHG | OXYGEN SATURATION: 95 % | HEART RATE: 66 BPM | BODY MASS INDEX: 36.33 KG/M2 | WEIGHT: 253.75 LBS | HEIGHT: 70 IN

## 2023-09-29 DIAGNOSIS — M25.551 BILATERAL HIP PAIN: ICD-10-CM

## 2023-09-29 DIAGNOSIS — J44.9 CHRONIC OBSTRUCTIVE PULMONARY DISEASE, UNSPECIFIED COPD TYPE: Primary | ICD-10-CM

## 2023-09-29 DIAGNOSIS — J98.6 DIAPHRAGM PARALYSIS: ICD-10-CM

## 2023-09-29 DIAGNOSIS — M25.552 BILATERAL HIP PAIN: ICD-10-CM

## 2023-09-29 PROCEDURE — 3008F PR BODY MASS INDEX (BMI) DOCUMENTED: ICD-10-PCS | Mod: HCNC,CPTII,S$GLB, | Performed by: INTERNAL MEDICINE

## 2023-09-29 PROCEDURE — 3044F PR MOST RECENT HEMOGLOBIN A1C LEVEL <7.0%: ICD-10-PCS | Mod: HCNC,CPTII,S$GLB, | Performed by: INTERNAL MEDICINE

## 2023-09-29 PROCEDURE — 99215 OFFICE O/P EST HI 40 MIN: CPT | Mod: HCNC,S$GLB,, | Performed by: INTERNAL MEDICINE

## 2023-09-29 PROCEDURE — 3044F HG A1C LEVEL LT 7.0%: CPT | Mod: HCNC,CPTII,S$GLB, | Performed by: INTERNAL MEDICINE

## 2023-09-29 PROCEDURE — 3078F PR MOST RECENT DIASTOLIC BLOOD PRESSURE < 80 MM HG: ICD-10-PCS | Mod: HCNC,CPTII,S$GLB, | Performed by: INTERNAL MEDICINE

## 2023-09-29 PROCEDURE — 3078F DIAST BP <80 MM HG: CPT | Mod: HCNC,CPTII,S$GLB, | Performed by: INTERNAL MEDICINE

## 2023-09-29 PROCEDURE — 99215 PR OFFICE/OUTPT VISIT, EST, LEVL V, 40-54 MIN: ICD-10-PCS | Mod: HCNC,S$GLB,, | Performed by: INTERNAL MEDICINE

## 2023-09-29 PROCEDURE — 1159F MED LIST DOCD IN RCRD: CPT | Mod: HCNC,CPTII,S$GLB, | Performed by: INTERNAL MEDICINE

## 2023-09-29 PROCEDURE — 3074F SYST BP LT 130 MM HG: CPT | Mod: HCNC,CPTII,S$GLB, | Performed by: INTERNAL MEDICINE

## 2023-09-29 PROCEDURE — 3008F BODY MASS INDEX DOCD: CPT | Mod: HCNC,CPTII,S$GLB, | Performed by: INTERNAL MEDICINE

## 2023-09-29 PROCEDURE — 99999 PR PBB SHADOW E&M-EST. PATIENT-LVL V: ICD-10-PCS | Mod: PBBFAC,HCNC,, | Performed by: INTERNAL MEDICINE

## 2023-09-29 PROCEDURE — 99999 PR PBB SHADOW E&M-EST. PATIENT-LVL V: CPT | Mod: PBBFAC,HCNC,, | Performed by: INTERNAL MEDICINE

## 2023-09-29 PROCEDURE — 1159F PR MEDICATION LIST DOCUMENTED IN MEDICAL RECORD: ICD-10-PCS | Mod: HCNC,CPTII,S$GLB, | Performed by: INTERNAL MEDICINE

## 2023-09-29 PROCEDURE — 3074F PR MOST RECENT SYSTOLIC BLOOD PRESSURE < 130 MM HG: ICD-10-PCS | Mod: HCNC,CPTII,S$GLB, | Performed by: INTERNAL MEDICINE

## 2023-09-29 RX ORDER — ZOSTER VACCINE RECOMBINANT, ADJUVANTED 50 MCG/0.5
KIT INTRAMUSCULAR
COMMUNITY
Start: 2023-05-06

## 2023-09-29 RX ORDER — DIAZEPAM 10 MG/1
TABLET ORAL
COMMUNITY

## 2023-09-29 NOTE — PROGRESS NOTES
History & Physical  [facility]    SUBJECTIVE:     Chief Complaint:   Diaphragm paralysis    History of Present Illness:  Mina Zelaya is a 58 y.o. male who presents for evaluation of diaphragmatic paralysis. Pt reports chronic dyspnea one exertion. Pt reports orthopnea. Pt has used NIV for many years.    Pt sees Dr Pugh from pain medicine. MRI has been recommended due to hip pain & concern for avascular necrosis. Pt seen today at Dr Pugh request to see if any additional pulmonary management can be offered to facilitate an MRI.    The pt is a former smoker who quit smoking at ~38 years old. He smoked 1 - 1.5 PPD on average. This yields ~25 pack-year history.    He used to work managing oil spills. He had to retire in  due to respiratory problems.    Review of patient's allergies indicates:   Allergen Reactions    Atorvastatin      myalgia    Sulfa (sulfonamide antibiotics) Rash       Past Medical History:   Diagnosis Date    Arthritis     Asthma     Chronic bronchitis     Emphysema of lung     Heavy alcohol consumption 10/1/2018    Type 2 diabetes mellitus with hyperlipidemia 2018     No past surgical history on file.  Family History   Problem Relation Age of Onset    Stroke Mother     No Known Problems Father     Pneumonia Sister     Stroke Brother     No Known Problems Maternal Aunt     Stroke Paternal Aunt     Cancer Paternal Uncle     Cancer Maternal Grandmother     Heart failure Maternal Grandfather     Stroke Paternal Grandmother     No Known Problems Paternal Grandfather     Stroke Brother     No Known Problems Brother     No Known Problems Brother      Social History     Socioeconomic History    Marital status: Single   Tobacco Use    Smoking status: Former     Current packs/day: 0.00     Average packs/day: 1 pack/day for 15.0 years (15.0 ttl pk-yrs)     Types: Cigarettes     Start date: 1988     Quit date: 2003     Years since quittin.3    Smokeless tobacco: Never  "  Substance and Sexual Activity    Alcohol use: Yes     Alcohol/week: 25.0 standard drinks of alcohol     Types: 25 Cans of beer per week    Drug use: No    Sexual activity: Not Currently     Partners: Female     Review of Systems:  No pertinent positives    OBJECTIVE:     Vital Signs  Vitals:    09/29/23 0829   BP: 112/67   BP Location: Left arm   Patient Position: Sitting   BP Method: Medium (Automatic)   Pulse: 66   SpO2: 95%   Weight: 115.1 kg (253 lb 12 oz)   Height: 5' 10" (1.778 m)     Body mass index is 36.41 kg/m².    Physical Exam:  General: no distress  Eyes:  conjunctivae/corneas clear  Nose: no discharge  Neck: trachea midline   Lungs:  normal respiratory effort  Abdomen: +central obesity  Skin: No rashes or lesions visible  Neurologic: alert, oriented, thought content appropriate    Laboratory:  Lab Results   Component Value Date    WBC 6.94 11/28/2022    HGB 12.2 (L) 11/28/2022    HCT 38.7 (L) 11/28/2022    MCV 95 11/28/2022     11/28/2022     Chest Imaging, My Impression:   CT Chest 2016: no evidence of parenchymal lung disease. Poor aeration of the bilateral bases.    Diagnostic Results:  PFT 7/16/2015:      PFT 3/11/2009:    ASSESSMENT/PLAN:     1) Diaphragmatic Paralysis  - Severe restriction on PFT with FVC 40% of predicted. CT chest with no evidence of parenchymal lung disease. In other words, severe restriction is secondary to diaphragm problem not lung disease.  - Fluoro diaphragm study reviewed (4/25/2016).  - Continue non-invasive ventilation per Dr Leiva.    2) COPD  - This is not the primary problem. Problem #1 is the primary problem. Continue stiolto respimat.    3) Obesity with BMI of 36  - Recommend weight loss.    Pt sees Dr Pugh from pain medicine. MRI has been recommended due to hip pain & concern for avascular necrosis. Pt seen today at Dr Pugh request to see if any additional pulmonary management can be offered to facilitate an MRI. I performed an extensive review of " the pt's medical record. I agree with the diagnoses of pt's existing pulmonologist, Dr Leiva. The treatment of the pt's respiratory problems is well managed, & I have nothing additional to offer here.    From a physiologic standpoint, the pt's report that he cannot lay flat is accurate & consistent with the severity of his respiratory impairment. In order to obtain an MRI, the pt would require anesthesia to provide noninvasive ventilatory support & potentially medication for anxiety (e.g. precedex might be a good option). This is not free of risk, but could be done if it is necessary to treat his hip disease.    Total professional time spent for the encounter: 40 minutes  Time was spent preparing to see the patient, reviewing results of prior testing, obtaining and/or reviewing separately obtained history, performing a medically appropriate examination and interview, counseling and educating the patient/family, ordering medications/tests/procedures, referring and communicating with other health care professionals, documenting clinical information in the electronic health record, and independently interpreting results.    [place], MD  [facility]

## 2023-09-29 NOTE — TELEPHONE ENCOUNTER
----- Message from Екатерина Landis RN sent at 9/28/2023  2:37 PM CDT -----  Contact: Pt [phone]  [facility]  ----- Message -----  From: Blake Aguiar  Sent: 9/28/2023   2:34 PM CDT  To: Nigel Harding Staff    Pt called in regards to getting a copy of the visit summary mailed to his home address he does not have access to the portal and he did not get it when he came in today please advise.

## 2023-10-02 LAB — BACTERIA SPEC AEROBE CULT: NO GROWTH

## 2023-10-04 LAB
FINAL PATHOLOGIC DIAGNOSIS: NORMAL
GROSS: NORMAL
Lab: NORMAL
MICROSCOPIC EXAM: NORMAL

## 2023-10-05 PROBLEM — M25.69 DECREASED RANGE OF MOTION OF TRUNK AND BACK: Status: ACTIVE | Noted: 2023-10-05

## 2023-10-05 PROBLEM — M62.81 WEAKNESS OF TRUNK MUSCULATURE: Status: ACTIVE | Noted: 2023-10-05

## 2023-10-16 ENCOUNTER — OFFICE VISIT (OUTPATIENT)
Dept: DERMATOLOGY | Facility: CLINIC | Age: 58
End: 2023-10-16
Payer: MEDICARE

## 2023-10-16 DIAGNOSIS — L73.8 PITYROSPORUM FOLLICULITIS: Primary | ICD-10-CM

## 2023-10-16 DIAGNOSIS — L73.8 SEBACEOUS HYPERPLASIA: ICD-10-CM

## 2023-10-16 DIAGNOSIS — L73.8 BACTERIAL FOLLICULITIS: ICD-10-CM

## 2023-10-16 DIAGNOSIS — L71.9 ROSACEA: ICD-10-CM

## 2023-10-16 PROCEDURE — 1159F MED LIST DOCD IN RCRD: CPT | Mod: CPTII,S$GLB,, | Performed by: STUDENT IN AN ORGANIZED HEALTH CARE EDUCATION/TRAINING PROGRAM

## 2023-10-16 PROCEDURE — 1160F PR REVIEW ALL MEDS BY PRESCRIBER/CLIN PHARMACIST DOCUMENTED: ICD-10-PCS | Mod: CPTII,S$GLB,, | Performed by: STUDENT IN AN ORGANIZED HEALTH CARE EDUCATION/TRAINING PROGRAM

## 2023-10-16 PROCEDURE — 3044F HG A1C LEVEL LT 7.0%: CPT | Mod: CPTII,S$GLB,, | Performed by: STUDENT IN AN ORGANIZED HEALTH CARE EDUCATION/TRAINING PROGRAM

## 2023-10-16 PROCEDURE — 1159F PR MEDICATION LIST DOCUMENTED IN MEDICAL RECORD: ICD-10-PCS | Mod: CPTII,S$GLB,, | Performed by: STUDENT IN AN ORGANIZED HEALTH CARE EDUCATION/TRAINING PROGRAM

## 2023-10-16 PROCEDURE — 99214 OFFICE O/P EST MOD 30 MIN: CPT | Mod: S$GLB,,, | Performed by: STUDENT IN AN ORGANIZED HEALTH CARE EDUCATION/TRAINING PROGRAM

## 2023-10-16 PROCEDURE — 1160F RVW MEDS BY RX/DR IN RCRD: CPT | Mod: CPTII,S$GLB,, | Performed by: STUDENT IN AN ORGANIZED HEALTH CARE EDUCATION/TRAINING PROGRAM

## 2023-10-16 PROCEDURE — 3044F PR MOST RECENT HEMOGLOBIN A1C LEVEL <7.0%: ICD-10-PCS | Mod: CPTII,S$GLB,, | Performed by: STUDENT IN AN ORGANIZED HEALTH CARE EDUCATION/TRAINING PROGRAM

## 2023-10-16 PROCEDURE — 99214 PR OFFICE/OUTPT VISIT, EST, LEVL IV, 30-39 MIN: ICD-10-PCS | Mod: S$GLB,,, | Performed by: STUDENT IN AN ORGANIZED HEALTH CARE EDUCATION/TRAINING PROGRAM

## 2023-10-16 RX ORDER — KETOCONAZOLE 20 MG/G
CREAM TOPICAL 2 TIMES DAILY
Qty: 60 G | Refills: 1 | Status: SHIPPED | OUTPATIENT
Start: 2023-10-16

## 2023-10-16 RX ORDER — FLUCONAZOLE 200 MG/1
TABLET ORAL
Qty: 16 TABLET | Refills: 0 | Status: SHIPPED | OUTPATIENT
Start: 2023-10-16

## 2023-10-16 NOTE — PROGRESS NOTES
Subjective:      Patient ID:  Mina Zelaya is a 58 y.o. male who presents for   Chief Complaint   Patient presents with    Follow-up     Discuss biopsy results     LOV 9/28/23    Patient here today to discuss biopsy results.  Taking Doxycycline, has not helped. Still getting more.  Culture done at last visit - negative.    Final Pathologic Diagnosis Skin, left upper arm, punch biopsy:   -PITYROSPORUM AND BACTERIAL FOLLICULITIS, see comment     COMMENT:  Clinical images were reviewed in epic and differential diagnosis is noted.  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  Correlation with culture is   recommended.    Hx of ulcerative cholitis- on xeljanz for years      Current Outpatient Medications:   ·  albuterol (PROVENTIL/VENTOLIN HFA) 90 mcg/actuation inhaler, 2 puffs every 4 hours as needed for cough, wheeze, or shortness of breath, Disp: 18 g, Rfl: 3  ·  allopurinoL (ZYLOPRIM) 100 MG tablet, TAKE 1 TABLET EVERY DAY, Disp: 90 tablet, Rfl: 3  ·  allopurinoL (ZYLOPRIM) 300 MG tablet, TAKE 1 TABLET EVERY DAY, Disp: 90 tablet, Rfl: 3  ·  ascorbic acid, vitamin C, (VITAMIN C) 1000 MG tablet, Take 1,000 mg by mouth once daily., Disp: , Rfl:   ·  aspirin (ECOTRIN) 81 MG EC tablet, Take 81 mg by mouth once daily., Disp: , Rfl:   ·  bumetanide (BUMEX) 2 MG tablet, TAKE 1 TABLET EVERY DAY, Disp: 90 tablet, Rfl: 2  ·  calcium carbonate (OS-VÍCTOR) 600 mg (1,500 mg) Tab, Take 600 mg by mouth once daily., Disp: , Rfl:   ·  cyanocobalamin (VITAMIN B-12) 100 MCG tablet, Take 100 mcg by mouth once daily., Disp: , Rfl:   ·  diazePAM (VALIUM) 10 MG Tab, , Disp: , Rfl:   ·  doxycycline (VIBRAMYCIN) 100 MG Cap, Take 1 capsule (100 mg total) by mouth once daily., Disp: 30 capsule, Rfl: 1  ·  fish oil-omega-3 fatty acids 300-1,000 mg capsule, Take 1,200 mg by mouth once daily., Disp: , Rfl:   ·  HYDROcodone-acetaminophen (NORCO)  mg per  tablet, Take 1 tablet by mouth every 8 (eight) hours as needed for Pain., Disp: 90 tablet, Rfl: 0  ·   mg tablet, TAKE 1 TABLET THREE TIMES DAILY, Disp: 270 tablet, Rfl: 1  ·  ketoconazole (NIZORAL) 2 % shampoo, Apply topically 3 (three) times a week., Disp: 120 mL, Rfl: 3  ·  loratadine (CLARITIN) 10 mg tablet, Take 10 mg by mouth once daily., Disp: , Rfl:   ·  metFORMIN (GLUCOPHAGE-XR) 500 MG ER 24hr tablet, TAKE 1 TABLET EVERY MORNING WITH BREAKFAST, Disp: 90 tablet, Rfl: 1  ·  montelukast (SINGULAIR) 10 mg tablet, TAKE 1 TABLET EVERY EVENING, Disp: 90 tablet, Rfl: 3  ·  mupirocin (BACTROBAN) 2 % ointment, Apply topically 3 (three) times daily., Disp: 22 g, Rfl: 2  ·  potassium chloride SA (K-DUR,KLOR-CON) 20 MEQ tablet, TAKE 2 TABLETS TWICE A DAY, Disp: 360 tablet, Rfl: 3  ·  rosuvastatin (CRESTOR) 5 MG tablet, TAKE 1 TABLET EVERY DAY, Disp: 90 tablet, Rfl: 2  ·  SHINGRIX, PF, 50 mcg/0.5 mL injection, , Disp: , Rfl:   ·  tiotropium-olodateroL (STIOLTO RESPIMAT) 2.5-2.5 mcg/actuation Mist, INHALE 2 PUFFS INTO THE LUNGS EVERY DAY, Disp: 24 g, Rfl: 3  ·  tofacitinib (XELJANZ XR) 11 mg Tb24, Take 11 mg by mouth once daily., Disp: , Rfl:   ·  traZODone (DESYREL) 100 MG tablet, TAKE 1 TABLET AT BEDTIME, Disp: 90 tablet, Rfl: 3  ·  albuterol-ipratropium (DUO-NEB) 2.5 mg-0.5 mg/3 mL nebulizer solution, Take 3 mLs by nebulization every 6 (six) hours as needed for Wheezing or Shortness of Breath. Rescue, Disp: 120 vial, Rfl: 5  ·  chlorhexidine (HIBICLENS) 4 % external liquid, WASH DAILY FOR 3 DAYS THEN 3 DAYS PER WEEK FOR 2 WEEKS- REPEAT AS NEEDED (Patient not taking: Reported on 9/29/2023), Disp: 236 mL, Rfl: 1          Review of Systems   Constitutional:  Negative for fever, chills and fatigue.   Respiratory:  Negative for cough and shortness of breath.    Gastrointestinal:  Negative for nausea and vomiting.   Skin:  Positive for itching and rash.       Objective:   Physical Exam   Constitutional: He appears  well-developed and well-nourished.   Neurological: He is alert and oriented to person, place, and time.   Psychiatric: He has a normal mood and affect.   Skin:   Areas Examined (abnormalities noted in diagram):   Head / Face Inspection Performed  Chest / Axilla Inspection Performed  Back Inspection Performed  RUE Inspected  LUE Inspection Performed            Diagram Legend     Erythematous scaling macule/papule c/w actinic keratosis       Vascular papule c/w angioma      Pigmented verrucoid papule/plaque c/w seborrheic keratosis      Yellow umbilicated papule c/w sebaceous hyperplasia      Irregularly shaped tan macule c/w lentigo     1-2 mm smooth white papules consistent with Milia      Movable subcutaneous cyst with punctum c/w epidermal inclusion cyst      Subcutaneous movable cyst c/w pilar cyst      Firm pink to brown papule c/w dermatofibroma      Pedunculated fleshy papule(s) c/w skin tag(s)      Evenly pigmented macule c/w junctional nevus     Mildly variegated pigmented, slightly irregular-bordered macule c/w mildly atypical nevus      Flesh colored to evenly pigmented papule c/w intradermal nevus       Pink pearly papule/plaque c/w basal cell carcinoma      Erythematous hyperkeratotic cursted plaque c/w SCC      Surgical scar with no sign of skin cancer recurrence      Open and closed comedones      Inflammatory papules and pustules      Verrucoid papule consistent consistent with wart     Erythematous eczematous patches and plaques     Dystrophic onycholytic nail with subungual debris c/w onychomycosis     Umbilicated papule    Erythematous-base heme-crusted tan verrucoid plaque consistent with inflamed seborrheic keratosis     Erythematous Silvery Scaling Plaque c/w Psoriasis     See annotation      Assessment / Plan:        Pityrosporum folliculitis  Bacterial folliculitis  -     fluconazole (DIFLUCAN) 200 MG Tab; Take 200mg once weekly x 2 months  Dispense: 16 tablet; Refill: 0  -     ketoconazole  (NIZORAL) 2 % cream; Apply topically 2 (two) times daily.  Dispense: 60 g; Refill: 1  Complete doxycycline 100mg once daily x 1 month  Use ketoconazole shampoo has wash on back- sit for 5 minutes  Also use keto shampoo on scalp, face, neck daily  Add ketoconazole cream at least once daily to AA  Discussed ideally he would take diflucan once daily for 10-14 days vs. Itraconazole however he takes trazadone daily- discussed interaction, no longer takes valium as listed in his chart  Discussed that eruption may come and go  Not a good candidate for isotretinoin as he has severe UC    Sebaceous hyperplasia  Rosacea  Offered cream such as tretinoin or metrogel- he declines today           No follow-ups on file.

## 2023-10-16 NOTE — PATIENT INSTRUCTIONS
Diflucan once weekly for 2 months  Finish month of doxycycline  Ketoconazole wash daily in the shower  Ketoconazole cream nightly

## 2023-10-23 DIAGNOSIS — R60.0 BILATERAL LEG EDEMA: ICD-10-CM

## 2023-10-23 RX ORDER — SPIRONOLACTONE 25 MG/1
TABLET ORAL
Qty: 90 TABLET | Refills: 10 | OUTPATIENT
Start: 2023-10-23

## 2023-10-23 NOTE — TELEPHONE ENCOUNTER
Care Due:                  Date            Visit Type   Department     Provider  --------------------------------------------------------------------------------                                EP -                              PRIMARY      [facility]  Last Visit: 06-      CARE ([facility])   PRIMARY CARE   Otto Bruce  Next Visit: None Scheduled  None         None Found                                                            Last  Test          Frequency    Reason                     Performed    Due Date  --------------------------------------------------------------------------------    Uric Acid...  12 months..  allopurinoL..............  Not Found    Overdue    [employer] Embedded Care Due Messages. Reference number: [id number].   10/23/2023 9:01:27 AM CDT

## 2023-10-23 NOTE — TELEPHONE ENCOUNTER
Provider Staff:  Action required for this patient     Please see care gap opportunities below in Care Due Message.    Thanks!  [facility]     Appointments      Date Provider   Last Visit   6/2/2023 Otto Bruce MD   Next Visit   Visit date not found Otto Bruce MD       Refill Decision Note  Quick DC. Inappropriate Request       Mina Zelaya  is requesting a refill authorization.  Brief Assessment and Rationale for Refill:  Quick Discontinue     Medication Therapy Plan:  The original prescription was discontinued on 2/14/2023 by Otto Bruce      Comments:     Note composed:10:05 AM 10/23/2023

## 2023-11-14 ENCOUNTER — OFFICE VISIT (OUTPATIENT)
Dept: PRIMARY CARE CLINIC | Facility: CLINIC | Age: 58
End: 2023-11-14
Payer: MEDICARE

## 2023-11-14 VITALS
WEIGHT: 253.5 LBS | HEIGHT: 70 IN | RESPIRATION RATE: 18 BRPM | BODY MASS INDEX: 36.29 KG/M2 | DIASTOLIC BLOOD PRESSURE: 60 MMHG | OXYGEN SATURATION: 97 % | SYSTOLIC BLOOD PRESSURE: 132 MMHG | HEART RATE: 78 BPM

## 2023-11-14 DIAGNOSIS — H91.92 HEARING LOSS OF LEFT EAR, UNSPECIFIED HEARING LOSS TYPE: ICD-10-CM

## 2023-11-14 DIAGNOSIS — H60.502 ACUTE OTITIS EXTERNA OF LEFT EAR, UNSPECIFIED TYPE: ICD-10-CM

## 2023-11-14 DIAGNOSIS — Z23 ENCOUNTER FOR VACCINATION: Primary | ICD-10-CM

## 2023-11-14 PROCEDURE — G0008 ADMIN INFLUENZA VIRUS VAC: HCPCS | Mod: HCNC,S$GLB,, | Performed by: INTERNAL MEDICINE

## 2023-11-14 PROCEDURE — 3078F PR MOST RECENT DIASTOLIC BLOOD PRESSURE < 80 MM HG: ICD-10-PCS | Mod: HCNC,CPTII,S$GLB, | Performed by: INTERNAL MEDICINE

## 2023-11-14 PROCEDURE — 99999 PR PBB SHADOW E&M-EST. PATIENT-LVL III: CPT | Mod: PBBFAC,HCNC,, | Performed by: INTERNAL MEDICINE

## 2023-11-14 PROCEDURE — 99213 PR OFFICE/OUTPT VISIT, EST, LEVL III, 20-29 MIN: ICD-10-PCS | Mod: HCNC,S$GLB,, | Performed by: INTERNAL MEDICINE

## 2023-11-14 PROCEDURE — 1159F PR MEDICATION LIST DOCUMENTED IN MEDICAL RECORD: ICD-10-PCS | Mod: HCNC,CPTII,S$GLB, | Performed by: INTERNAL MEDICINE

## 2023-11-14 PROCEDURE — 3075F PR MOST RECENT SYSTOLIC BLOOD PRESS GE 130-139MM HG: ICD-10-PCS | Mod: HCNC,CPTII,S$GLB, | Performed by: INTERNAL MEDICINE

## 2023-11-14 PROCEDURE — 99213 OFFICE O/P EST LOW 20 MIN: CPT | Mod: HCNC,S$GLB,, | Performed by: INTERNAL MEDICINE

## 2023-11-14 PROCEDURE — 3008F BODY MASS INDEX DOCD: CPT | Mod: HCNC,CPTII,S$GLB, | Performed by: INTERNAL MEDICINE

## 2023-11-14 PROCEDURE — 3078F DIAST BP <80 MM HG: CPT | Mod: HCNC,CPTII,S$GLB, | Performed by: INTERNAL MEDICINE

## 2023-11-14 PROCEDURE — G0008 FLU VACCINE (QUAD) GREATER THAN OR EQUAL TO 3YO PRESERVATIVE FREE IM: ICD-10-PCS | Mod: HCNC,S$GLB,, | Performed by: INTERNAL MEDICINE

## 2023-11-14 PROCEDURE — 3075F SYST BP GE 130 - 139MM HG: CPT | Mod: HCNC,CPTII,S$GLB, | Performed by: INTERNAL MEDICINE

## 2023-11-14 PROCEDURE — 3044F PR MOST RECENT HEMOGLOBIN A1C LEVEL <7.0%: ICD-10-PCS | Mod: HCNC,CPTII,S$GLB, | Performed by: INTERNAL MEDICINE

## 2023-11-14 PROCEDURE — 90686 IIV4 VACC NO PRSV 0.5 ML IM: CPT | Mod: HCNC,S$GLB,, | Performed by: INTERNAL MEDICINE

## 2023-11-14 PROCEDURE — 99999 PR PBB SHADOW E&M-EST. PATIENT-LVL III: ICD-10-PCS | Mod: PBBFAC,HCNC,, | Performed by: INTERNAL MEDICINE

## 2023-11-14 PROCEDURE — 3008F PR BODY MASS INDEX (BMI) DOCUMENTED: ICD-10-PCS | Mod: HCNC,CPTII,S$GLB, | Performed by: INTERNAL MEDICINE

## 2023-11-14 PROCEDURE — 3044F HG A1C LEVEL LT 7.0%: CPT | Mod: HCNC,CPTII,S$GLB, | Performed by: INTERNAL MEDICINE

## 2023-11-14 PROCEDURE — 90686 FLU VACCINE (QUAD) GREATER THAN OR EQUAL TO 3YO PRESERVATIVE FREE IM: ICD-10-PCS | Mod: HCNC,S$GLB,, | Performed by: INTERNAL MEDICINE

## 2023-11-14 PROCEDURE — 1159F MED LIST DOCD IN RCRD: CPT | Mod: HCNC,CPTII,S$GLB, | Performed by: INTERNAL MEDICINE

## 2023-11-14 RX ORDER — NEOMYCIN SULFATE, POLYMYXIN B SULFATE AND HYDROCORTISONE 10; 3.5; 1 MG/ML; MG/ML; [USP'U]/ML
3 SUSPENSION/ DROPS AURICULAR (OTIC) 3 TIMES DAILY
Qty: 10 ML | Refills: 0 | Status: SHIPPED | OUTPATIENT
Start: 2023-11-14

## 2023-11-14 RX ORDER — PREDNISONE 20 MG/1
20 TABLET ORAL 2 TIMES DAILY
Qty: 8 TABLET | Refills: 0 | Status: SHIPPED | OUTPATIENT
Start: 2023-11-14

## 2023-11-14 NOTE — PROGRESS NOTES
Patient was identified by name and . Allergies were reviewed. Monisha administered Flu vaccine IM using aseptic technique.

## 2023-11-15 NOTE — PROGRESS NOTES
Subjective:       Patient ID: Mina Zealya is a 58 y.o. male.    Chief Complaint: Ear Fullness    Ear Fullness       Pt with multiple medical conditions pt's main c/o tofday is his left ear he felt fullness in era and hearing loss left ear he ry clean with water and H2O2 and peroxide some greeenish d/c sl tender but his hearing left ear is not back yet no fever chill he has nasal congestion drip no h/o hearing loss in past  Review of Systems    Objective:      Physical Exam  Vitals and nursing note reviewed.   Constitutional:       General: He is not in acute distress.     Appearance: He is well-developed.   HENT:      Head: Normocephalic and atraumatic.      Right Ear: External ear normal.      Left Ear: External ear normal.      Ears:      Comments: Left ear canal sl erythema with greenish drainage no wax minimal tenderness with tration on ear lobes     Nose: Nose normal.      Mouth/Throat:      Pharynx: No oropharyngeal exudate.   Eyes:      Extraocular Movements: Extraocular movements intact.      Conjunctiva/sclera: Conjunctivae normal.      Pupils: Pupils are equal, round, and reactive to light.   Neck:      Thyroid: No thyromegaly.   Cardiovascular:      Rate and Rhythm: Normal rate and regular rhythm.      Heart sounds: Normal heart sounds. No murmur heard.     No friction rub. No gallop.   Pulmonary:      Effort: Pulmonary effort is normal. No respiratory distress.      Breath sounds: Normal breath sounds. No wheezing or rales.   Chest:      Chest wall: No tenderness.   Abdominal:      General: Bowel sounds are normal. There is no distension.      Palpations: Abdomen is soft.      Tenderness: There is no abdominal tenderness.   Musculoskeletal:         General: No tenderness or deformity. Normal range of motion.      Cervical back: Normal range of motion and neck supple.   Lymphadenopathy:      Cervical: No cervical adenopathy.   Skin:     General: Skin is warm and dry.      Findings: No erythema or rash.    Neurological:      Mental Status: He is alert and oriented to person, place, and time.   Psychiatric:         Mood and Affect: Mood normal.         Thought Content: Thought content normal.         Judgment: Judgment normal.         Assessment:       1. Encounter for vaccination    2. Acute otitis externa of left ear, unspecified type    3. Hearing loss of left ear, unspecified hearing loss type        Plan:       Encounter for vaccination  -     Influenza - Quadrivalent *Preferred* (6 months+) (PF)    Acute otitis externa of left ear, unspecified type  -     neomycin-polymyxin-hydrocortisone (CORTISPORIN) 3.5-10,000-1 mg/mL-unit/mL-% otic suspension; Place 3 drops into the left ear 3 (three) times daily.  Dispense: 10 mL; Refill: 0    Hearing loss of left ear, unspecified hearing loss type  Comments:  ENT consult if not better  Orders:  -     predniSONE (DELTASONE) 20 MG tablet; Take 1 tablet (20 mg total) by mouth 2 (two) times daily.  Dispense: 8 tablet; Refill: 0        Medication List with Changes/Refills   New Medications    NEOMYCIN-POLYMYXIN-HYDROCORTISONE (CORTISPORIN) 3.5-10,000-1 MG/ML-UNIT/ML-% OTIC SUSPENSION    Place 3 drops into the left ear 3 (three) times daily.    PREDNISONE (DELTASONE) 20 MG TABLET    Take 1 tablet (20 mg total) by mouth 2 (two) times daily.   Current Medications    ALBUTEROL (PROVENTIL/VENTOLIN HFA) 90 MCG/ACTUATION INHALER    2 puffs every 4 hours as needed for cough, wheeze, or shortness of breath    ALBUTEROL-IPRATROPIUM (DUO-NEB) 2.5 MG-0.5 MG/3 ML NEBULIZER SOLUTION    Take 3 mLs by nebulization every 6 (six) hours as needed for Wheezing or Shortness of Breath. Rescue    ALLOPURINOL (ZYLOPRIM) 100 MG TABLET    TAKE 1 TABLET EVERY DAY    ALLOPURINOL (ZYLOPRIM) 300 MG TABLET    TAKE 1 TABLET EVERY DAY    ASCORBIC ACID, VITAMIN C, (VITAMIN C) 1000 MG TABLET    Take 1,000 mg by mouth once daily.    ASPIRIN (ECOTRIN) 81 MG EC TABLET    Take 81 mg by mouth once daily.     BUMETANIDE (BUMEX) 2 MG TABLET    TAKE 1 TABLET EVERY DAY    CALCIUM CARBONATE (OS-VÍCTOR) 600 MG (1,500 MG) TAB    Take 600 mg by mouth once daily.    CHLORHEXIDINE (HIBICLENS) 4 % EXTERNAL LIQUID    WASH DAILY FOR 3 DAYS THEN 3 DAYS PER WEEK FOR 2 WEEKS- REPEAT AS NEEDED    CYANOCOBALAMIN (VITAMIN B-12) 100 MCG TABLET    Take 100 mcg by mouth once daily.    DIAZEPAM (VALIUM) 10 MG TAB        DOXYCYCLINE (VIBRAMYCIN) 100 MG CAP    Take 1 capsule (100 mg total) by mouth once daily.    FISH OIL-OMEGA-3 FATTY ACIDS 300-1,000 MG CAPSULE    Take 1,200 mg by mouth once daily.    FLUCONAZOLE (DIFLUCAN) 200 MG TAB    Take 200mg once weekly x 2 months    HYDROCODONE-ACETAMINOPHEN (NORCO)  MG PER TABLET    Take 1 tablet by mouth every 8 (eight) hours as needed for Pain.     MG TABLET    TAKE 1 TABLET THREE TIMES DAILY    KETOCONAZOLE (NIZORAL) 2 % CREAM    Apply topically 2 (two) times daily.    KETOCONAZOLE (NIZORAL) 2 % SHAMPOO    Apply topically 3 (three) times a week.    LORATADINE (CLARITIN) 10 MG TABLET    Take 10 mg by mouth once daily.    METFORMIN (GLUCOPHAGE-XR) 500 MG ER 24HR TABLET    TAKE 1 TABLET EVERY MORNING WITH BREAKFAST    MONTELUKAST (SINGULAIR) 10 MG TABLET    TAKE 1 TABLET EVERY EVENING    MUPIROCIN (BACTROBAN) 2 % OINTMENT    Apply topically 3 (three) times daily.    POTASSIUM CHLORIDE SA (K-DUR,KLOR-CON) 20 MEQ TABLET    TAKE 2 TABLETS TWICE A DAY    ROSUVASTATIN (CRESTOR) 5 MG TABLET    TAKE 1 TABLET EVERY DAY    SHINGRIX, PF, 50 MCG/0.5 ML INJECTION        TIOTROPIUM-OLODATEROL (STIOLTO RESPIMAT) 2.5-2.5 MCG/ACTUATION MIST    INHALE 2 PUFFS INTO THE LUNGS EVERY DAY    TOFACITINIB (XELJANZ XR) 11 MG TB24    Take 11 mg by mouth once daily.    TRAZODONE (DESYREL) 100 MG TABLET    TAKE 1 TABLET AT BEDTIME

## 2023-12-21 ENCOUNTER — PATIENT OUTREACH (OUTPATIENT)
Dept: ADMINISTRATIVE | Facility: HOSPITAL | Age: 58
End: 2023-12-21
Payer: MEDICARE

## 2023-12-21 NOTE — PROGRESS NOTES
Health Maintenance Due   Topic Date Due    RSV Vaccine (Age 60+ and Pregnant patients) (1 - 1-dose 60+ series) Never done    PROSTATE-SPECIFIC ANTIGEN  2023     Population Health Chart Review & Patient Outreach Details      Further Action Needed If Patient Returns Outreach:      MA Gap Report  reviewed, medication adherence for statin therapy reviewed. Patient currently takes Rosuvastatin 5 mg q daily and is compliant with medication. Dispense history, 81%.  ASHLEY sent to Dr. Sergio Elias for most recent colonoscopy records.      Updates Requested / Reviewed:     [x]  Care Everywhere    [x]     [x]  External Sources ([facility], [facility], [facility], etc.)    [x] [facility]   [x] [facility]   [] Other:    [x]  Care Team Updated   []  Removed  or Duplicate Orders   [x]  Immunization Reconciliation Completed / Queried    [x] [place]   [] [place]   [] [place]   [] [place]      Health Maintenance Topics Addressed and Outreach Outcomes / Actions Taken:             Breast Cancer Screening []  Mammogram Order Placed    []  Mammogram Screening Scheduled    []  External Records Requested & Care Team Updated if Applicable    []  External Records Uploaded & Care Team Updated if Applicable    []  Pt Declined Scheduling Mammogram    []  Pt Will Schedule with External Provider / Order Routed & Care Team Updated if Applicable              Cervical Cancer Screening []  Pap Smear Scheduled in Primary Care or OBGYN    []  External Records Requested & Care Team Updated if Applicable       []  External Records Uploaded, Care Team Updated, & History Updated if Applicable    []  Patient Declined Scheduling Pap Smear    []  Patient Will Schedule with External Provider & Care Team Updated if Applicable                  Colorectal Cancer Screening []  Colonoscopy Case Request / Referral / Home Test Order Placed    [x]  External Records Requested & Care Team Updated if Applicable    []  External Records Uploaded, Care Team  Updated, & History Updated if Applicable    []  Patient Declined Completing Colon Cancer Screening    []  Patient Will Schedule with External Provider & Care Team Updated if Applicable    []  Fit Kit Mailed (add the SmartPhrase under additional notes)    []  Reminded Patient to Complete Home Test                Diabetic Eye Exam []  Eye Exam Screening Order Placed    []  Eye Camera Scheduled or Optometry/Ophthalmology Referral Placed    []  External Records Requested & Care Team Updated if Applicable    []  External Records Uploaded, Care Team Updated, & History Updated if Applicable    []  Patient Declined Scheduling Eye Exam    []  Patient Will Schedule with External Provider & Care Team Updated if Applicable             Blood Pressure Control []  Primary Care Follow Up Visit Scheduled     []  Remote Blood Pressure Reading Captured    []  Patient Declined Remote Reading or Scheduling Appt - Escalated to PCP    []  Patient Will Call Back or Send Portal Message with Reading                 HbA1c & Other Labs []  Overdue Lab(s) Ordered    []  Overdue Lab(s) Scheduled    []  External Records Uploaded & Care Team Updated if Applicable    []  Primary Care Follow Up Visit Scheduled     []  Reminded Patient to Complete A1c Home Test    []  Patient Declined Scheduling Labs or Will Call Back to Schedule    []  Patient Will Schedule with External Provider / Order Routed, & Care Team Updated if Applicable           Primary Care Appointment []  Primary Care Appt Scheduled    []  Patient Declined Scheduling or Will Call Back to Schedule    []  Pt Established with External Provider, Updated Care Team, & Informed Pt to Notify Payor if Applicable           Medication Adherence /    Statin Use []  Primary Care Appointment Scheduled    []  Patient Reminded to  Prescription    []  Patient Declined, Provider Notified if Needed    []  Sent Provider Message to Review to Evaluate Pt for Statin, Add Exclusion Dx Codes, Document    Exclusion in Problem List, Change Statin Intensity Level to Moderate or High Intensity if Applicable                Osteoporosis Screening []  Dexa Order Placed    []  Dexa Appointment Scheduled    []  External Records Requested & Care Team Updated    []  External Records Uploaded, Care Team Updated, & History Updated if Applicable    []  Patient Declined Scheduling Dexa or Will Call Back to Schedule    []  Patient Will Schedule with External Provider / Order Routed & Care Team Updated if Applicable       Additional Notes:

## 2023-12-21 NOTE — LETTER
AUTHORIZATION FOR RELEASE OF   CONFIDENTIAL INFORMATION    Dear Dr. Elias,    We are seeing Mina Zelaya, date of birth 1965, in the clinic at [facility]. Otto Bruce MD is the patient's PCP. Mina Zelaya has an outstanding lab/procedure at the time we reviewed his chart. In order to help keep his health information updated, he has authorized us to request the following medical record(s):        (  )  MAMMOGRAM                                      (X)  COLONOSCOPY      (  )  PAP SMEAR                                          (  )  OUTSIDE LAB RESULTS     (  )  DEXA SCAN                                          (  )  EYE EXAM            (  )  FOOT EXAM                                          (  )  ENTIRE RECORD     (  )  OUTSIDE IMMUNIZATIONS                 (  )  _______________       ATTN: JENN      Please fax records to Otto Bruce MD, [phone]     If you have any questions, please contact Jenn at [phone].           Patient Name: Mina Zelaya  : 1965  Patient Phone #: [phone]

## 2023-12-22 ENCOUNTER — PATIENT OUTREACH (OUTPATIENT)
Dept: ADMINISTRATIVE | Facility: HOSPITAL | Age: 58
End: 2023-12-22
Payer: MEDICARE

## 2023-12-22 NOTE — PROGRESS NOTES
Health Maintenance Due   Topic Date Due    RSV Vaccine (Age 60+ and Pregnant patients) (1 - 1-dose 60+ series) Never done    PROSTATE-SPECIFIC ANTIGEN  2023     Population Health Chart Review & Patient Outreach Details      Further Action Needed If Patient Returns Outreach:      Received colonoscopy/sigmoidoscopy records done 2019, uploaded.  updated.       Updates Requested / Reviewed:     [x]  Care Everywhere    []     []  External Sources ([facility], [facility], [facility], etc.)    [] [facility]   [] [facility]   [] Other:    [x]  Care Team Updated   []  Removed  or Duplicate Orders   [x]  Immunization Reconciliation Completed / Queried    [x] [place]   [] [place]   [] [place]   [] [place]      Health Maintenance Topics Addressed and Outreach Outcomes / Actions Taken:             Breast Cancer Screening []  Mammogram Order Placed    []  Mammogram Screening Scheduled    []  External Records Requested & Care Team Updated if Applicable    []  External Records Uploaded & Care Team Updated if Applicable    []  Pt Declined Scheduling Mammogram    []  Pt Will Schedule with External Provider / Order Routed & Care Team Updated if Applicable              Cervical Cancer Screening []  Pap Smear Scheduled in Primary Care or OBGYN    []  External Records Requested & Care Team Updated if Applicable       []  External Records Uploaded, Care Team Updated, & History Updated if Applicable    []  Patient Declined Scheduling Pap Smear    []  Patient Will Schedule with External Provider & Care Team Updated if Applicable                  Colorectal Cancer Screening []  Colonoscopy Case Request / Referral / Home Test Order Placed    []  External Records Requested & Care Team Updated if Applicable    [x]  External Records Uploaded, Care Team Updated, & History Updated if Applicable    []  Patient Declined Completing Colon Cancer Screening    []  Patient Will Schedule with External Provider & Care Team Updated if  Applicable    []  Fit Kit Mailed (add the SmartPhrase under additional notes)    []  Reminded Patient to Complete Home Test                Diabetic Eye Exam []  Eye Exam Screening Order Placed    []  Eye Camera Scheduled or Optometry/Ophthalmology Referral Placed    []  External Records Requested & Care Team Updated if Applicable    []  External Records Uploaded, Care Team Updated, & History Updated if Applicable    []  Patient Declined Scheduling Eye Exam    []  Patient Will Schedule with External Provider & Care Team Updated if Applicable             Blood Pressure Control []  Primary Care Follow Up Visit Scheduled     []  Remote Blood Pressure Reading Captured    []  Patient Declined Remote Reading or Scheduling Appt - Escalated to PCP    []  Patient Will Call Back or Send Portal Message with Reading                 HbA1c & Other Labs []  Overdue Lab(s) Ordered    []  Overdue Lab(s) Scheduled    []  External Records Uploaded & Care Team Updated if Applicable    []  Primary Care Follow Up Visit Scheduled     []  Reminded Patient to Complete A1c Home Test    []  Patient Declined Scheduling Labs or Will Call Back to Schedule    []  Patient Will Schedule with External Provider / Order Routed, & Care Team Updated if Applicable           Primary Care Appointment []  Primary Care Appt Scheduled    []  Patient Declined Scheduling or Will Call Back to Schedule    []  Pt Established with External Provider, Updated Care Team, & Informed Pt to Notify Payor if Applicable           Medication Adherence /    Statin Use []  Primary Care Appointment Scheduled    []  Patient Reminded to  Prescription    []  Patient Declined, Provider Notified if Needed    []  Sent Provider Message to Review to Evaluate Pt for Statin, Add Exclusion Dx Codes, Document   Exclusion in Problem List, Change Statin Intensity Level to Moderate or High Intensity if Applicable                Osteoporosis Screening []  Dexa Order Placed    []  Dexa  Appointment Scheduled    []  External Records Requested & Care Team Updated    []  External Records Uploaded, Care Team Updated, & History Updated if Applicable    []  Patient Declined Scheduling Dexa or Will Call Back to Schedule    []  Patient Will Schedule with External Provider / Order Routed & Care Team Updated if Applicable       Additional Notes:

## 2024-01-02 ENCOUNTER — OFFICE VISIT (OUTPATIENT)
Dept: DERMATOLOGY | Facility: CLINIC | Age: 59
End: 2024-01-02
Payer: MEDICARE

## 2024-01-02 DIAGNOSIS — B95.8 STAPH INFECTION: Primary | ICD-10-CM

## 2024-01-02 DIAGNOSIS — R21 RASH: ICD-10-CM

## 2024-01-02 PROCEDURE — 1159F MED LIST DOCD IN RCRD: CPT | Mod: CPTII,S$GLB,, | Performed by: STUDENT IN AN ORGANIZED HEALTH CARE EDUCATION/TRAINING PROGRAM

## 2024-01-02 PROCEDURE — 99214 OFFICE O/P EST MOD 30 MIN: CPT | Mod: S$GLB,,, | Performed by: STUDENT IN AN ORGANIZED HEALTH CARE EDUCATION/TRAINING PROGRAM

## 2024-01-02 PROCEDURE — 1160F RVW MEDS BY RX/DR IN RCRD: CPT | Mod: CPTII,S$GLB,, | Performed by: STUDENT IN AN ORGANIZED HEALTH CARE EDUCATION/TRAINING PROGRAM

## 2024-01-02 PROCEDURE — 87070 CULTURE OTHR SPECIMN AEROBIC: CPT | Mod: HCNC | Performed by: STUDENT IN AN ORGANIZED HEALTH CARE EDUCATION/TRAINING PROGRAM

## 2024-01-02 RX ORDER — KETOCONAZOLE 20 MG/ML
SHAMPOO, SUSPENSION TOPICAL
Qty: 120 ML | Refills: 3 | Status: SHIPPED | OUTPATIENT
Start: 2024-01-03

## 2024-01-02 RX ORDER — ITRACONAZOLE 100 MG/1
CAPSULE ORAL
Qty: 16 CAPSULE | Refills: 0 | Status: SHIPPED | OUTPATIENT
Start: 2024-01-02

## 2024-01-02 RX ORDER — MUPIROCIN 20 MG/G
OINTMENT TOPICAL
Qty: 22 G | Refills: 2 | Status: SHIPPED | OUTPATIENT
Start: 2024-01-02

## 2024-01-02 NOTE — PROGRESS NOTES
Subjective:      Patient ID:  Mina Zelaya is a 58 y.o. 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 Does not go away for long. Washes with ketoconazole daily, mainly on scalp. Does not use ketoconazole cream often, uses Neosporin. Completed Doxycycline. Patient states he is taking Diflucan once a week x 2 months. States he has been feeling null and his heart has been racing - feels this is due to the Diflucan.    Final Pathologic Diagnosis     Skin, left upper arm, punch biopsy:   -PITYROSPORUM AND BACTERIAL FOLLICULITIS, see comment      COMMENT:  Clinical images were reviewed in epic and differential diagnosis is noted.  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  Correlation with culture is   recommended.     Hx of ulcerative cholitis- on xeljanz for years- Dr. Sergio Elias      Current Outpatient Medications:   ·  albuterol (PROVENTIL/VENTOLIN HFA) 90 mcg/actuation inhaler, 2 puffs every 4 hours as needed for cough, wheeze, or shortness of breath, Disp: 18 g, Rfl: 3  ·  allopurinoL (ZYLOPRIM) 100 MG tablet, TAKE 1 TABLET EVERY DAY, Disp: 90 tablet, Rfl: 3  ·  allopurinoL (ZYLOPRIM) 300 MG tablet, TAKE 1 TABLET EVERY DAY, Disp: 90 tablet, Rfl: 3  ·  ascorbic acid, vitamin C, (VITAMIN C) 1000 MG tablet, Take 1,000 mg by mouth once daily., Disp: , Rfl:   ·  aspirin (ECOTRIN) 81 MG EC tablet, Take 81 mg by mouth once daily., Disp: , Rfl:   ·  bumetanide (BUMEX) 2 MG tablet, TAKE 1 TABLET EVERY DAY, Disp: 90 tablet, Rfl: 2  ·  calcium carbonate (OS-VÍCTOR) 600 mg (1,500 mg) Tab, Take 600 mg by mouth once daily., Disp: , Rfl:   ·  chlorhexidine (HIBICLENS) 4 % external liquid, WASH DAILY FOR 3 DAYS THEN 3 DAYS PER WEEK FOR 2 WEEKS- REPEAT AS NEEDED, Disp: 236 mL, Rfl:  1  ·  cyanocobalamin (VITAMIN B-12) 100 MCG tablet, Take 100 mcg by mouth once daily., Disp: , Rfl:   ·  diazePAM (VALIUM) 10 MG Tab, , Disp: , Rfl:   ·  doxycycline (VIBRAMYCIN) 100 MG Cap, Take 1 capsule (100 mg total) by mouth once daily., Disp: 30 capsule, Rfl: 1  ·  fish oil-omega-3 fatty acids 300-1,000 mg capsule, Take 1,200 mg by mouth once daily., Disp: , Rfl:   ·  fluconazole (DIFLUCAN) 200 MG Tab, Take 200mg once weekly x 2 months, Disp: 16 tablet, Rfl: 0  ·  HYDROcodone-acetaminophen (NORCO)  mg per tablet, Take 1 tablet by mouth every 8 (eight) hours as needed for Pain., Disp: 90 tablet, Rfl: 0  ·   mg tablet, TAKE 1 TABLET THREE TIMES DAILY, Disp: 270 tablet, Rfl: 1  ·  ketoconazole (NIZORAL) 2 % cream, Apply topically 2 (two) times daily., Disp: 60 g, Rfl: 1  ·  ketoconazole (NIZORAL) 2 % shampoo, Apply topically 3 (three) times a week., Disp: 120 mL, Rfl: 3  ·  loratadine (CLARITIN) 10 mg tablet, Take 10 mg by mouth once daily., Disp: , Rfl:   ·  metFORMIN (GLUCOPHAGE-XR) 500 MG ER 24hr tablet, TAKE 1 TABLET EVERY MORNING WITH BREAKFAST, Disp: 90 tablet, Rfl: 1  ·  montelukast (SINGULAIR) 10 mg tablet, TAKE 1 TABLET EVERY EVENING, Disp: 90 tablet, Rfl: 3  ·  mupirocin (BACTROBAN) 2 % ointment, Apply topically 3 (three) times daily., Disp: 22 g, Rfl: 2  ·  neomycin-polymyxin-hydrocortisone (CORTISPORIN) 3.5-10,000-1 mg/mL-unit/mL-% otic suspension, Place 3 drops into the left ear 3 (three) times daily., Disp: 10 mL, Rfl: 0  ·  potassium chloride SA (K-DUR,KLOR-CON) 20 MEQ tablet, TAKE 2 TABLETS TWICE A DAY, Disp: 360 tablet, Rfl: 3  ·  predniSONE (DELTASONE) 20 MG tablet, Take 1 tablet (20 mg total) by mouth 2 (two) times daily., Disp: 8 tablet, Rfl: 0  ·  rosuvastatin (CRESTOR) 5 MG tablet, TAKE 1 TABLET EVERY DAY, Disp: 90 tablet, Rfl: 2  ·  SHINGRIX, PF, 50 mcg/0.5 mL injection, , Disp: , Rfl:   ·  tiotropium-olodateroL (STIOLTO RESPIMAT) 2.5-2.5 mcg/actuation Mist, INHALE 2 PUFFS INTO  THE LUNGS EVERY DAY, Disp: 24 g, Rfl: 3  ·  tofacitinib (XELJANZ XR) 11 mg Tb24, Take 11 mg by mouth once daily., Disp: , Rfl:   ·  traZODone (DESYREL) 100 MG tablet, TAKE 1 TABLET AT BEDTIME, Disp: 90 tablet, Rfl: 3  ·  albuterol-ipratropium (DUO-NEB) 2.5 mg-0.5 mg/3 mL nebulizer solution, Take 3 mLs by nebulization every 6 (six) hours as needed for Wheezing or Shortness of Breath. Rescue, Disp: 120 vial, Rfl: 5          Review of Systems   Constitutional:  Negative for fever, chills and fatigue.   Respiratory:  Negative for cough and shortness of breath.    Gastrointestinal:  Negative for nausea and vomiting.   Skin:  Positive for itching and rash.       Objective:   Physical Exam   Constitutional: He appears well-developed and well-nourished.   Neurological: He is alert and oriented to person, place, and time.   Psychiatric: He has a normal mood and affect.   Skin:   Areas Examined (abnormalities noted in diagram):   Head / Face Inspection Performed  Neck Inspection Performed  Chest / Axilla Inspection Performed  Abdomen Inspection Performed  Back Inspection Performed            Diagram Legend     Erythematous scaling macule/papule c/w actinic keratosis       Vascular papule c/w angioma      Pigmented verrucoid papule/plaque c/w seborrheic keratosis      Yellow umbilicated papule c/w sebaceous hyperplasia      Irregularly shaped tan macule c/w lentigo     1-2 mm smooth white papules consistent with Milia      Movable subcutaneous cyst with punctum c/w epidermal inclusion cyst      Subcutaneous movable cyst c/w pilar cyst      Firm pink to brown papule c/w dermatofibroma      Pedunculated fleshy papule(s) c/w skin tag(s)      Evenly pigmented macule c/w junctional nevus     Mildly variegated pigmented, slightly irregular-bordered macule c/w mildly atypical nevus      Flesh colored to evenly pigmented papule c/w intradermal nevus       Pink pearly papule/plaque c/w basal cell carcinoma      Erythematous  hyperkeratotic cursted plaque c/w SCC      Surgical scar with no sign of skin cancer recurrence      Open and closed comedones      Inflammatory papules and pustules      Verrucoid papule consistent consistent with wart     Erythematous eczematous patches and plaques     Dystrophic onycholytic nail with subungual debris c/w onychomycosis     Umbilicated papule    Erythematous-base heme-crusted tan verrucoid plaque consistent with inflamed seborrheic keratosis     Erythematous Silvery Scaling Plaque c/w Psoriasis     See annotation            Assessment / Plan:        Staph infection  -     mupirocin (BACTROBAN) 2 % ointment; Use in nose BID x 5 days of each month  Dispense: 22 g; Refill: 2    Rash  -     ketoconazole (NIZORAL) 2 % shampoo; Apply topically 3 (three) times a week.  Dispense: 120 mL; Refill: 3  -     itraconazole (SPORANOX) 100 mg Cap; Take 200mg twice weekly x 4 weeks  Dispense: 16 capsule; Refill: 0  Use ketoconazole shampoo as a wash daily in the shower  Benzoyl peroxide wash- panoxyl 10% wash  Apply mupirocin twice daily x 5 days of the month into nose  Check with gastroenterologist about taking itraconazole 200mg twice weekly x 4 weeks  Stop taking diflucan  Discussed that if he needs isotretinoin would need to speak with his GI doc.   Discussed that mood changes likely unrelated to Diflucan  Aware of interaction with xeljanz- will only be taking twice weekly- recommend he check with GI as well           No follow-ups on file.

## 2024-01-02 NOTE — PATIENT INSTRUCTIONS
Use ketoconazole shampoo as a wash daily in the shower  Benzoyl peroxide wash- panoxyl 10% wash  Apply mupirocin twice daily x 5 days of the month into nose  Check with gastroenterologist about taking itraconazole 200mg twice weekly x 4 weeks  Stop taking diflucan

## 2024-01-05 DIAGNOSIS — G47.33 OSA (OBSTRUCTIVE SLEEP APNEA): ICD-10-CM

## 2024-01-05 LAB — BACTERIA SPEC AEROBE CULT: NO GROWTH

## 2024-01-08 RX ORDER — ALBUTEROL SULFATE 90 UG/1
AEROSOL, METERED RESPIRATORY (INHALATION)
Qty: 54 G | Refills: 3 | Status: SHIPPED | OUTPATIENT
Start: 2024-01-08

## 2024-01-09 ENCOUNTER — TELEPHONE (OUTPATIENT)
Dept: PULMONOLOGY | Facility: CLINIC | Age: 59
End: 2024-01-09
Payer: MEDICARE

## 2024-01-09 NOTE — TELEPHONE ENCOUNTER
----- Message from Shelley Valentin sent at 1/8/2024 11:28 AM CST -----  Regarding: refill  Contact: pateint  Type:  RX Refill Request    Who Called:  patient  Refill or New Rx:  refill  RX Name and Strength:  albuterol (PROVENTIL/VENTOLIN HFA) 90 mcg/actuation inhaler  How is the patient currently taking it? (ex. 1XDay):  as directed  Is this a 30 day or 90 day RX:  90  Preferred Pharmacy with phone number:    [facility] - [place], [place] - [address]  [address] [address]  Phone: [phone] Fax: [phone]        Local or Mail Order:  local  Ordering Provider:  Dr Cali Simpson Call Back Number:  [phone] (home)     Additional Information:  Please call patient to advise.  Thanks!

## 2024-01-09 NOTE — TELEPHONE ENCOUNTER
Placed a call to the pt in regards to a refill on his Albuterol inhaler. Medication was ordered on 1/8/24. Called [facility], and spoke with Vanessa. The Medication will have to go through the pharmacist, then through the insurance, if there are anyl complications the pharmacy will let the clinic and pt know.

## 2024-03-05 ENCOUNTER — OFFICE VISIT (OUTPATIENT)
Dept: DERMATOLOGY | Facility: CLINIC | Age: 59
End: 2024-03-05
Payer: MEDICARE

## 2024-03-05 DIAGNOSIS — L73.9 FOLLICULITIS: Primary | ICD-10-CM

## 2024-03-05 PROCEDURE — 1160F RVW MEDS BY RX/DR IN RCRD: CPT | Mod: CPTII,S$GLB,, | Performed by: STUDENT IN AN ORGANIZED HEALTH CARE EDUCATION/TRAINING PROGRAM

## 2024-03-05 PROCEDURE — 99213 OFFICE O/P EST LOW 20 MIN: CPT | Mod: S$GLB,,, | Performed by: STUDENT IN AN ORGANIZED HEALTH CARE EDUCATION/TRAINING PROGRAM

## 2024-03-05 PROCEDURE — 1159F MED LIST DOCD IN RCRD: CPT | Mod: CPTII,S$GLB,, | Performed by: STUDENT IN AN ORGANIZED HEALTH CARE EDUCATION/TRAINING PROGRAM

## 2024-03-05 RX ORDER — ITRACONAZOLE 100 MG/1
CAPSULE ORAL
Qty: 8 CAPSULE | Refills: 1 | Status: SHIPPED | OUTPATIENT
Start: 2024-03-05 | End: 2024-04-22

## 2024-03-05 RX ORDER — CLINDAMYCIN PHOSPHATE 11.9 MG/ML
SOLUTION TOPICAL 2 TIMES DAILY
Qty: 60 ML | Refills: 1 | Status: SHIPPED | OUTPATIENT
Start: 2024-03-05

## 2024-03-05 RX ORDER — KETOCONAZOLE 20 MG/ML
SHAMPOO, SUSPENSION TOPICAL
Qty: 120 ML | Refills: 3 | Status: SHIPPED | OUTPATIENT
Start: 2024-03-06

## 2024-03-05 NOTE — PROGRESS NOTES
Subjective:      Patient ID:  Mina Zelaya is a 58 y.o. 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 Patient states he is still getting flares. Does not go away for long.   Takes 2 showers daily- uses ketoconazole shampoo on chest and back, benzoyl peroxide on face and chest once daily  Not using mupirocin in his nose.   He took the itraconazole 200mg twice weekly x 4 weeks  He wears cpap mask and gets bumps on his scalp.   Npt using ketoconazole cream.   Has not followed-up with his GI doc for UC in ~1 year as he states he feels well.   Previous treatments:   -doxycycline PO  - diflucan -had issues w/ pulse fluconazole       Final Pathologic Diagnosis     Skin, left upper arm, punch biopsy:   -PITYROSPORUM AND BACTERIAL FOLLICULITIS, see comment      COMMENT:  Clinical images were reviewed in epic and differential diagnosis is noted.  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  Correlation with culture is   recommended.     Hx of ulcerative cholitis- on xeljanz for years- Dr. Sergio Elias          Review of Systems   Constitutional:  Negative for fever, chills and fatigue.   Respiratory:  Negative for cough and shortness of breath.    Gastrointestinal:  Negative for nausea and vomiting.   Skin:  Positive for rash. Negative for itching.       Objective:   Physical Exam   Constitutional: He appears well-developed and well-nourished.   Neurological: He is alert and oriented to person, place, and time.   Psychiatric: He has a normal mood and affect.   Skin:   Areas Examined (abnormalities noted in diagram):   Scalp / Hair Palpated and Inspected  Neck Inspection Performed  Abdomen Inspection Performed  Back Inspection Performed  RUE Inspected  LUE Inspection Performed            Diagram Legend      Erythematous scaling macule/papule c/w actinic keratosis       Vascular papule c/w angioma      Pigmented verrucoid papule/plaque c/w seborrheic keratosis      Yellow umbilicated papule c/w sebaceous hyperplasia      Irregularly shaped tan macule c/w lentigo     1-2 mm smooth white papules consistent with Milia      Movable subcutaneous cyst with punctum c/w epidermal inclusion cyst      Subcutaneous movable cyst c/w pilar cyst      Firm pink to brown papule c/w dermatofibroma      Pedunculated fleshy papule(s) c/w skin tag(s)      Evenly pigmented macule c/w junctional nevus     Mildly variegated pigmented, slightly irregular-bordered macule c/w mildly atypical nevus      Flesh colored to evenly pigmented papule c/w intradermal nevus       Pink pearly papule/plaque c/w basal cell carcinoma      Erythematous hyperkeratotic cursted plaque c/w SCC      Surgical scar with no sign of skin cancer recurrence      Open and closed comedones      Inflammatory papules and pustules      Verrucoid papule consistent consistent with wart     Erythematous eczematous patches and plaques     Dystrophic onycholytic nail with subungual debris c/w onychomycosis     Umbilicated papule    Erythematous-base heme-crusted tan verrucoid plaque consistent with inflamed seborrheic keratosis     Erythematous Silvery Scaling Plaque c/w Psoriasis     See annotation            Assessment / Plan:        Folliculitis- biopsy showed bacterial and pityrosporum, bacterial culture negative  -     clindamycin (CLEOCIN T) 1 % external solution; Apply topically 2 (two) times daily.  Dispense: 60 mL; Refill: 1  -     ketoconazole (NIZORAL) 2 % shampoo; Apply topically 3 (three) times a week.  Dispense: 120 mL; Refill: 3  -     itraconazole (SPORANOX) 100 mg Cap; Take 200mg twice weekly x 2 weeks during flare  Dispense: 8 capsule; Refill: 1  Start clindamycin BID on scalp  Continue to wash scalp chest, back with ketoconazole shampoo   Increase use of BPO  wash to scalp, chest, back  Can take itraconazole BIW for flares  Xeljanz may be contributing either from immune suppression or SE (acneiform eruption from xeljanz)  Recommend he follow-up with his GI as he has not had regular labs or monitoring for xeljanz  Will avoid isotretinoin given UC           No follow-ups on file.

## 2024-03-06 ENCOUNTER — TELEPHONE (OUTPATIENT)
Dept: PRIMARY CARE CLINIC | Facility: CLINIC | Age: 59
End: 2024-03-06
Payer: MEDICARE

## 2024-03-06 ENCOUNTER — PATIENT MESSAGE (OUTPATIENT)
Dept: PRIMARY CARE CLINIC | Facility: CLINIC | Age: 59
End: 2024-03-06
Payer: MEDICARE

## 2024-03-06 NOTE — TELEPHONE ENCOUNTER
----- Message from Tahira Flores sent at 3/6/2024  8:31 AM CST -----  Contact: [phone]  Symptom: Cough  Outcome: Schedule an appointment to be seen within 24 hours.  Reason: Caller denied all higher acuity questions    The caller accepted this outcome    Sent per symptom tree nothing until tomorrow and he will be out of town

## 2024-03-06 NOTE — TELEPHONE ENCOUNTER
Called pt regarding message. Pt stated he has a cough and post nasal drip. Symptoms started 3 weeks ago. No fever, chills, or sore throat reported. Offered pt e-visit, pt declined. Pt is requesting  medication.

## 2024-03-07 NOTE — TELEPHONE ENCOUNTER
Informed pt sent E-visit info through the portal.  He can complete this or he can go to urgent care. Pt verbalized understanding.

## 2024-04-10 DIAGNOSIS — R73.03 BORDERLINE DIABETES: ICD-10-CM

## 2024-04-10 DIAGNOSIS — E87.6 HYPOKALEMIA: ICD-10-CM

## 2024-04-10 NOTE — TELEPHONE ENCOUNTER
Care Due:                  Date            Visit Type   Department     Provider  --------------------------------------------------------------------------------                                 -                              PRIMARY [facility]  Last Visit: 11-      CARE ([facility])   PRIMARY CARE   Jb Orosco                              EP - PRIMARY [facility]  Next Visit: 04-      CARE ([facility])   PRIMARY CARE   Otto Bruce                                                            Last  Test          Frequency    Reason                     Performed    Due Date  --------------------------------------------------------------------------------    HBA1C.......  6 months...  metFORMIN................  06- 11-    Lipid Panel.  12 months..  rosuvastatin.............  11- 11-    Uric Acid...  12 months..  allopurinoL..............  Not Found    Overdue    [employer] Embedded Care Due Messages. Reference number: [id number].   4/10/2024 11:33:30 AM CDT

## 2024-04-11 RX ORDER — ALLOPURINOL 300 MG/1
TABLET ORAL
Qty: 90 TABLET | Refills: 0 | Status: SHIPPED | OUTPATIENT
Start: 2024-04-11

## 2024-04-11 RX ORDER — POTASSIUM CHLORIDE 20 MEQ/1
TABLET, EXTENDED RELEASE ORAL
Qty: 360 TABLET | Refills: 0 | Status: SHIPPED | OUTPATIENT
Start: 2024-04-11

## 2024-04-11 RX ORDER — ALLOPURINOL 100 MG/1
TABLET ORAL
Qty: 90 TABLET | Refills: 0 | Status: SHIPPED | OUTPATIENT
Start: 2024-04-11

## 2024-04-11 RX ORDER — BUMETANIDE 2 MG/1
TABLET ORAL
Qty: 90 TABLET | Refills: 0 | Status: SHIPPED | OUTPATIENT
Start: 2024-04-11

## 2024-04-11 RX ORDER — METFORMIN HYDROCHLORIDE 500 MG/1
TABLET, EXTENDED RELEASE ORAL
Qty: 90 TABLET | Refills: 0 | Status: SHIPPED | OUTPATIENT
Start: 2024-04-11

## 2024-04-11 RX ORDER — ROSUVASTATIN CALCIUM 5 MG/1
TABLET, COATED ORAL
Qty: 90 TABLET | Refills: 0 | Status: SHIPPED | OUTPATIENT
Start: 2024-04-11

## 2024-04-11 NOTE — TELEPHONE ENCOUNTER
Refill Routing Note   Medication(s) are not appropriate for processing by [facility] for the following reason(s):        Required labs outdated  Drug-disease interaction( K+Cl-) and Bumex)    [facility] action(s):  Defer     Requires labs : Yes      Medication Therapy Plan: KCl and Bumex-DDI's; Drug-Disease: potassium chloride SA and Ulcerative (chronic) enterocolitis; Ulcerative colitis; Drug-Disease: bumex and hypokalemia; all other meds in this encounter labs outdated    Pharmacist review requested: Yes     Appointments  past 12m or future 3m with PCP    Date Provider   Last Visit   6/2/2023 Otto Bruce MD   Next Visit   4/22/2024 Otto Bruce MD   ED visits in past 90 days: 0        Note composed:11:54 AM 04/11/2024

## 2024-04-11 NOTE — TELEPHONE ENCOUNTER
Refill Routing Note   Medication(s) are not appropriate for processing by [facility] for the following reason(s):        Required labs outdated    [facility] action(s):  Defer  Approve   Requires labs : Yes           Pharmacist review requested: Yes     Appointments  past 12m or future 3m with PCP    Date Provider   Last Visit   6/2/2023 Otto Bruce MD   Next Visit   4/22/2024 Otto Bruce MD   ED visits in past 90 days: 0        Note composed:12:15 PM 04/11/2024

## 2024-04-22 ENCOUNTER — OFFICE VISIT (OUTPATIENT)
Dept: PRIMARY CARE CLINIC | Facility: CLINIC | Age: 59
End: 2024-04-22
Payer: MEDICARE

## 2024-04-22 VITALS
BODY MASS INDEX: 36.16 KG/M2 | HEIGHT: 70 IN | WEIGHT: 252.56 LBS | DIASTOLIC BLOOD PRESSURE: 82 MMHG | SYSTOLIC BLOOD PRESSURE: 124 MMHG | OXYGEN SATURATION: 96 % | RESPIRATION RATE: 16 BRPM | HEART RATE: 64 BPM | TEMPERATURE: 98 F

## 2024-04-22 DIAGNOSIS — M51.36 DDD (DEGENERATIVE DISC DISEASE), LUMBAR: ICD-10-CM

## 2024-04-22 DIAGNOSIS — Z12.5 PROSTATE CANCER SCREENING: ICD-10-CM

## 2024-04-22 DIAGNOSIS — M87.051 AVASCULAR NECROSIS OF BONE OF HIP, RIGHT: ICD-10-CM

## 2024-04-22 DIAGNOSIS — E66.01 SEVERE OBESITY (BMI 35.0-39.9) WITH COMORBIDITY: ICD-10-CM

## 2024-04-22 DIAGNOSIS — J96.12 CHRONIC RESPIRATORY FAILURE WITH HYPERCAPNIA: ICD-10-CM

## 2024-04-22 DIAGNOSIS — R73.03 BORDERLINE DIABETES: ICD-10-CM

## 2024-04-22 DIAGNOSIS — H66.002 ACUTE SUPPURATIVE OTITIS MEDIA OF LEFT EAR WITHOUT SPONTANEOUS RUPTURE OF TYMPANIC MEMBRANE, RECURRENCE NOT SPECIFIED: Primary | ICD-10-CM

## 2024-04-22 DIAGNOSIS — J44.9 CHRONIC OBSTRUCTIVE PULMONARY DISEASE, UNSPECIFIED COPD TYPE: ICD-10-CM

## 2024-04-22 DIAGNOSIS — E78.5 HYPERLIPIDEMIA, UNSPECIFIED HYPERLIPIDEMIA TYPE: ICD-10-CM

## 2024-04-22 PROCEDURE — 1160F RVW MEDS BY RX/DR IN RCRD: CPT | Mod: HCNC,CPTII,S$GLB, | Performed by: FAMILY MEDICINE

## 2024-04-22 PROCEDURE — 3074F SYST BP LT 130 MM HG: CPT | Mod: HCNC,CPTII,S$GLB, | Performed by: FAMILY MEDICINE

## 2024-04-22 PROCEDURE — 99214 OFFICE O/P EST MOD 30 MIN: CPT | Mod: HCNC,S$GLB,, | Performed by: FAMILY MEDICINE

## 2024-04-22 PROCEDURE — 99999 PR PBB SHADOW E&M-EST. PATIENT-LVL V: CPT | Mod: PBBFAC,HCNC,, | Performed by: FAMILY MEDICINE

## 2024-04-22 PROCEDURE — 3079F DIAST BP 80-89 MM HG: CPT | Mod: HCNC,CPTII,S$GLB, | Performed by: FAMILY MEDICINE

## 2024-04-22 PROCEDURE — 1159F MED LIST DOCD IN RCRD: CPT | Mod: HCNC,CPTII,S$GLB, | Performed by: FAMILY MEDICINE

## 2024-04-22 PROCEDURE — 3008F BODY MASS INDEX DOCD: CPT | Mod: HCNC,CPTII,S$GLB, | Performed by: FAMILY MEDICINE

## 2024-04-22 RX ORDER — HYDROCODONE BITARTRATE AND ACETAMINOPHEN 10; 325 MG/1; MG/1
1 TABLET ORAL EVERY 8 HOURS PRN
Qty: 90 TABLET | Refills: 0 | Status: SHIPPED | OUTPATIENT
Start: 2024-04-22

## 2024-04-22 RX ORDER — AMOXICILLIN AND CLAVULANATE POTASSIUM 875; 125 MG/1; MG/1
1 TABLET, FILM COATED ORAL 2 TIMES DAILY
Qty: 20 TABLET | Refills: 0 | Status: SHIPPED | OUTPATIENT
Start: 2024-04-22 | End: 2024-05-02

## 2024-04-22 NOTE — PROGRESS NOTES
"Subjective:       Patient ID: Mina Zelaya is a 58 y.o. male.    Chief Complaint: Ear Fullness (Left )    Also has chronic back and hip pain, requesting refill of hydrocodone.  Takes occasionally and does not combined with other sedating meds.  Last filled prescription in fall of 2023.    Ear Fullness   There is pain in the left ear. This is a chronic problem. The current episode started more than 1 month ago. The problem occurs constantly. The problem has been unchanged. There has been no fever. Associated symptoms include coughing, headaches and rhinorrhea. Pertinent negatives include no ear discharge, hearing loss, sore throat or vomiting. He has tried ear drops for the symptoms. The treatment provided mild relief. There is no history of a chronic ear infection, hearing loss or a tympanostomy tube.     Review of Systems   Constitutional:  Negative for fever.   HENT:  Positive for rhinorrhea. Negative for ear discharge, hearing loss and sore throat.    Respiratory:  Positive for cough.    Gastrointestinal:  Negative for vomiting.   Musculoskeletal:  Positive for arthralgias and back pain.   Neurological:  Positive for headaches.       Objective:      Vitals:    04/22/24 0929   BP: 124/82   BP Location: Right arm   Patient Position: Sitting   BP Method: Medium (Manual)   Pulse: 64   Resp: 16   Temp: 97.6 °F (36.4 °C)   TempSrc: Temporal   SpO2: 96%   Weight: 114.5 kg (252 lb 8.6 oz)   Height: 5' 10" (1.778 m)     BP Readings from Last 5 Encounters:   04/22/24 124/82   11/14/23 132/60   09/29/23 112/67   09/07/23 117/62   06/29/23 (!) 106/57     Wt Readings from Last 5 Encounters:   04/22/24 114.5 kg (252 lb 8.6 oz)   11/14/23 115 kg (253 lb 8.5 oz)   09/29/23 115.1 kg (253 lb 12 oz)   09/07/23 118.1 kg (260 lb 5.8 oz)   08/17/23 116.7 kg (257 lb 4.4 oz)     Physical Exam  Vitals and nursing note reviewed.   Constitutional:       General: He is not in acute distress.     Appearance: Normal appearance. He is " well-developed.   HENT:      Head: Normocephalic and atraumatic.      Right Ear: Tympanic membrane normal.      Left Ear: A middle ear effusion is present. Tympanic membrane is erythematous and bulging.   Cardiovascular:      Rate and Rhythm: Normal rate and regular rhythm.      Heart sounds: Normal heart sounds.   Pulmonary:      Effort: Pulmonary effort is normal.      Breath sounds: Normal breath sounds.   Musculoskeletal:      Right lower leg: No edema.      Left lower leg: No edema.   Skin:     General: Skin is warm and dry.   Neurological:      Mental Status: He is alert and oriented to person, place, and time.   Psychiatric:         Mood and Affect: Mood normal.         Behavior: Behavior normal.         Lab Results   Component Value Date    WBC 5.91 03/27/2024    HGB 13.0 (L) 03/27/2024    HCT 40.7 03/27/2024     03/27/2024    CHOL 201 (H) 11/28/2022    TRIG 83 11/28/2022    HDL 53 11/28/2022    ALT 28 03/27/2024    AST 49 (H) 03/27/2024     03/27/2024    K 3.8 03/27/2024     03/27/2024    CREATININE 0.8 03/27/2024    BUN 15 03/27/2024    CO2 27 03/27/2024    TSH 1.452 11/28/2022    PSA 0.77 11/28/2022    INR 1.0 07/14/2020    HGBA1C 5.8 (H) 06/02/2023      Assessment:       1. Acute suppurative otitis media of left ear without spontaneous rupture of tympanic membrane, recurrence not specified    2. Chronic obstructive pulmonary disease, unspecified COPD type    3. Avascular necrosis of bone of hip, right    4. Chronic respiratory failure with hypercapnia    5. Severe obesity (BMI 35.0-39.9) with comorbidity    6. Borderline diabetes    7. Prostate cancer screening    8. DDD (degenerative disc disease), lumbar    9. Hyperlipidemia, unspecified hyperlipidemia type        Plan:       Acute suppurative otitis media of left ear without spontaneous rupture of tympanic membrane, recurrence not specified  -     amoxicillin-clavulanate 875-125mg (AUGMENTIN) 875-125 mg per tablet; Take 1 tablet by  mouth 2 (two) times daily. for 10 days  Dispense: 20 tablet; Refill: 0  To ENT if no improvement  Chronic obstructive pulmonary disease, unspecified COPD type  Stable  Avascular necrosis of bone of hip, right  Chronic, relatively stable  Chronic respiratory failure with hypercapnia    Severe obesity (BMI 35.0-39.9) with comorbidity  Lifestyle modification  Borderline diabetes  -     Hemoglobin A1C; Future; Expected date: 04/22/2024    Prostate cancer screening  -     PSA, Screening; Future; Expected date: 04/22/2024    DDD (degenerative disc disease), lumbar  -     HYDROcodone-acetaminophen (NORCO)  mg per tablet; Take 1 tablet by mouth every 8 (eight) hours as needed for Pain.  Dispense: 90 tablet; Refill: 0   reviewed, no worrisome findings.  Advised to use meds sparingly  Hyperlipidemia, unspecified hyperlipidemia type  -     Comprehensive Metabolic Panel; Future; Expected date: 04/22/2024  -     Lipid Panel; Future; Expected date: 04/22/2024      Medication List with Changes/Refills   New Medications    AMOXICILLIN-CLAVULANATE 875-125MG (AUGMENTIN) 875-125 MG PER TABLET    Take 1 tablet by mouth 2 (two) times daily. for 10 days   Current Medications    ALBUTEROL (PROVENTIL/VENTOLIN HFA) 90 MCG/ACTUATION INHALER    2 puffs every 4 hours as needed for cough, wheeze, or shortness of breath    ALBUTEROL-IPRATROPIUM (DUO-NEB) 2.5 MG-0.5 MG/3 ML NEBULIZER SOLUTION    Take 3 mLs by nebulization every 6 (six) hours as needed for Wheezing or Shortness of Breath. Rescue    ALLOPURINOL (ZYLOPRIM) 100 MG TABLET    TAKE 1 TABLET EVERY DAY    ALLOPURINOL (ZYLOPRIM) 300 MG TABLET    TAKE 1 TABLET EVERY DAY    ASCORBIC ACID, VITAMIN C, (VITAMIN C) 1000 MG TABLET    Take 1,000 mg by mouth once daily.    ASPIRIN (ECOTRIN) 81 MG EC TABLET    Take 81 mg by mouth once daily.    BUMETANIDE (BUMEX) 2 MG TABLET    TAKE 1 TABLET EVERY DAY    CALCIUM CARBONATE (OS-VÍCTOR) 600 MG (1,500 MG) TAB    Take 600 mg by mouth once daily.     CHLORHEXIDINE (HIBICLENS) 4 % EXTERNAL LIQUID    WASH DAILY FOR 3 DAYS THEN 3 DAYS PER WEEK FOR 2 WEEKS- REPEAT AS NEEDED    CLINDAMYCIN (CLEOCIN T) 1 % EXTERNAL SOLUTION    Apply topically 2 (two) times daily.    CYANOCOBALAMIN (VITAMIN B-12) 100 MCG TABLET    Take 100 mcg by mouth once daily.    FISH OIL-OMEGA-3 FATTY ACIDS 300-1,000 MG CAPSULE    Take 1,200 mg by mouth once daily.     MG TABLET    TAKE 1 TABLET THREE TIMES DAILY    KETOCONAZOLE (NIZORAL) 2 % SHAMPOO    Apply topically 3 (three) times a week.    LORATADINE (CLARITIN) 10 MG TABLET    Take 10 mg by mouth once daily.    METFORMIN (GLUCOPHAGE-XR) 500 MG ER 24HR TABLET    TAKE 1 TABLET EVERY MORNING WITH BREAKFAST    MONTELUKAST (SINGULAIR) 10 MG TABLET    TAKE 1 TABLET EVERY EVENING    NEOMYCIN-POLYMYXIN-HYDROCORTISONE (CORTISPORIN) 3.5-10,000-1 MG/ML-UNIT/ML-% OTIC SUSPENSION    Place 3 drops into the left ear 3 (three) times daily.    POTASSIUM CHLORIDE SA (K-DUR,KLOR-CON) 20 MEQ TABLET    TAKE 2 TABLETS TWICE A DAY    ROSUVASTATIN (CRESTOR) 5 MG TABLET    TAKE 1 TABLET EVERY DAY    TIOTROPIUM-OLODATEROL (STIOLTO RESPIMAT) 2.5-2.5 MCG/ACTUATION MIST    INHALE 2 PUFFS INTO THE LUNGS EVERY DAY    TOFACITINIB (XELJANZ XR) 11 MG TB24    Take 11 mg by mouth once daily.    TRAZODONE (DESYREL) 100 MG TABLET    TAKE 1 TABLET AT BEDTIME   Changed and/or Refilled Medications    Modified Medication Previous Medication    HYDROCODONE-ACETAMINOPHEN (NORCO)  MG PER TABLET HYDROcodone-acetaminophen (NORCO)  mg per tablet       Take 1 tablet by mouth every 8 (eight) hours as needed for Pain.    Take 1 tablet by mouth every 8 (eight) hours as needed for Pain.   Discontinued Medications    DIAZEPAM (VALIUM) 10 MG TAB        DOXYCYCLINE (VIBRAMYCIN) 100 MG CAP    Take 1 capsule (100 mg total) by mouth once daily.    FLUCONAZOLE (DIFLUCAN) 200 MG TAB    Take 200mg once weekly x 2 months    ITRACONAZOLE (SPORANOX) 100 MG CAP    Take 200mg twice weekly  x 4 weeks    ITRACONAZOLE (SPORANOX) 100 MG CAP    Take 200mg twice weekly x 2 weeks during flare    KETOCONAZOLE (NIZORAL) 2 % CREAM    Apply topically 2 (two) times daily.    MUPIROCIN (BACTROBAN) 2 % OINTMENT    Use in nose BID x 5 days of each month    PREDNISONE (DELTASONE) 20 MG TABLET    Take 1 tablet (20 mg total) by mouth 2 (two) times daily.    SHINGRIX, PF, 50 MCG/0.5 ML INJECTION

## 2024-05-07 PROBLEM — M25.69 DECREASED RANGE OF MOTION OF TRUNK AND BACK: Status: RESOLVED | Noted: 2023-10-05 | Resolved: 2024-05-07

## 2024-05-07 PROBLEM — M62.81 WEAKNESS OF TRUNK MUSCULATURE: Status: RESOLVED | Noted: 2023-10-05 | Resolved: 2024-05-07

## 2024-05-17 DIAGNOSIS — J44.9 CHRONIC OBSTRUCTIVE PULMONARY DISEASE, UNSPECIFIED COPD TYPE: ICD-10-CM

## 2024-05-17 DIAGNOSIS — J44.9 COPD MIXED TYPE: ICD-10-CM

## 2024-05-17 NOTE — TELEPHONE ENCOUNTER
No care due was identified.  [facility] Embedded Care Due Messages. Reference number: [id number].   5/17/2024 10:01:11 AM CDT

## 2024-05-18 NOTE — TELEPHONE ENCOUNTER
Refill Routing Note   Medication(s) are not appropriate for processing by [facility] for the following reason(s):        Drug-disease interaction    [facility] action(s):  Defer        Medication Therapy Plan: STIOLTO RESPIMAT and Hypokalemia      Appointments  past 12m or future 3m with PCP    Date Provider   Last Visit   4/22/2024 Otto Bruce MD   Next Visit   Visit date not found Otto Bruce MD   ED visits in past 90 days: 0        Note composed:10:53 AM 05/18/2024

## 2024-05-20 ENCOUNTER — TELEPHONE (OUTPATIENT)
Dept: PRIMARY CARE CLINIC | Facility: CLINIC | Age: 59
End: 2024-05-20
Payer: MEDICARE

## 2024-05-20 RX ORDER — TIOTROPIUM BROMIDE AND OLODATEROL 3.124; 2.736 UG/1; UG/1
SPRAY, METERED RESPIRATORY (INHALATION)
Qty: 12 G | Refills: 5 | Status: SHIPPED | OUTPATIENT
Start: 2024-05-20

## 2024-05-20 NOTE — TELEPHONE ENCOUNTER
----- Message from Cheryl Soares sent at 5/20/2024 12:44 PM CDT -----  Contact: [phone] Patient  Pt is calling in regards to the status of his tiotropium-olodateroL (STIOLTO RESPIMAT) 2.5-2.5 mcg/actuation Mist being filled. Pt is also asking if DR Bruce has any samples of the medication because it will take 7-10 days before he gets the medication. Please call and advise. Thank you

## 2024-06-03 ENCOUNTER — PATIENT MESSAGE (OUTPATIENT)
Dept: DERMATOLOGY | Facility: CLINIC | Age: 59
End: 2024-06-03
Payer: MEDICARE

## 2024-06-17 ENCOUNTER — TELEPHONE (OUTPATIENT)
Dept: PULMONOLOGY | Facility: CLINIC | Age: 59
End: 2024-06-17
Payer: MEDICARE

## 2024-06-17 NOTE — TELEPHONE ENCOUNTER
Faxed note to [facility] regarding havent seen pt since 5/2023, seen another MD in 9/2023, no follow up is scheduled with Dr. Leiva.  Also scanned orders from [facility] in system

## 2024-07-16 DIAGNOSIS — M51.36 DDD (DEGENERATIVE DISC DISEASE), LUMBAR: ICD-10-CM

## 2024-07-16 RX ORDER — BUMETANIDE 2 MG/1
2 TABLET ORAL DAILY
Qty: 90 TABLET | Refills: 3 | Status: SHIPPED | OUTPATIENT
Start: 2024-07-16

## 2024-07-16 RX ORDER — TRAZODONE HYDROCHLORIDE 100 MG/1
100 TABLET ORAL NIGHTLY
Qty: 90 TABLET | Refills: 3 | Status: SHIPPED | OUTPATIENT
Start: 2024-07-16

## 2024-07-16 NOTE — TELEPHONE ENCOUNTER
Clindamycin 300mg po TID x 7 days.    Refill Routing Note   Medication(s) are not appropriate for processing by [facility] for the following reason(s):        Outside of protocol: Ibuprofen  Required labs outdated: Uric acid  Drug-disease interaction: bumet, lexapro and hypokalemia    [facility] action(s):  Defer  Route     Requires labs : Yes    Medication Therapy Plan:         Appointments  past 12m or future 3m with PCP    Date Provider   Last Visit   4/22/2024 Otto Bruce MD   Next Visit   Visit date not found Otto Bruce MD   ED visits in past 90 days: 0        Note composed:5:34 PM 07/16/2024

## 2024-07-16 NOTE — TELEPHONE ENCOUNTER
Refill Routing Note   Medication(s) are not appropriate for processing by [facility] for the following reason(s):        Outside of protocol: Advil  Required labs outdated: Zyloprim 100 & 300 mg    [facility] action(s):  Defer  Route  Approve             Pharmacist review requested: Yes   Extended chart review required: Yes     Appointments  past 12m or future 3m with PCP    Date Provider   Last Visit   4/22/2024 Otto Bruce MD   Next Visit   Visit date not found Otto Bruce MD   ED visits in past 90 days: 0        Note composed:5:45 PM 07/16/2024

## 2024-07-16 NOTE — TELEPHONE ENCOUNTER
No care due was identified.  [facility] Embedded Care Due Messages. Reference number: [id number].   7/16/2024 4:53:23 PM CDT

## 2024-07-17 RX ORDER — IBUPROFEN 800 MG/1
TABLET ORAL
Qty: 270 TABLET | Refills: 3 | Status: SHIPPED | OUTPATIENT
Start: 2024-07-17

## 2024-07-17 RX ORDER — ALLOPURINOL 100 MG/1
100 TABLET ORAL DAILY
Qty: 90 TABLET | Refills: 0 | Status: SHIPPED | OUTPATIENT
Start: 2024-07-17

## 2024-07-17 RX ORDER — ALLOPURINOL 300 MG/1
300 TABLET ORAL DAILY
Qty: 90 TABLET | Refills: 0 | Status: SHIPPED | OUTPATIENT
Start: 2024-07-17

## 2024-08-08 ENCOUNTER — OFFICE VISIT (OUTPATIENT)
Dept: PULMONOLOGY | Facility: CLINIC | Age: 59
End: 2024-08-08
Payer: MEDICARE

## 2024-08-08 VITALS
DIASTOLIC BLOOD PRESSURE: 65 MMHG | WEIGHT: 248.44 LBS | HEART RATE: 58 BPM | HEIGHT: 70 IN | SYSTOLIC BLOOD PRESSURE: 112 MMHG | BODY MASS INDEX: 35.57 KG/M2 | OXYGEN SATURATION: 95 %

## 2024-08-08 DIAGNOSIS — J44.9 CHRONIC OBSTRUCTIVE PULMONARY DISEASE, UNSPECIFIED COPD TYPE: ICD-10-CM

## 2024-08-08 DIAGNOSIS — M87.051 AVASCULAR NECROSIS OF BONE OF HIP, RIGHT: ICD-10-CM

## 2024-08-08 DIAGNOSIS — J98.6 DIAPHRAGM PARALYSIS: ICD-10-CM

## 2024-08-08 DIAGNOSIS — M25.551 BILATERAL HIP PAIN: Primary | ICD-10-CM

## 2024-08-08 DIAGNOSIS — M25.552 BILATERAL HIP PAIN: Primary | ICD-10-CM

## 2024-08-08 PROCEDURE — 99214 OFFICE O/P EST MOD 30 MIN: CPT | Mod: HCNC,S$GLB,, | Performed by: INTERNAL MEDICINE

## 2024-08-08 PROCEDURE — 3044F HG A1C LEVEL LT 7.0%: CPT | Mod: HCNC,CPTII,S$GLB, | Performed by: INTERNAL MEDICINE

## 2024-08-08 PROCEDURE — 3074F SYST BP LT 130 MM HG: CPT | Mod: HCNC,CPTII,S$GLB, | Performed by: INTERNAL MEDICINE

## 2024-08-08 PROCEDURE — 99999 PR PBB SHADOW E&M-EST. PATIENT-LVL III: CPT | Mod: PBBFAC,HCNC,, | Performed by: INTERNAL MEDICINE

## 2024-08-08 PROCEDURE — 3078F DIAST BP <80 MM HG: CPT | Mod: HCNC,CPTII,S$GLB, | Performed by: INTERNAL MEDICINE

## 2024-08-08 PROCEDURE — 3008F BODY MASS INDEX DOCD: CPT | Mod: HCNC,CPTII,S$GLB, | Performed by: INTERNAL MEDICINE

## 2024-08-29 ENCOUNTER — TELEPHONE (OUTPATIENT)
Dept: PULMONOLOGY | Facility: CLINIC | Age: 59
End: 2024-08-29
Payer: MEDICARE

## 2024-08-29 NOTE — TELEPHONE ENCOUNTER
Faxed rx for invasive or non invasive bentilation on respironice tilogy ventilator to [facility] on 08/15/24 and again on 08/29/24.  [phone]

## 2024-09-19 ENCOUNTER — PATIENT MESSAGE (OUTPATIENT)
Dept: PRIMARY CARE CLINIC | Facility: CLINIC | Age: 59
End: 2024-09-19
Payer: MEDICARE

## 2024-10-02 DIAGNOSIS — J45.40 MODERATE PERSISTENT ASTHMA, UNCOMPLICATED: ICD-10-CM

## 2024-10-02 DIAGNOSIS — J44.9 COPD MIXED TYPE: ICD-10-CM

## 2024-10-02 DIAGNOSIS — R09.89 CHRONIC SINUS COMPLAINTS: ICD-10-CM

## 2024-10-02 NOTE — TELEPHONE ENCOUNTER
Care Due:                  Date            Visit Type   Department     Provider  --------------------------------------------------------------------------------                                EP -                              [facility]      [facility] JOHNSOMAR  Last Visit: 04-      CARE ([facility])   PRIMARY CARE   Otto Bruce                              [facility]                              PRIMARY      [facility]  Next Visit: 10-      CARE ([facility])   PRIMARY CARE   Otto Bruce                                                            Last  Test          Frequency    Reason                     Performed    Due Date  --------------------------------------------------------------------------------    HBA1C.......  6 months...  metFORMIN................  04-   10-    [facility] Embedded Care Due Messages. Reference number: [id number].   10/02/2024 12:26:22 PM CDT

## 2024-10-03 RX ORDER — MONTELUKAST SODIUM 10 MG/1
TABLET ORAL
Qty: 90 TABLET | Refills: 1 | Status: SHIPPED | OUTPATIENT
Start: 2024-10-03

## 2024-10-03 NOTE — TELEPHONE ENCOUNTER
Provider Staff:  Action required for this patient    Requires labs      Please see care gap opportunities below in Care Due Message.    Thanks!  [facility]     Appointments      Date Provider   Last Visit   4/22/2024 Otto Bruce MD   Next Visit   10/4/2024 Otto Bruce MD     Refill Decision Note   Mina Zelaya  is requesting a refill authorization.  Brief Assessment and Rationale for Refill:  Approve     Medication Therapy Plan:         Comments:     Note composed:10:55 AM 10/03/2024

## 2024-10-04 ENCOUNTER — OFFICE VISIT (OUTPATIENT)
Dept: PRIMARY CARE CLINIC | Facility: CLINIC | Age: 59
End: 2024-10-04
Payer: MEDICARE

## 2024-10-04 VITALS
RESPIRATION RATE: 18 BRPM | BODY MASS INDEX: 34.53 KG/M2 | WEIGHT: 241.19 LBS | HEART RATE: 78 BPM | OXYGEN SATURATION: 97 % | TEMPERATURE: 98 F | HEIGHT: 70 IN | SYSTOLIC BLOOD PRESSURE: 138 MMHG | DIASTOLIC BLOOD PRESSURE: 76 MMHG

## 2024-10-04 DIAGNOSIS — M25.552 CHRONIC HIP PAIN, BILATERAL: Primary | ICD-10-CM

## 2024-10-04 DIAGNOSIS — M51.369 DDD (DEGENERATIVE DISC DISEASE), LUMBAR: ICD-10-CM

## 2024-10-04 DIAGNOSIS — M25.551 CHRONIC HIP PAIN, BILATERAL: Primary | ICD-10-CM

## 2024-10-04 DIAGNOSIS — G89.29 CHRONIC HIP PAIN, BILATERAL: Primary | ICD-10-CM

## 2024-10-04 DIAGNOSIS — Z23 NEED FOR VACCINATION: ICD-10-CM

## 2024-10-04 DIAGNOSIS — M77.01 MEDIAL EPICONDYLITIS OF RIGHT ELBOW: ICD-10-CM

## 2024-10-04 DIAGNOSIS — N52.9 ERECTILE DYSFUNCTION, UNSPECIFIED ERECTILE DYSFUNCTION TYPE: ICD-10-CM

## 2024-10-04 PROCEDURE — 99999 PR PBB SHADOW E&M-EST. PATIENT-LVL V: CPT | Mod: PBBFAC,HCNC,, | Performed by: FAMILY MEDICINE

## 2024-10-04 RX ORDER — HYDROCODONE BITARTRATE AND ACETAMINOPHEN 10; 325 MG/1; MG/1
1 TABLET ORAL EVERY 8 HOURS PRN
Qty: 90 TABLET | Refills: 0 | Status: SHIPPED | OUTPATIENT
Start: 2024-10-04

## 2024-10-04 RX ORDER — TADALAFIL 10 MG/1
10 TABLET ORAL DAILY PRN
Qty: 10 TABLET | Refills: 11 | Status: SHIPPED | OUTPATIENT
Start: 2024-10-04

## 2024-10-04 NOTE — PROGRESS NOTES
Patient ID: Mina Zelaya is a 59 y.o. male.    Chief Complaint: Annual Exam    History of Present Illness    CHIEF COMPLAINT:  Patient presents today for hip and elbow pain.    MUSCULOSKELETAL:  He reports hip pain due to osteonecrosis, experiencing difficulty lying flat on his back which complicates imaging studies. Pain worsens with prolonged sitting and while driving. He finds sitting on a tall stool more comfortable than lower seating options. Right elbow pain is more severe than left, with pain behind the bone and tenderness over the right medial epicondyle. Pain is exacerbated with resisted wrist flexion and worsens with leaning or turning. He notes some relief with bracing and denies feeling pain deep in the joint space itself.    DIAPHRAGMATIC ISSUE:  He reports a diaphragmatic issue affecting his ability to lie flat. His bowels, stomach, and lungs are affected when lying down, causing a paralysis-like sensation. He states his abdomen moves into his lungs when lying flat. This complicates MRI procedures, and he expresses a preference for standing or side-lying positions during imaging tests. He was able to complete a CT previously by lying on his side, though it takes time for him to get into position due to these symptoms.    MEDICAL HISTORY:  He has a history of prediabetes with mildly elevated fasting glucose levels. His glycosylated hemoglobin (A1C) has remained stable for over six years, indicating a consistent prediabetic state.    MEDICATIONS:  He takes cortisone for pain management, using approximately 90 pills every 4-5 months. He reports taking half a pill before cutting grass to manage symptoms. For erectile dysfunction, he is currently using medication that causes eye sensitivity and shortness of breath as side effects. He is interested in trying an alternative medication to address these side effects.    SOCIAL HISTORY:  He reports recently getting , approximately one year ago. For  exercise, he cuts grass, maintaining multiple yards. This activity serves as a means to increase his daily step count and maintain physical activity.    SURGICAL HISTORY:  He denies any surgical history.    SUBSTANCE USE:  He denies any substance use.    FAMILY HISTORY:  He denies any significant family medical history.    ALLERGIES:  He denies any known allergies.      ROS:  General: -fever, -chills, -fatigue, -weight gain, -weight loss  Eyes: -vision changes, -redness, -discharge  ENT: -ear pain, -nasal congestion, -sore throat  Cardiovascular: -chest pain, -palpitations, -lower extremity edema  Respiratory: -cough, +shortness of breath  Gastrointestinal: -abdominal pain, -nausea, -vomiting, -diarrhea, -constipation, -blood in stool  Genitourinary: -dysuria, -hematuria, -frequency  Musculoskeletal: -joint pain, -muscle pain, -joint stiffness  Skin: -rash, -lesion  Neurological: -headache, -dizziness, -numbness, -tingling  Psychiatric: -anxiety, -depression, -sleep difficulty  Allergic: -allergic reactions         Physical Exam    General: Well-developed. Well-nourished. No acute distress.  Eyes: EOMI. Sclerae anicteric.  HENT: Normocephalic. Atraumatic. Nares patent. Moist oral mucosa.  Cardiovascular: Regular rate. Regular rhythm. No murmurs. No rubs. No gallops. Normal S1, S2.  Respiratory: Normal respiratory effort. Clear to auscultation bilaterally. No rales. No rhonchi. No wheezing.  Musculoskeletal: No  obvious deformity.  Extremities: No lower extremity edema.  Neurological: Alert & oriented x3. No slurred speech. Normal gait.  Psychiatric: Normal mood. Normal affect. Good insight. Good judgment.  Skin: Warm. Dry. No rash.  MSK: Elbow - Right: Tenderness over the right medial epicondyle of the elbow. Pain with resisted wrist flexion.         Assessment & Plan    - Assessed hip pain, likely related to osteonecrosis; definitive treatment typically involves hip replacement  - Evaluated right elbow pain,  consistent with epicondylitis/tendinitis  - Considered patient's difficulty with lying flat for imaging studies due to reported diaphragmatic issues  - Reviewed prediabetic status; noted A1C and fasting glucose have remained stable for years  - Discussed erectile dysfunction medication options; considered switching from Viagra to Cialis due to side effects    EPICONDYLITIS:   Explained epicondylitis as a type of tendinitis, typically an overuse injury.   Patient to apply ice to affected elbow for pain relief.   Patient to continue using elbow brace as it provides some relief.   Refilled current pain medication, providing 90 pills to last 4-5 months.    ERECTILE DYSFUNCTION:   Discussed mechanism of action for erectile dysfunction medications, noting similarities between Viagra and Cialis.   Started Cialis 10 mg as needed for erectile dysfunction.   Contact the office if Cialis is cost-prohibitive at [facility] to explore alternative pharmacy options.    FLU VACCINATION:   Flu shot administered in office.    HIP PAIN:   Referred to new orthopedist for evaluation of hip pain and imaging options.              Follow up if symptoms worsen or fail to improve.    This note was generated with the assistance of ambient listening technology. Verbal consent was obtained by the patient and accompanying visitor(s) for the recording of patient appointment to facilitate this note. I attest to having reviewed and edited the generated note for accuracy, though some syntax or spelling errors may persist. Please contact the author of this note for any clarification.

## 2024-10-04 NOTE — PROGRESS NOTES
Verified pt ID using name and . Lauren Reyes, LPN. Administered Influenza in left deltoid per physician order using aseptic technique. Aspirated and no blood return noted. Pt tolerated well with no adverse reactions noted.

## 2024-10-09 RX ORDER — ALLOPURINOL 100 MG/1
100 TABLET ORAL
Qty: 90 TABLET | Refills: 3 | Status: SHIPPED | OUTPATIENT
Start: 2024-10-09

## 2024-10-09 RX ORDER — ALLOPURINOL 300 MG/1
300 TABLET ORAL
Qty: 90 TABLET | Refills: 3 | Status: SHIPPED | OUTPATIENT
Start: 2024-10-09

## 2024-10-18 DIAGNOSIS — E87.6 HYPOKALEMIA: ICD-10-CM

## 2024-10-18 NOTE — TELEPHONE ENCOUNTER
----- Message from Beckie sent at 10/18/2024 11:47 AM CDT -----  Contact: [phone]  Requesting an RX refill or new RX.    Is this a refill or new RX: Refill     RX name and strength (copy/paste from chart):  potassium chloride SA (K-DUR,KLOR-CON) 20 MEQ tablet    Is this a 30 day or 90 day RX: 90    Pharmacy name and phone # (copy/paste from chart):    [facility] - [place], [place] - [address]  [address] [address]  Phone: [phone] Fax: [phone]        The doctors have asked that we provide their patients with the following 2 reminders -- prescription refills can take up to 72 hours, and a friendly reminder that in the future you can use your [facility] account to request refills: call back

## 2024-10-18 NOTE — TELEPHONE ENCOUNTER
No care due was identified.  [facility] Embedded Care Due Messages. Reference number: [id number].   10/18/2024 10:34:47 AM CDT

## 2024-10-19 NOTE — TELEPHONE ENCOUNTER
Refill Routing Note   Medication(s) are not appropriate for processing by [facility] for the following reason(s):        Drug-disease interaction: Drug-Disease: potassium chloride SA and Ulcerative colitis     [facility] action(s):  Defer               Appointments  past 12m or future 3m with PCP    Date Provider   Last Visit   10/4/2024 Otto Bruce MD   Next Visit   Visit date not found Otto Bruce MD   ED visits in past 90 days: 0        Note composed:3:19 PM 10/19/2024

## 2024-10-20 RX ORDER — POTASSIUM CHLORIDE 20 MEQ/1
40 TABLET, EXTENDED RELEASE ORAL 2 TIMES DAILY
Qty: 360 TABLET | Refills: 1 | Status: SHIPPED | OUTPATIENT
Start: 2024-10-20

## 2024-11-04 DIAGNOSIS — M25.551 BILATERAL HIP PAIN: Primary | ICD-10-CM

## 2024-11-04 DIAGNOSIS — M25.552 BILATERAL HIP PAIN: Primary | ICD-10-CM

## 2024-11-07 ENCOUNTER — TELEPHONE (OUTPATIENT)
Dept: PULMONOLOGY | Facility: CLINIC | Age: 59
End: 2024-11-07
Payer: MEDICARE

## 2024-11-07 ENCOUNTER — TELEPHONE (OUTPATIENT)
Dept: PRIMARY CARE CLINIC | Facility: CLINIC | Age: 59
End: 2024-11-07
Payer: MEDICARE

## 2024-11-07 ENCOUNTER — OFFICE VISIT (OUTPATIENT)
Dept: ORTHOPEDICS | Facility: CLINIC | Age: 59
End: 2024-11-07
Payer: MEDICARE

## 2024-11-07 ENCOUNTER — TELEPHONE (OUTPATIENT)
Dept: ORTHOPEDICS | Facility: CLINIC | Age: 59
End: 2024-11-07

## 2024-11-07 VITALS
SYSTOLIC BLOOD PRESSURE: 117 MMHG | WEIGHT: 241.63 LBS | HEART RATE: 58 BPM | HEIGHT: 70 IN | DIASTOLIC BLOOD PRESSURE: 65 MMHG | BODY MASS INDEX: 34.59 KG/M2

## 2024-11-07 DIAGNOSIS — M25.551 CHRONIC HIP PAIN, BILATERAL: ICD-10-CM

## 2024-11-07 DIAGNOSIS — M25.552 CHRONIC HIP PAIN, BILATERAL: ICD-10-CM

## 2024-11-07 DIAGNOSIS — M87.051 AVASCULAR NECROSIS OF BONE OF HIP, RIGHT: Primary | ICD-10-CM

## 2024-11-07 DIAGNOSIS — G89.29 CHRONIC HIP PAIN, BILATERAL: ICD-10-CM

## 2024-11-07 PROCEDURE — 3008F BODY MASS INDEX DOCD: CPT | Mod: HCNC,CPTII,S$GLB,

## 2024-11-07 PROCEDURE — 3074F SYST BP LT 130 MM HG: CPT | Mod: HCNC,CPTII,S$GLB,

## 2024-11-07 PROCEDURE — 3078F DIAST BP <80 MM HG: CPT | Mod: HCNC,CPTII,S$GLB,

## 2024-11-07 PROCEDURE — 99999 PR PBB SHADOW E&M-EST. PATIENT-LVL V: CPT | Mod: PBBFAC,HCNC,,

## 2024-11-07 PROCEDURE — 99214 OFFICE O/P EST MOD 30 MIN: CPT | Mod: HCNC,S$GLB,,

## 2024-11-07 PROCEDURE — 1159F MED LIST DOCD IN RCRD: CPT | Mod: HCNC,CPTII,S$GLB,

## 2024-11-07 PROCEDURE — 3044F HG A1C LEVEL LT 7.0%: CPT | Mod: HCNC,CPTII,S$GLB,

## 2024-11-07 NOTE — TELEPHONE ENCOUNTER
Spoke with anesthesia as well as surgeon.  We are able to accommodate patient for right total hip arthroplasty.    Spoke with patient via phone, discussed we can proceed with INA.  Stated he will need clearance from primary care and pulmonology as well as attending joint boot camp.  He will work on getting clearance and he was scheduled for November joint boot camp date.  All questions answered.

## 2024-11-07 NOTE — TELEPHONE ENCOUNTER
----- Message from Nurse Calderón sent at 11/7/2024 10:46 AM CST -----  Good morning,    The following patient will be scheduled for Right Total Hip arthroplasy under general anesthesia with Joon Quigley MD possibly December/January.     We are in need of surgical clearance from your office in order to proceed with the procedure.    Please include any special instructions regarding holding Plavix / ASA / or any other medications.     Please let us know if you need anything else from our office to paulo clearance or if you have any questions.     Thank you kindly,    Kaitlynn Maldonado LPN  Orthopedics  Phone- [phone]  Fax- [phone]

## 2024-11-07 NOTE — TELEPHONE ENCOUNTER
----- Message from Tahira sent at 11/7/2024  9:12 AM CST -----  Contact: [phone]  [email or web address]     Patient is calling for Medical Advice regarding:clearance for a hip surgery     How long has patient had these symptoms:    Pharmacy name and phone#:    Patient wants a call back or thru [facility]:call back     Comments:  States he is needing the clearance first please advise and give return call   Please advise patient replies from provider may take up to 48 hours.

## 2024-11-07 NOTE — TELEPHONE ENCOUNTER
----- Message from Brianna sent at 11/7/2024  9:08 AM CST -----  Type: Needs Medical Advice  Who Called:  pt  Symptoms (please be specific):    How long has patient had these symptoms:    Pharmacy name and phone #:    Best Call Back Number: [phone]    Additional Information: Pt is having hip surgery and needs a pre op clearance from Dr. Leiva

## 2024-11-07 NOTE — PROGRESS NOTES
Patient ID: Mina Zelaya is a 59 y.o. male    Pain of the Left Hip and Pain of the Right Hip      History of Present Illness:    Mina Zelaya presents to clinic for bilateral hip pain, R > L. Patient denies known FUAD, but does state he fell of a ladder years ago. The pain started  years ago and is becoming progressively worse.  Pain is located over (points to) groin bilateral hip. He reports that the pain is a 3 /10 sharp and sore pain toda. The pain is affecting ADLs and limiting desired level of activity. Denies numbness, tingling, radiation and inability to bear weight.  Pain is 10 /10 at its worst.     Trial of Norco and ibuprofen with no improvement. 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     Occupation: retired    Ambulating: unassisted  Diabetic: +   Lab Results   Component Value Date    LABA1C 5.8 08/06/2020    HGBA1C 5.8 (H) 04/26/2024       Smoking: no  Hx of DVT/PE: no    ____________________________________________________________________    Interval history 11/7/2024 : Patient returns today for follow up of bilateral hip pain.  Saw me about a year and a half ago for the same issue.  He initially did formal physical therapy with some improvement in regards to range of motion.  However his hip is still very painful.  He has diaphragmatic paralysis and was sent to [facility] for any surgical intervention due to complexity of case.  They sent him to pain management pain management sent him for second opinion of his diaphragmatic paralysis.  He is now back today for continued hip pain.      PAST MEDICAL HISTORY:   Past Medical History:   Diagnosis Date    Arthritis     Asthma     Chronic bronchitis     Emphysema of lung     Heavy alcohol consumption 10/01/2018    Type 2 diabetes mellitus with hyperlipidemia 01/25/2018     PAST SURGICAL HISTORY: No past surgical history on file.  FAMILY HISTORY:   Family History    Problem Relation Name Age of Onset    Stroke Mother      No Known Problems Father      Pneumonia Sister      Stroke Brother      No Known Problems Maternal Aunt      Stroke Paternal Aunt      Cancer Paternal Uncle      Cancer Maternal Grandmother      Heart failure Maternal Grandfather      Stroke Paternal Grandmother      No Known Problems Paternal Grandfather      Stroke Brother      No Known Problems Brother      No Known Problems Brother       SOCIAL HISTORY:   Social History     Occupational History    Not on file   Tobacco Use    Smoking status: Former     Current packs/day: 0.00     Average packs/day: 1 pack/day for 15.0 years (15.0 ttl pk-yrs)     Types: Cigarettes     Start date: 1988     Quit date: 2003     Years since quittin.4    Smokeless tobacco: Never   Substance and Sexual Activity    Alcohol use: Yes     Alcohol/week: 25.0 standard drinks of alcohol     Types: 25 Cans of beer per week    Drug use: No    Sexual activity: Not Currently     Partners: Female        MEDICATIONS:   Current Outpatient Medications:     albuterol (PROVENTIL/VENTOLIN HFA) 90 mcg/actuation inhaler, 2 puffs every 4 hours as needed for cough, wheeze, or shortness of breath, Disp: 54 g, Rfl: 3    allopurinoL (ZYLOPRIM) 100 MG tablet, TAKE 1 TABLET EVERY DAY, Disp: 90 tablet, Rfl: 3    allopurinoL (ZYLOPRIM) 300 MG tablet, TAKE 1 TABLET EVERY DAY, Disp: 90 tablet, Rfl: 3    ascorbic acid, vitamin C, (VITAMIN C) 1000 MG tablet, Take 1,000 mg by mouth once daily., Disp: , Rfl:     aspirin (ECOTRIN) 81 MG EC tablet, Take 81 mg by mouth once daily., Disp: , Rfl:     bumetanide (BUMEX) 2 MG tablet, Take 1 tablet (2 mg total) by mouth once daily., Disp: 90 tablet, Rfl: 3    calcium carbonate (OS-VÍCTOR) 600 mg (1,500 mg) Tab, Take 600 mg by mouth once daily., Disp: , Rfl:     chlorhexidine (HIBICLENS) 4 % external liquid, WASH DAILY FOR 3 DAYS THEN 3 DAYS PER WEEK FOR 2 WEEKS- REPEAT AS NEEDED, Disp: 236 mL, Rfl: 1     clindamycin (CLEOCIN T) 1 % external solution, Apply topically 2 (two) times daily., Disp: 60 mL, Rfl: 1    cyanocobalamin (VITAMIN B-12) 100 MCG tablet, Take 100 mcg by mouth once daily., Disp: , Rfl:     fish oil-omega-3 fatty acids 300-1,000 mg capsule, Take 1,200 mg by mouth once daily., Disp: , Rfl:     HYDROcodone-acetaminophen (NORCO)  mg per tablet, Take 1 tablet by mouth every 8 (eight) hours as needed for Pain., Disp: 90 tablet, Rfl: 0     mg tablet, TAKE 1 TABLET THREE TIMES DAILY, Disp: 270 tablet, Rfl: 3    ketoconazole (NIZORAL) 2 % shampoo, Apply topically 3 (three) times a week., Disp: 120 mL, Rfl: 3    loratadine (CLARITIN) 10 mg tablet, Take 10 mg by mouth once daily., Disp: , Rfl:     metFORMIN (GLUCOPHAGE-XR) 500 MG ER 24hr tablet, TAKE 1 TABLET EVERY MORNING WITH BREAKFAST, Disp: 90 tablet, Rfl: 1    montelukast (SINGULAIR) 10 mg tablet, TAKE 1 TABLET EVERY EVENING, Disp: 90 tablet, Rfl: 1    neomycin-polymyxin-hydrocortisone (CORTISPORIN) 3.5-10,000-1 mg/mL-unit/mL-% otic suspension, Place 3 drops into the left ear 3 (three) times daily., Disp: 10 mL, Rfl: 0    potassium chloride SA (K-DUR,KLOR-CON) 20 MEQ tablet, Take 2 tablets (40 mEq total) by mouth 2 (two) times daily., Disp: 360 tablet, Rfl: 1    rosuvastatin (CRESTOR) 5 MG tablet, TAKE 1 TABLET EVERY DAY, Disp: 90 tablet, Rfl: 3    STIOLTO RESPIMAT 2.5-2.5 mcg/actuation Mist, INHALE 2 PUFFS INTO THE LUNGS EVERY DAY, Disp: 12 g, Rfl: 5    tadalafiL (CIALIS) 10 MG tablet, Take 1 tablet (10 mg total) by mouth daily as needed for Erectile Dysfunction., Disp: 10 tablet, Rfl: 11    tofacitinib (XELJANZ XR) 11 mg Tb24, Take 11 mg by mouth once daily., Disp: , Rfl:     traZODone (DESYREL) 100 MG tablet, Take 1 tablet (100 mg total) by mouth every evening. At bedtime, Disp: 90 tablet, Rfl: 3    albuterol-ipratropium (DUO-NEB) 2.5 mg-0.5 mg/3 mL nebulizer solution, Take 3 mLs by nebulization every 6 (six) hours as needed for Wheezing  or Shortness of Breath. Rescue, Disp: 120 vial, Rfl: 5  ALLERGIES:   Review of patient's allergies indicates:   Allergen Reactions    Atorvastatin      myalgia    Sulfa (sulfonamide antibiotics) Rash         Physical Exam     Vitals:    11/07/24 0810   BP: 117/65   Pulse: (!) 58       Alert and oriented to person, place and time. No acute distress. Well-groomed, not ill appearing. Pupils round and reactive, normal respiratory effort, no audible wheezing.       General:  The patient is alert and oriented x 3.  Mood is pleasant.      bilateral HIP EXAMINATION     OBSERVATION / INSPECTION  Gait:   antalgic   Alignment:  Neutral   Scars:   None   Muscle atrophy: None   Effusion:  None   Warmth:  None   Leg lengths:   Equal     TENDERNESS         Trochanteric bursa   -   Piriformis    -   SI joint    -   Psoas tendon   -   Rectus insertion  -   Adductor insertion  -   Pubic symphysis  -     ROM: (* = pain)    Flexion:    100 degrees *  External rotation: 20 degrees *  Internal rotation with axial load: 30 degrees  Internal rotation without axial load: 40 degrees  Abduction:  30 degrees  Adduction:   10 * degrees    SPECIAL TESTS:  Pain w/ forced internal rotation (FADIR): +   Pain w/ forced external rotation (KAT): +  Circumduction test:    Negative   Stinchfield test:    +   Log roll:      +  Snapping hip (internal):   Negative   Sit-up pain:     Negative   Resisted sit-up pain:    +  Trendelenburg test:    Negative       EXTREMITY NEURO-VASCULAR EXAMINATION:   Sensation:  Grossly intact to light touch all dermatomal regions.   Motor Function:  Fully intact motor function at hip, knee, foot and ankle    DTRs;  quadriceps and  achilles 2+.  No clonus and downgoing Babinski.    Vascular status:  DP and PT pulses 2+, brisk capillary refill, symmetric.    Skin: intact, compartments soft.    Imaging:     Bilateral hip x-rays reviewed by me showing no evidence of acute fracture or dislocation.  There is remodeling of  bilateral femoral heads, right greater than left consistent with avascular necrosis      Assessment & Plan    Avascular necrosis of bone of hip, right  -     Ambulatory referral/consult to Orthopedics    Chronic hip pain, bilateral  -     Ambulatory referral/consult to Orthopedics       Patient here for chronic right hip pain.  His x-rays do show avascular necrosis of his right hip which has progressed since 2023.  Discussed due to his diaphragmatic paralysis, having surgery at [facility] may not necessarily be an option.  Stated I would talk to the anesthesia team here as well as the surgeon.  He does lay on his left side for the right total hip replacement.  He would need primary care and pulmonology clearance as well as to attend joint boot camp.  Pending hospital decision, may proceed with INA workup or will need to be sent to [facility].      Follow up: pending INA  X-rays next visit: none    All questions were answered and patient is agreeable to the above plan.

## 2024-11-07 NOTE — TELEPHONE ENCOUNTER
Returned patient call, spoke about pre op clearance for hip surgery. Patient last seen August do you need another appt for pre op clearance?

## 2024-11-11 ENCOUNTER — TELEPHONE (OUTPATIENT)
Dept: PULMONOLOGY | Facility: CLINIC | Age: 59
End: 2024-11-11
Payer: MEDICARE

## 2024-11-11 ENCOUNTER — TELEPHONE (OUTPATIENT)
Dept: PRIMARY CARE CLINIC | Facility: CLINIC | Age: 59
End: 2024-11-11

## 2024-11-11 NOTE — TELEPHONE ENCOUNTER
Spoke to patient, letter is in chart, routed it to ortho dr.     ----- Message from Gilbert sent at 11/11/2024  8:50 AM CST -----  Regarding: return call  Contact: patient  Type:  Patient Returning Call    Who Called:patient  Who Left Message for Patient:nurse  Does the patient know what this is regarding?:clearance  Would the patient rather a call back or a response via [facility]?   Best Call Back Number:[phone]  Additional Information:

## 2024-11-13 ENCOUNTER — OFFICE VISIT (OUTPATIENT)
Dept: ORTHOPEDICS | Facility: CLINIC | Age: 59
End: 2024-11-13
Payer: MEDICARE

## 2024-11-13 ENCOUNTER — OFFICE VISIT (OUTPATIENT)
Dept: PRIMARY CARE CLINIC | Facility: CLINIC | Age: 59
End: 2024-11-13
Payer: MEDICARE

## 2024-11-13 VITALS
SYSTOLIC BLOOD PRESSURE: 130 MMHG | BODY MASS INDEX: 35.85 KG/M2 | OXYGEN SATURATION: 95 % | DIASTOLIC BLOOD PRESSURE: 62 MMHG | RESPIRATION RATE: 18 BRPM | WEIGHT: 250.44 LBS | HEIGHT: 70 IN | TEMPERATURE: 98 F | HEART RATE: 82 BPM

## 2024-11-13 VITALS
SYSTOLIC BLOOD PRESSURE: 130 MMHG | HEART RATE: 82 BPM | BODY MASS INDEX: 35.9 KG/M2 | WEIGHT: 250.25 LBS | DIASTOLIC BLOOD PRESSURE: 62 MMHG

## 2024-11-13 DIAGNOSIS — J98.6 DIAPHRAGM PARALYSIS: ICD-10-CM

## 2024-11-13 DIAGNOSIS — Z01.818 PREOPERATIVE EXAMINATION: Primary | ICD-10-CM

## 2024-11-13 DIAGNOSIS — J44.9 CHRONIC OBSTRUCTIVE PULMONARY DISEASE, UNSPECIFIED COPD TYPE: ICD-10-CM

## 2024-11-13 DIAGNOSIS — J96.11 CHRONIC RESPIRATORY FAILURE WITH HYPOXIA: ICD-10-CM

## 2024-11-13 DIAGNOSIS — M87.051 AVASCULAR NECROSIS OF BONE OF HIP, RIGHT: Primary | ICD-10-CM

## 2024-11-13 DIAGNOSIS — M25.551 BILATERAL HIP PAIN: ICD-10-CM

## 2024-11-13 DIAGNOSIS — M25.552 BILATERAL HIP PAIN: ICD-10-CM

## 2024-11-13 LAB
OHS QRS DURATION: 112 MS
OHS QTC CALCULATION: 465 MS

## 2024-11-13 PROCEDURE — 3044F HG A1C LEVEL LT 7.0%: CPT | Mod: CPTII,S$GLB,, | Performed by: FAMILY MEDICINE

## 2024-11-13 PROCEDURE — 1159F MED LIST DOCD IN RCRD: CPT | Mod: CPTII,S$GLB,, | Performed by: ORTHOPAEDIC SURGERY

## 2024-11-13 PROCEDURE — 3075F SYST BP GE 130 - 139MM HG: CPT | Mod: CPTII,S$GLB,, | Performed by: FAMILY MEDICINE

## 2024-11-13 PROCEDURE — 3008F BODY MASS INDEX DOCD: CPT | Mod: CPTII,S$GLB,, | Performed by: FAMILY MEDICINE

## 2024-11-13 PROCEDURE — 3075F SYST BP GE 130 - 139MM HG: CPT | Mod: CPTII,S$GLB,, | Performed by: ORTHOPAEDIC SURGERY

## 2024-11-13 PROCEDURE — 99215 OFFICE O/P EST HI 40 MIN: CPT | Mod: S$GLB,,, | Performed by: ORTHOPAEDIC SURGERY

## 2024-11-13 PROCEDURE — 99999 PR PBB SHADOW E&M-EST. PATIENT-LVL V: CPT | Mod: PBBFAC,HCNC,, | Performed by: FAMILY MEDICINE

## 2024-11-13 PROCEDURE — 1159F MED LIST DOCD IN RCRD: CPT | Mod: CPTII,S$GLB,, | Performed by: FAMILY MEDICINE

## 2024-11-13 PROCEDURE — 93010 ELECTROCARDIOGRAM REPORT: CPT | Mod: S$GLB,,, | Performed by: INTERNAL MEDICINE

## 2024-11-13 PROCEDURE — 3078F DIAST BP <80 MM HG: CPT | Mod: CPTII,S$GLB,, | Performed by: FAMILY MEDICINE

## 2024-11-13 PROCEDURE — 93005 ELECTROCARDIOGRAM TRACING: CPT | Mod: S$GLB,,, | Performed by: FAMILY MEDICINE

## 2024-11-13 PROCEDURE — 3044F HG A1C LEVEL LT 7.0%: CPT | Mod: CPTII,S$GLB,, | Performed by: ORTHOPAEDIC SURGERY

## 2024-11-13 PROCEDURE — 99999 PR PBB SHADOW E&M-EST. PATIENT-LVL V: CPT | Mod: PBBFAC,HCNC,, | Performed by: ORTHOPAEDIC SURGERY

## 2024-11-13 PROCEDURE — 3008F BODY MASS INDEX DOCD: CPT | Mod: CPTII,S$GLB,, | Performed by: ORTHOPAEDIC SURGERY

## 2024-11-13 PROCEDURE — 99214 OFFICE O/P EST MOD 30 MIN: CPT | Mod: S$GLB,,, | Performed by: FAMILY MEDICINE

## 2024-11-13 PROCEDURE — 3078F DIAST BP <80 MM HG: CPT | Mod: CPTII,S$GLB,, | Performed by: ORTHOPAEDIC SURGERY

## 2024-11-13 PROCEDURE — 1160F RVW MEDS BY RX/DR IN RCRD: CPT | Mod: CPTII,S$GLB,, | Performed by: FAMILY MEDICINE

## 2024-11-13 RX ORDER — METHOCARBAMOL 750 MG/1
750 TABLET, FILM COATED ORAL 3 TIMES DAILY
OUTPATIENT
Start: 2024-11-13

## 2024-11-13 RX ORDER — MUPIROCIN 20 MG/G
1 OINTMENT TOPICAL
OUTPATIENT
Start: 2024-11-13

## 2024-11-13 RX ORDER — MIDAZOLAM HYDROCHLORIDE 1 MG/ML
1 INJECTION, SOLUTION INTRAMUSCULAR; INTRAVENOUS
OUTPATIENT
Start: 2024-11-13 | End: 2024-11-14

## 2024-11-13 RX ORDER — FAMOTIDINE 20 MG/1
20 TABLET, FILM COATED ORAL 2 TIMES DAILY
OUTPATIENT
Start: 2024-11-13

## 2024-11-13 RX ORDER — ACETAMINOPHEN 500 MG
1000 TABLET ORAL EVERY 6 HOURS
OUTPATIENT
Start: 2024-11-13

## 2024-11-13 RX ORDER — ONDANSETRON HYDROCHLORIDE 2 MG/ML
4 INJECTION, SOLUTION INTRAVENOUS EVERY 8 HOURS PRN
OUTPATIENT
Start: 2024-11-13

## 2024-11-13 RX ORDER — SODIUM CHLORIDE 9 MG/ML
INJECTION, SOLUTION INTRAVENOUS CONTINUOUS
OUTPATIENT
Start: 2024-11-13 | End: 2024-11-14

## 2024-11-13 RX ORDER — LIDOCAINE HYDROCHLORIDE 10 MG/ML
1 INJECTION, SOLUTION EPIDURAL; INFILTRATION; INTRACAUDAL; PERINEURAL
OUTPATIENT
Start: 2024-11-13

## 2024-11-13 RX ORDER — AMOXICILLIN 250 MG
1 CAPSULE ORAL 2 TIMES DAILY
OUTPATIENT
Start: 2024-11-13

## 2024-11-13 RX ORDER — CELECOXIB 200 MG/1
200 CAPSULE ORAL DAILY
OUTPATIENT
Start: 2024-11-13

## 2024-11-13 RX ORDER — ASPIRIN 81 MG/1
81 TABLET ORAL 2 TIMES DAILY
OUTPATIENT
Start: 2024-11-13

## 2024-11-13 RX ORDER — FENTANYL CITRATE 50 UG/ML
50 INJECTION, SOLUTION INTRAMUSCULAR; INTRAVENOUS
OUTPATIENT
Start: 2024-11-13 | End: 2024-11-14

## 2024-11-13 RX ORDER — OXYCODONE HYDROCHLORIDE 10 MG/1
10 TABLET ORAL
OUTPATIENT
Start: 2024-11-13

## 2024-11-13 RX ORDER — OXYCODONE HYDROCHLORIDE 5 MG/1
5 TABLET ORAL
OUTPATIENT
Start: 2024-11-13

## 2024-11-13 RX ORDER — MORPHINE SULFATE 2 MG/ML
2 INJECTION, SOLUTION INTRAMUSCULAR; INTRAVENOUS
OUTPATIENT
Start: 2024-11-13

## 2024-11-13 RX ORDER — MUPIROCIN 20 MG/G
1 OINTMENT TOPICAL 2 TIMES DAILY
OUTPATIENT
Start: 2024-11-13 | End: 2024-11-18

## 2024-11-13 RX ORDER — PROCHLORPERAZINE EDISYLATE 5 MG/ML
5 INJECTION INTRAMUSCULAR; INTRAVENOUS EVERY 6 HOURS PRN
OUTPATIENT
Start: 2024-11-13

## 2024-11-13 RX ORDER — PREGABALIN 75 MG/1
75 CAPSULE ORAL NIGHTLY
OUTPATIENT
Start: 2024-11-13

## 2024-11-13 RX ORDER — SODIUM CHLORIDE 9 MG/ML
INJECTION, SOLUTION INTRAVENOUS
OUTPATIENT
Start: 2024-11-13

## 2024-11-13 RX ORDER — POLYETHYLENE GLYCOL 3350 17 G/17G
17 POWDER, FOR SOLUTION ORAL DAILY
OUTPATIENT
Start: 2024-11-13

## 2024-11-13 RX ORDER — FENTANYL CITRATE 50 UG/ML
25 INJECTION, SOLUTION INTRAMUSCULAR; INTRAVENOUS EVERY 5 MIN PRN
OUTPATIENT
Start: 2024-11-13

## 2024-11-13 RX ORDER — NALOXONE HCL 0.4 MG/ML
0.02 VIAL (ML) INJECTION
OUTPATIENT
Start: 2024-11-13

## 2024-11-13 NOTE — PROGRESS NOTES
Assessment:       1. Preoperative examination    2. Bilateral hip pain    3. Chronic obstructive pulmonary disease, unspecified COPD type    4. Chronic respiratory failure with hypoxia    5. Diaphragm paralysis         Plan:       Preoperative examination  -     EKG 12-lead    Bilateral hip pain    Chronic obstructive pulmonary disease, unspecified COPD type    Chronic respiratory failure with hypoxia    Diaphragm paralysis      Assessment & Plan    - Conducted preop clearance for hip surgery  - Reviewed EKG showing normal sinus rhythm with incomplete right bundle branch block, no significant changes from 2020 study  - Noted patient has bilateral diaphragm paralysis, requiring breathing support when supine  - Considered Dr. Leiva's clearance with precautions for respiratory management during surgery  - Determined patient is medically cleared for hip surgery with increased risk due to respiratory issues    SURGERY PREPARATION:   Discontinue baby aspirin 1 week prior to surgery.    CARDIAC EVALUATION:   EKG performed in office.    ORTHOPEDIC FOLLOW-UP:   Follow up on December 11th at 3:00 p.m. as scheduled with orthopedist.    PHYSICAL THERAPY:   Follow up on the 21st as scheduled for physical therapy.    FOLLOW UP:   Advised patient to check printout at checkout for confirmation of upcoming appointments.       Medication List with Changes/Refills   Current Medications    ALBUTEROL (PROVENTIL/VENTOLIN HFA) 90 MCG/ACTUATION INHALER    2 puffs every 4 hours as needed for cough, wheeze, or shortness of breath    ALBUTEROL-IPRATROPIUM (DUO-NEB) 2.5 MG-0.5 MG/3 ML NEBULIZER SOLUTION    Take 3 mLs by nebulization every 6 (six) hours as needed for Wheezing or Shortness of Breath. Rescue    ALLOPURINOL (ZYLOPRIM) 100 MG TABLET    TAKE 1 TABLET EVERY DAY    ALLOPURINOL (ZYLOPRIM) 300 MG TABLET    TAKE 1 TABLET EVERY DAY    ASCORBIC ACID, VITAMIN C, (VITAMIN C) 1000 MG TABLET    Take 1,000 mg by mouth once daily.    ASPIRIN  (ECOTRIN) 81 MG EC TABLET    Take 81 mg by mouth once daily.    BUMETANIDE (BUMEX) 2 MG TABLET    Take 1 tablet (2 mg total) by mouth once daily.    CALCIUM CARBONATE (OS-VÍCTOR) 600 MG (1,500 MG) TAB    Take 600 mg by mouth once daily.    CHLORHEXIDINE (HIBICLENS) 4 % EXTERNAL LIQUID    WASH DAILY FOR 3 DAYS THEN 3 DAYS PER WEEK FOR 2 WEEKS- REPEAT AS NEEDED    CLINDAMYCIN (CLEOCIN T) 1 % EXTERNAL SOLUTION    Apply topically 2 (two) times daily.    CYANOCOBALAMIN (VITAMIN B-12) 100 MCG TABLET    Take 100 mcg by mouth once daily.    FISH OIL-OMEGA-3 FATTY ACIDS 300-1,000 MG CAPSULE    Take 1,200 mg by mouth once daily.    HYDROCODONE-ACETAMINOPHEN (NORCO)  MG PER TABLET    Take 1 tablet by mouth every 8 (eight) hours as needed for Pain.     MG TABLET    TAKE 1 TABLET THREE TIMES DAILY    KETOCONAZOLE (NIZORAL) 2 % SHAMPOO    Apply topically 3 (three) times a week.    LORATADINE (CLARITIN) 10 MG TABLET    Take 10 mg by mouth once daily.    METFORMIN (GLUCOPHAGE-XR) 500 MG ER 24HR TABLET    TAKE 1 TABLET EVERY MORNING WITH BREAKFAST    MONTELUKAST (SINGULAIR) 10 MG TABLET    TAKE 1 TABLET EVERY EVENING    NEOMYCIN-POLYMYXIN-HYDROCORTISONE (CORTISPORIN) 3.5-10,000-1 MG/ML-UNIT/ML-% OTIC SUSPENSION    Place 3 drops into the left ear 3 (three) times daily.    POTASSIUM CHLORIDE SA (K-DUR,KLOR-CON) 20 MEQ TABLET    Take 2 tablets (40 mEq total) by mouth 2 (two) times daily.    ROSUVASTATIN (CRESTOR) 5 MG TABLET    TAKE 1 TABLET EVERY DAY    STIOLTO RESPIMAT 2.5-2.5 MCG/ACTUATION MIST    INHALE 2 PUFFS INTO THE LUNGS EVERY DAY    TADALAFIL (CIALIS) 10 MG TABLET    Take 1 tablet (10 mg total) by mouth daily as needed for Erectile Dysfunction.    TOFACITINIB (XELJANZ XR) 11 MG TB24    Take 11 mg by mouth once daily.    TRAZODONE (DESYREL) 100 MG TABLET    Take 1 tablet (100 mg total) by mouth every evening. At bedtime         Subjective:    Patient ID: Mina Zelaya is a 59 y.o. male.  Chief Complaint: Surgery  Clearance    HPI  History of Present Illness    CHIEF COMPLAINT:  Patient presents for preoperative clearance for right hip replacement surgery.    HPI:  Patient is scheduled for right hip replacement surgery with Dr. Desir. He has bilateral diaphragm paralysis, affecting his ability to breathe when supine. Patient is unable to breathe independently in a supine position and feels anxious without oxygen support. He uses oxygen nocturnally and requires a noninvasive ventilator for breathing support. Dr. Leiva previously evaluated the patient, noting potential respiratory failure risk if placed supine without breathing support, even for short durations. Patient reports prior umbilical hernia repair and right biceps tendon rupture, both pre-USP, with no complications. He describes persistent tenderness and discomfort, particularly when sedentary.    Patient denies recent illness, injury, unexplained fevers, chills, increased shortness of breath above baseline, unexplained cough, chest pain, vomiting, diarrhea, dysuria, unexplained bruising, open sores, wounds, stroke, or heart attack.    MEDICATIONS:  Patient is on daily baby aspirin and uses oxygen at night.    MEDICAL HISTORY:  Patient has a history of bilateral diaphragm paralysis, which requires noninvasive ventilator support when he is in a supine position.    SURGICAL HISTORY:  Patient underwent an umbilical hernia repair and a right-side biceps tendon rupture repair, both performed before his USP.    TEST RESULTS:  He underwent an EKG today, which showed normal sinus rhythm with incomplete right bundle branch block. There were no significant changes compared to the prior study from 2020.    SOCIAL HISTORY:  Patient is retired.      ROS:  Constitutional: -fevers, -chills  Respiratory: -shortness of breath, -cough  Cardiovascular: -chest pain  Gastrointestinal: -vomiting, -diarrhea  Genitourinary: -dysuria  Musculoskeletal: +muscle pain  Psychiatric:  "+anxiety       Review of Systems    Objective:      Vitals:    11/13/24 0752   BP: 130/62   BP Location: Left arm   Patient Position: Sitting   Pulse: 82   Resp: 18   Temp: 97.9 °F (36.6 °C)   TempSrc: Temporal   SpO2: 95%   Weight: 113.6 kg (250 lb 7.1 oz)   Height: 5' 10" (1.778 m)     BP Readings from Last 5 Encounters:   11/13/24 130/62   11/07/24 117/65   10/04/24 138/76   08/08/24 112/65   04/22/24 124/82     Wt Readings from Last 5 Encounters:   11/13/24 113.6 kg (250 lb 7.1 oz)   11/07/24 109.6 kg (241 lb 10 oz)   10/04/24 109.4 kg (241 lb 2.9 oz)   08/08/24 112.7 kg (248 lb 7.3 oz)   04/22/24 114.5 kg (252 lb 8.6 oz)     Physical Exam  Physical Exam    General: Well-developed. Well-nourished. No acute distress.  Eyes: EOMI. Sclerae anicteric.  HENT: Normocephalic. Atraumatic. Nares patent. Moist oral mucosa.  Cardiovascular: Regular rate. Regular rhythm. No murmurs. No rubs. No gallops. Normal S1, S2.  Respiratory: Normal respiratory effort. Clear to auscultation bilaterally. No rales. No rhonchi. No wheezing.  Musculoskeletal: No  obvious deformity.  Extremities: No lower extremity edema.  Neurological: Alert & oriented x3. No slurred speech. Normal gait.  Psychiatric: Normal mood. Normal affect. Good insight. Good judgment.  Skin: Warm. Dry. No rash.  Mouth: Oropharynx is clear.         Lab Results   Component Value Date    WBC 5.91 03/27/2024    HGB 13.0 (L) 03/27/2024    HCT 40.7 03/27/2024     03/27/2024    CHOL 203 (H) 04/26/2024    TRIG 54 04/26/2024    HDL 50 04/26/2024    ALT 22 04/26/2024    AST 31 04/26/2024     04/26/2024    K 4.1 04/26/2024     04/26/2024    CREATININE 0.8 04/26/2024    BUN 19 04/26/2024    CO2 25 04/26/2024    TSH 1.452 11/28/2022    PSA 1.0 04/26/2024    INR 1.0 07/14/2020    HGBA1C 5.8 (H) 04/26/2024      This note was generated with the assistance of ambient listening technology. Verbal consent was obtained by the patient and accompanying visitor(s) for " the recording of patient appointment to facilitate this note. I attest to having reviewed and edited the generated note for accuracy, though some syntax or spelling errors may persist. Please contact the author of this note for any clarification.

## 2024-11-13 NOTE — PROGRESS NOTES
Patient ID: Mina Zelaya is a 59 y.o. male    Pain of the Right Knee      History of Present Illness:     Patient returns today for follow up of bilateral hip pain.  Saw us about a year and a half ago for the same issue.  He initially did formal physical therapy with some improvement in regards to range of motion.  However his hip is still very painful.  Right hip pain is the worst.  Does have history of chronic steroid use as well as alcohol use.  He quit drinking 3 years ago.  He has diaphragmatic paralysis and was sent to [facility] for any surgical intervention due to complexity of case.  They sent him to pain management pain management sent him for second opinion of his diaphragmatic paralysis.  He is now back today for continued hip pain.  Has been cleared by his PCP.  Has not done boot camp yet.      PAST MEDICAL HISTORY:   Past Medical History:   Diagnosis Date    Arthritis     Asthma     Chronic bronchitis     Emphysema of lung     Heavy alcohol consumption 10/01/2018    Type 2 diabetes mellitus with hyperlipidemia 01/25/2018     PAST SURGICAL HISTORY:   Past Surgical History:   Procedure Laterality Date    REPAIR, TENDON, BICEPS, DISTAL Right     UMBILICAL HERNIA REPAIR       FAMILY HISTORY:   Family History   Problem Relation Name Age of Onset    Stroke Mother      No Known Problems Father      Pneumonia Sister      Stroke Brother      No Known Problems Maternal Aunt      Stroke Paternal Aunt      Cancer Paternal Uncle      Cancer Maternal Grandmother      Heart failure Maternal Grandfather      Stroke Paternal Grandmother      No Known Problems Paternal Grandfather      Stroke Brother      No Known Problems Brother      No Known Problems Brother       SOCIAL HISTORY:   Social History     Occupational History    Not on file   Tobacco Use    Smoking status: Former     Current packs/day: 0.00     Average packs/day: 1 pack/day for 15.0 years (15.0 ttl pk-yrs)     Types: Cigarettes     Start date: 6/6/1988      Quit date: 2003     Years since quittin.4    Smokeless tobacco: Never   Substance and Sexual Activity    Alcohol use: Yes     Alcohol/week: 25.0 standard drinks of alcohol     Types: 25 Cans of beer per week    Drug use: No    Sexual activity: Not Currently     Partners: Female        MEDICATIONS:   Current Outpatient Medications:     albuterol (PROVENTIL/VENTOLIN HFA) 90 mcg/actuation inhaler, 2 puffs every 4 hours as needed for cough, wheeze, or shortness of breath, Disp: 54 g, Rfl: 3    albuterol-ipratropium (DUO-NEB) 2.5 mg-0.5 mg/3 mL nebulizer solution, Take 3 mLs by nebulization every 6 (six) hours as needed for Wheezing or Shortness of Breath. Rescue, Disp: 120 vial, Rfl: 5    allopurinoL (ZYLOPRIM) 100 MG tablet, TAKE 1 TABLET EVERY DAY, Disp: 90 tablet, Rfl: 3    allopurinoL (ZYLOPRIM) 300 MG tablet, TAKE 1 TABLET EVERY DAY, Disp: 90 tablet, Rfl: 3    ascorbic acid, vitamin C, (VITAMIN C) 1000 MG tablet, Take 1,000 mg by mouth once daily., Disp: , Rfl:     aspirin (ECOTRIN) 81 MG EC tablet, Take 81 mg by mouth once daily., Disp: , Rfl:     bumetanide (BUMEX) 2 MG tablet, Take 1 tablet (2 mg total) by mouth once daily., Disp: 90 tablet, Rfl: 3    calcium carbonate (OS-VÍCTOR) 600 mg (1,500 mg) Tab, Take 600 mg by mouth once daily., Disp: , Rfl:     chlorhexidine (HIBICLENS) 4 % external liquid, WASH DAILY FOR 3 DAYS THEN 3 DAYS PER WEEK FOR 2 WEEKS- REPEAT AS NEEDED, Disp: 236 mL, Rfl: 1    clindamycin (CLEOCIN T) 1 % external solution, Apply topically 2 (two) times daily., Disp: 60 mL, Rfl: 1    cyanocobalamin (VITAMIN B-12) 100 MCG tablet, Take 100 mcg by mouth once daily., Disp: , Rfl:     fish oil-omega-3 fatty acids 300-1,000 mg capsule, Take 1,200 mg by mouth once daily., Disp: , Rfl:     HYDROcodone-acetaminophen (NORCO)  mg per tablet, Take 1 tablet by mouth every 8 (eight) hours as needed for Pain., Disp: 90 tablet, Rfl: 0     mg tablet, TAKE 1 TABLET THREE TIMES DAILY, Disp:  270 tablet, Rfl: 3    ketoconazole (NIZORAL) 2 % shampoo, Apply topically 3 (three) times a week., Disp: 120 mL, Rfl: 3    loratadine (CLARITIN) 10 mg tablet, Take 10 mg by mouth once daily., Disp: , Rfl:     metFORMIN (GLUCOPHAGE-XR) 500 MG ER 24hr tablet, TAKE 1 TABLET EVERY MORNING WITH BREAKFAST, Disp: 90 tablet, Rfl: 1    montelukast (SINGULAIR) 10 mg tablet, TAKE 1 TABLET EVERY EVENING, Disp: 90 tablet, Rfl: 1    neomycin-polymyxin-hydrocortisone (CORTISPORIN) 3.5-10,000-1 mg/mL-unit/mL-% otic suspension, Place 3 drops into the left ear 3 (three) times daily., Disp: 10 mL, Rfl: 0    potassium chloride SA (K-DUR,KLOR-CON) 20 MEQ tablet, Take 2 tablets (40 mEq total) by mouth 2 (two) times daily., Disp: 360 tablet, Rfl: 1    rosuvastatin (CRESTOR) 5 MG tablet, TAKE 1 TABLET EVERY DAY, Disp: 90 tablet, Rfl: 3    STIOLTO RESPIMAT 2.5-2.5 mcg/actuation Mist, INHALE 2 PUFFS INTO THE LUNGS EVERY DAY, Disp: 12 g, Rfl: 5    tadalafiL (CIALIS) 10 MG tablet, Take 1 tablet (10 mg total) by mouth daily as needed for Erectile Dysfunction., Disp: 10 tablet, Rfl: 11    tofacitinib (XELJANZ XR) 11 mg Tb24, Take 11 mg by mouth once daily., Disp: , Rfl:     traZODone (DESYREL) 100 MG tablet, Take 1 tablet (100 mg total) by mouth every evening. At bedtime, Disp: 90 tablet, Rfl: 3  ALLERGIES:   Review of patient's allergies indicates:   Allergen Reactions    Atorvastatin      myalgia    Sulfa (sulfonamide antibiotics) Rash         Physical Exam     Vitals:    11/13/24 1304   BP: 130/62   Pulse: 82       Alert and oriented to person, place and time. No acute distress. Well-groomed, not ill appearing. Pupils round and reactive, normal respiratory effort, no audible wheezing.       General:  The patient is alert and oriented x 3.  Mood is pleasant.      bilateral HIP EXAMINATION     OBSERVATION / INSPECTION  Gait:   antalgic   Alignment:  Neutral   Scars:   None   Muscle atrophy: None   Effusion:  None   Warmth:  None   Leg lengths:    Equal     TENDERNESS         Trochanteric bursa   -   Piriformis    -   SI joint    -   Psoas tendon   -   Rectus insertion  -   Adductor insertion  -   Pubic symphysis  -     ROM: (* = pain)    Flexion:    100 degrees *  External rotation: 20 degrees *  Internal rotation with axial load: 30 degrees  Internal rotation without axial load: 40 degrees  Abduction:  30 degrees  Adduction:   10 * degrees    SPECIAL TESTS:  Pain w/ forced internal rotation (FADIR): +   Pain w/ forced external rotation (KAT): +  Circumduction test:    Negative   Stinchfield test:    +   Log roll:      +  Snapping hip (internal):   Negative   Sit-up pain:     Negative   Resisted sit-up pain:    +  Trendelenburg test:    Negative       EXTREMITY NEURO-VASCULAR EXAMINATION:   Sensation:  Grossly intact to light touch all dermatomal regions.   Motor Function:  Fully intact motor function at hip, knee, foot and ankle    DTRs;  quadriceps and  achilles 2+.  No clonus and downgoing Babinski.    Vascular status:  DP and PT pulses 2+, brisk capillary refill, symmetric.    Skin: intact, compartments soft.    Imaging:     Bilateral hip x-rays reviewed by me showing no evidence of acute fracture or dislocation.  There is remodeling of bilateral femoral heads, right greater than left consistent with avascular necrosis      Assessment & Plan    Avascular necrosis of bone of hip, right       Mina Zelaya is a 59 y.o. male who has radiographic and clinical evidence of right hip avascular necrosis, arthritis.     At this point the patient has failed extensive non-operative and conservative treatment with physician guided home exercise program, injections, weight loss/activity modification and NSAIDs/OTC medications. We discussed multiple options including non-operative and operative treatments. Non-operatively we discussed continuation of injections, visco supplementation, HEP and formal PT.  Operatively we discussed total hip replacement. We  discussed the pros and cons of surgery in detail as well as the risks and benefits of surgery, time required for recovery, need for physical therapy post-operatively and expectations long term. We discussed specifically the risks of loosening, instability, dislocation, superficial and deep periprosthetic joint infection, arthrofibrosis and DVT/PE. After extensive discussion with the patient and shared decision making, they have elected to undergo joint replacement to improve function and pain.     _________________________________________________________________      Mina Zelaya will be scheduled for total joint arthroplasty of the right Hip      Post-Op Medications to be prescribed:   Percocet 5/325mg Take 1-2 tablets every 4-6 hours PRN pain #28  Zofran 4mg oral disintegrating tablets every 8 hours PRN nausea/vomiting   ASA 81mg BID x 4 weeks  Motrin 600 mg TID PRN     The mobility limitations from surgery cannot be sufficiently resolved by the use of a cane. Patient' functional mobility deficit can be sufficiently resolved with the use of a rolling walker or walker. Patient mobility limitation significantly impairs their ability to participate in one or more activities of daily living. The use of a walker or rolling walker will significantly improve the patient ability to participate in MRADLS and the patient will use it on a regular basis in the home.      Medical Clearance: Yes  Hx of DVT,PE, anesthetic complications: No, history of diaphragm paralysis  Pre-operative Ancef and Tranexamic Acid  Joint BootCamp needs to be done    Additional notes/concerns:  Diaphragmatic paralysis.  Likely general anesthesia

## 2024-11-14 DIAGNOSIS — M87.051 AVASCULAR NECROSIS OF BONE OF HIP, RIGHT: Primary | ICD-10-CM

## 2024-11-22 DIAGNOSIS — M87.051 AVASCULAR NECROSIS OF BONE OF HIP, RIGHT: Primary | ICD-10-CM

## 2024-12-13 DIAGNOSIS — Z96.641 S/P HIP REPLACEMENT, RIGHT: Primary | ICD-10-CM

## 2024-12-17 ENCOUNTER — OFFICE VISIT (OUTPATIENT)
Dept: ORTHOPEDICS | Facility: CLINIC | Age: 59
End: 2024-12-17
Payer: MEDICARE

## 2024-12-17 VITALS
BODY MASS INDEX: 35.66 KG/M2 | SYSTOLIC BLOOD PRESSURE: 116 MMHG | WEIGHT: 249.13 LBS | HEART RATE: 77 BPM | HEIGHT: 70 IN | DIASTOLIC BLOOD PRESSURE: 56 MMHG

## 2024-12-17 DIAGNOSIS — Z96.641 S/P HIP REPLACEMENT, RIGHT: Primary | ICD-10-CM

## 2024-12-17 DIAGNOSIS — G89.18 ACUTE POST-OPERATIVE PAIN: ICD-10-CM

## 2024-12-17 PROCEDURE — 3074F SYST BP LT 130 MM HG: CPT | Mod: CPTII,S$GLB,,

## 2024-12-17 PROCEDURE — 1159F MED LIST DOCD IN RCRD: CPT | Mod: CPTII,S$GLB,,

## 2024-12-17 PROCEDURE — 99999 PR PBB SHADOW E&M-EST. PATIENT-LVL V: CPT | Mod: PBBFAC,,,

## 2024-12-17 PROCEDURE — 3078F DIAST BP <80 MM HG: CPT | Mod: CPTII,S$GLB,,

## 2024-12-17 PROCEDURE — 3044F HG A1C LEVEL LT 7.0%: CPT | Mod: CPTII,S$GLB,,

## 2024-12-17 PROCEDURE — 99024 POSTOP FOLLOW-UP VISIT: CPT | Mod: S$GLB,,,

## 2024-12-17 RX ORDER — OXYCODONE AND ACETAMINOPHEN 5; 325 MG/1; MG/1
1 TABLET ORAL EVERY 4 HOURS PRN
Qty: 28 EACH | Refills: 0 | Status: SHIPPED | OUTPATIENT
Start: 2024-12-17

## 2024-12-17 NOTE — PROGRESS NOTES
Post-op Note    HPI    Mina Zelaya is here 2 weeks s/p the following procedure:     DOS: 12/2/2024  Left total hip arthroplasty    Overall doing well. Pain controlled on current regimen. He currently has home health and is transition to outpatient Physical Therapy. Denies any chest pain.  Denies any drainage from the incision. Denies any fevers, chills or paresthesias. Completed doxycycline. States he is at his baseline for SOB.     DVT Prophylaxis: asa daily, has not been taking bid      Physical Exam:     Patient is alert and oriented no acute distress.   Assistive Device: walking stick    Right hip: aquacel removed, Incision(s) are well healed.  There is no evidence of dehiscence.  There is no induration erythema or signs of infection.  Appropriate soft tissue swelling.  Compartments are soft and compressible.  Warm well-perfused extremity.    Imaging:     I have personally reviewed the following imaging and these are an interpretation of my findings:     X-Ray: I have reviewed all pertinent results/findings and my personal findings are:  s/p R INA in good alignment without evidence of hardware failure or complication    Assessment    Mina Zelaya is 2 weeks Post-op     Plan:    Overall doing as expected.  We discussed expectations of surgery and postoperative course.     Pain: Continued postoperative pain regimen -- percocet refill sent  DVT prophylaxis: cont asa bid x 2 weeks, instructed to change to twice a day x 2 weeks, then return to daily  PT/OT: Okay to start PT, weight bearing as tolerated, posterior precautions x 4 weeks    In 24 hours, you may shower without covering your incision.  Do not submerge your incision for another 2 weeks.  If steri strips applied, they can get wet, remove in 48-72 hours if not falling off on their own. Do not submerge incision for another 2 weeks. You may start to do a scar massage with vitamin-E oil/cocoa butter to your incisions. Please inform the clinic if you  experience any bleeding or discharge, warmth, or redness.       Follow-up: 4 weeks   X-rays next visit: left hip 1 view

## 2024-12-19 ENCOUNTER — TELEPHONE (OUTPATIENT)
Dept: PULMONOLOGY | Facility: CLINIC | Age: 59
End: 2024-12-19
Payer: MEDICARE

## 2024-12-19 NOTE — TELEPHONE ENCOUNTER
Spoke to patient.  Scheduled him a virtual visit to help talk with Dr. Leiva about establishing his sleep apnea.  His current provider isn't going to be a [employer] provider anymore.  He will need a new CPAP machine.  He aslo asked if we had Xarelto sample, to which we did not.   Pt verbalized understanding.

## 2024-12-19 NOTE — TELEPHONE ENCOUNTER
----- Message from Tunde sent at 12/19/2024 10:16 AM CST -----  Contact: self  Type: Needs Medical Advice  Who Called:  PT    Best Call Back Number: [phone]   Additional Information: Needs a call from nurse to discuss cpap machine and medication.

## 2024-12-19 NOTE — TELEPHONE ENCOUNTER
----- Message from Abdon Pruett sent at 12/13/2024  3:05 PM CST -----    ----- Message -----  From: Cathy Ryder  Sent: 12/13/2024   1:14 PM CST  To: Cali DORAN Staff  Subject: Needs return call today                          Type: Needs Medical Advice  Who Called:  Pt    Best Call Back Number: [phone]    Additional Information: Pt states his current provider is dropping him for his cpap supplies, he needing to speak to the office regarding this please mao

## 2024-12-23 ENCOUNTER — OFFICE VISIT (OUTPATIENT)
Dept: PULMONOLOGY | Facility: CLINIC | Age: 59
End: 2024-12-23
Payer: MEDICARE

## 2024-12-23 VITALS — WEIGHT: 249.13 LBS | HEIGHT: 70 IN | BODY MASS INDEX: 35.66 KG/M2

## 2024-12-23 DIAGNOSIS — J44.9 COPD MIXED TYPE: ICD-10-CM

## 2024-12-23 DIAGNOSIS — J98.6 DIAPHRAGM PARALYSIS: Primary | ICD-10-CM

## 2024-12-23 DIAGNOSIS — J44.9 CHRONIC OBSTRUCTIVE PULMONARY DISEASE, UNSPECIFIED COPD TYPE: ICD-10-CM

## 2024-12-23 DIAGNOSIS — G47.33 OSA (OBSTRUCTIVE SLEEP APNEA): ICD-10-CM

## 2024-12-23 DIAGNOSIS — J45.40 MODERATE PERSISTENT ASTHMA, UNCOMPLICATED: ICD-10-CM

## 2024-12-23 PROCEDURE — 3008F BODY MASS INDEX DOCD: CPT | Mod: CPTII,95,, | Performed by: INTERNAL MEDICINE

## 2024-12-23 PROCEDURE — 99213 OFFICE O/P EST LOW 20 MIN: CPT | Mod: 95,,, | Performed by: INTERNAL MEDICINE

## 2024-12-23 PROCEDURE — 3044F HG A1C LEVEL LT 7.0%: CPT | Mod: CPTII,95,, | Performed by: INTERNAL MEDICINE

## 2024-12-23 PROCEDURE — 1159F MED LIST DOCD IN RCRD: CPT | Mod: CPTII,95,, | Performed by: INTERNAL MEDICINE

## 2024-12-23 RX ORDER — PREDNISONE 20 MG/1
TABLET ORAL
COMMUNITY

## 2024-12-23 RX ORDER — ITRACONAZOLE 100 MG/1
CAPSULE ORAL
COMMUNITY

## 2024-12-23 RX ORDER — IPRATROPIUM BROMIDE AND ALBUTEROL SULFATE 2.5; .5 MG/3ML; MG/3ML
3 SOLUTION RESPIRATORY (INHALATION) EVERY 6 HOURS PRN
Qty: 120 EACH | Refills: 5 | Status: SHIPPED | OUTPATIENT
Start: 2024-12-23 | End: 2026-01-28

## 2024-12-23 NOTE — PROGRESS NOTES
The patient location is: home   The chief complaint leading to consultation is: resp failure    Visit type: audiovisual    Face to Face time with patient:  15  16 minutes of total time spent on the encounter, which includes face to face time and non-face to face time preparing to see the patient (eg, review of tests), Obtaining and/or reviewing separately obtained history, Documenting clinical information in the electronic or other health record, Independently interpreting results (not separately reported) and communicating results to the patient/family/caregiver, or Care coordination (not separately reported).         Each patient to whom he or she provides medical services by telemedicine is:  (1) informed of the relationship between the physician and patient and the respective role of any other health care provider with respect to management of the patient; and (2) notified that he or she may decline to receive medical services by telemedicine and may withdraw from such care at any time.    Notes:      12/23/2024    Mina Zelaya  Office Note    Chief Complaint   Patient presents with    Follow-up    COPD       HPI:    December 23, 2024-needs bipap or home vent,  believes order needs to go to [facility].  Out of stiolto and too$$.  Will send in [employer] .  Did well with hip surg.. cannot drive few weeks    Newly  to nurse..      8/8/2024--infusion rx colitis  Saw Dr Lew-- felt to have aseptic necrosis but orhro retired and no clear mri dx.. hip x rays results viewed from 8/2023     Some wt loss    Breatghing same --uses niv, bipap not arranged..    Patient Instructions   Non invasive ventilator  working well -- need to continue.....    Will ask staff to get setting on non invasive ventilator.   Will place bipap order to mimic non invasive ventilator.      You use reclining bed and need to sleep left side down as right hip is severe with right side down...      You cannot tolerate supine for mri,  may  "consider just total hip replacement.  Consider orthopedic referral..      Will order xray hips    Would order radionuclide bone scan if hip xrays not diagnostic.    If clear diagnosis seen on xray -- orthopedic follow up reasonable from pulmonary perspective..  othopedic follow up reasonable anyhow..    5/31/2023 didn't get dental wk as cannot lay back had partial root canal-- no infection issues. Uses niv for sleep, cannot lay flat, starr, very limited with satrr-- barely able to breath. Uses inhalers.....  H/o bilat diaphragm paralysis....  On rx for pre diabetes    Niv copay 200/month -- pt would prefer less copay.  Willing to try bipap    Patient Instructions   [facility] attending statement done today  Cxr and ct chest 4/2016 viewed-- lungs good but high diaphragms..  Exam shows evidence for ongoing diaphragm paralysis    Weight loss should help breathing dramatically-- consider :::from uptodate----Tirzepatide is a novel GLP-1 and GIP receptor agonist administered by once-weekly subcutaneous injection [38]. It is effective in the treatment of obesity in patients with and without diabetes mellitus [9] (see "Glucagon-like peptide 1-based therapies for the treatment of type 2 diabetes mellitus", section on 'Weight loss'). As examples:  ?In an open-label trial including over 1800 patients with diabetes, once-weekly tirzepatide (in varying doses) was compared with semaglutide 1 mg [39]. At 40 weeks, reduction in body weight with all doses of tirzepatide was greater compared with semaglutide (5, 10, and 15 mg of tirzepatide; -7.6, -9.3, and -11.2 kg, respectively: 1 mg of semaglutide; -5.7 kg). Of note, no participants received semaglutide of 2.4 mg once weekly, which is the recommended dose for treatment of obesity. (See 'Dosing and contraindications' above.)  ?In a double-blind placebo-controlled randomized trial including over 2500 adults with obesity (but without diabetes), tirzepatide once weekly was compared with " placebo [9]. At 72 weeks, reduction in body weight at all tirzepatide doses (5, 10, and 15 mg) was greater compared with placebo (-16.1, -22.2, and -23.6 kg, respectively, versus -2.4 kg).        Would use bipap to cut cost as able.  Niv would be preferred.  10/3/2022 had chr drip and cough, uses niv -- couldn't sleep without niv as would stop breathing.  Having severe hip problems. Limited by hips somewhat but also hip burning pain.  No prednisone nor abx, uses stiolto.  Grandson entering [employer].    Patient Instructions   You are using and benefiting from non invasive ventilator.      Would recommend considering Ozempic for diabetes as my facilitate weight loss.  Weight loss will help respiratory failure and hip problems.    Hip therapy may help mobility.     Would recommend six min walk and portable oxygen concentrator.     Letter done for dental work    We briefly discuss weight loss surgery.      3/25/2021 - uses niv machine all night and freq day -- pt had insurance change and needs paper work completed.  Pt cannot lay flat.  Got Moderna vaccine.      Patient Instructions   You are using and having great benefit from non invasive ventilator.  You had had numerous hospital stays due respiratory failure prior to using non invasive ventilator.  You have bilateral diaphragm paralysis problems prior to 2008 by your symptoms.  You need to continue this treatment.  1/22/2020- breathing so bad cannot do activity.  No channge, needs rescue meds to help but not relieve sob, uses niv nightly - use roughly 8-10 hrs daily.  Uses stiolto with good results, no abx used.      Patient Instructions   No great changes, need prompt evaluation for lung problems.    May need antibiotic.    You need stiolto    May 22, 2019- breathing difficult today, SOB worse with exertion, states recue inhaler does not help much. Worsened in past 24 hours, affected with weather change. Currently using NIV nightly. Nosebleeds resolved. NO  prednisone use in past year. Cold in Nov 2018, tx antibiotics. On supplemental oxygen wearing 2-4 hours daily and all night. Currently using stiolto daily.   Discussed with patient above for education the following:    Continuation of current therapy  Continue NIV therapy for diaphragm paralysis induced respiratory failure.  Continue Stiolto daily    Cause of paralysis not clear, not likely reversable.    Plication of diaphragm is invasive surgical, may not help much, useful for one side paralysis.  Could have eval at main campus?  Follow here later?    Discussed weight loss services at [facility], if interested contact clinic for referral  Abdominal weight does effect ability to breath when bending over.    Will try new medication for 2 weeks, Trelegy 1 puff daily, if not benefit return to Stiolto daily, if you feel a benefit contact clinic and will order  April 16, 2018- lives alone, sees grandson daily post school, having freq nose bleed, uses full face mask for niv.  Frustrated, marked starr.    jan 11, 2017- inactivity ppt weight gain, mood bad at times, daughter and grandson moved out and  Pt feeling down a bit.     Uses o2 and non invasive ventilator -- extreme air hunger with activity and supine. aspriates 2/day to 2 /week.  Uses bronchial rx.      Uses niv and o2 every night for sleep and for naps 1-2 daily.  Feels like worsening.    Sept 9, 2016HPI: worse off advair and spiriva, irratents worsen, niv used 12/24 daily, cannot bend over and uc doing poorly due to abd pain.        The chief compliant  problem varies with instablilty at time    PFSH:  Past Medical History:   Diagnosis Date    Arthritis     Asthma     Chronic bronchitis     Diaphragm paralysis     Emphysema of lung     Heavy alcohol consumption 10/01/2018    Type 2 diabetes mellitus with hyperlipidemia 01/25/2018         Past Surgical History:   Procedure Laterality Date    HIP ARTHROPLASTY Right 12/2/2024    Procedure: ARTHROPLASTY, HIP;  Surgeon:  "Joon Quigley MD;  Location: [facility];  Service: Orthopedics;  Laterality: Right;  right INA, [facility], patient has diaphramatic paralysis    HIP REPLACEMENT ARTHROPLASTY Right 2024    REPAIR, TENDON, BICEPS, DISTAL Right     UMBILICAL HERNIA REPAIR       Social History     Tobacco Use    Smoking status: Former     Current packs/day: 0.00     Average packs/day: 1 pack/day for 15.0 years (15.0 ttl pk-yrs)     Types: Cigarettes     Start date: 1988     Quit date: 2003     Years since quittin.5    Smokeless tobacco: Never   Substance Use Topics    Alcohol use: Not Currently     Alcohol/week: 25.0 standard drinks of alcohol     Types: 25 Cans of beer per week    Drug use: No     Family History   Problem Relation Name Age of Onset    Stroke Mother      No Known Problems Father      Pneumonia Sister      Stroke Brother      No Known Problems Maternal Aunt      Stroke Paternal Aunt      Cancer Paternal Uncle      Cancer Maternal Grandmother      Heart failure Maternal Grandfather      Stroke Paternal Grandmother      No Known Problems Paternal Grandfather      Stroke Brother      No Known Problems Brother      No Known Problems Brother       Review of patient's allergies indicates:   Allergen Reactions    Sulfa (sulfonamide antibiotics) Rash       Performance Status:The patient's activity level is housebound activities.      Review of Systems:  a review of eleven systems covering constitutional, Eye, HEENT, Psych, Respiratory, Cardiac, GI, , Musculoskeletal, Endocrine, Dermatologic was negative except for pertinent findings as listed ABOVE and below: all good except r elbow arthritis and neck pain SOB with exertion.    Exam:Comprehensive exam done.   Ht 5' 10" (1.778 m)   Wt 113 kg (249 lb 1.9 oz)   BMI 35.74 kg/m²   Exam included Vitals as listed, and patient's appearance and affect and alertness and mood, oral exam for yeast and hygiene and pharynx lesions and Mallapatti (M) score, neck " with inspection for jvd and masses and thyroid abnormalities and lymph nodes (supraclavicular and infraclavicular nodes and axillary also examined and noted if abn), chest exam included symmetry and effort and fremitus and percussion and auscultation, cardiac exam included rhythm and gallops and murmur and rubs and jvd and edema, abdominal exam for mass and hepatosplenomegaly and tenderness and hernias and bowel sounds, Musculoskeletal exam with muscle tone and posture and mobility/gait and  strength, and skin for rashes and cyanosis and pallor and turgor, extremity for clubbing.  Findings were normal except for pertinent findings listed below: affect flat  M3 and no diaphragm motion bilaterally-- percussion and palpation no abd movement, good bs but shallow.    Radiographs reviewed: was not done   XR CHEST PA AND LATERAL 11/07/2018   Redemonstration of mild bilateral basal lung stranding a chronic finding apparently.  No new pulmonary changes.    The cardiac silhouette is normal in size. The hilar and mediastinal contours are unremarkable.      Labs reviewed  Results for HEAVEN HARDIN (MRN [id number]) as of 5/22/2019 09:29   Ref. Range 2/11/2019 07:14   Sodium Latest Ref Range: 136 - 145 mmol/L 139   Potassium Latest Ref Range: 3.5 - 5.1 mmol/L 3.6   Chloride Latest Ref Range: 101 - 111 mmol/L 99 (L)   CO2 Latest Ref Range: 23 - 29 mmol/L 27   Anion Gap Latest Ref Range: 8 - 16 mmol/L 13   BUN, Bld Latest Ref Range: 6 - 20 mg/dL 17   Creatinine Latest Ref Range: 0.5 - 1.4 mg/dL 0.8   eGFR if non African American Latest Ref Range: >60 mL/min/1.73 m^2 >60.0   eGFR if African American Latest Ref Range: >60 mL/min/1.73 m^2 >60.0   Glucose Latest Ref Range: 74 - 118 mg/dL 123 (H)   Calcium Latest Ref Range: 8.6 - 10.0 mg/dL 8.8       PFT was not done    Plan:  Clinical impression is resonably certain and repeated evaluation prn +/- follow up will be needed as below.    Heaven was seen today for follow-up and shortalyssa  of breath.    Diagnoses and all orders for this visit:    Diaphragm paralysis   - Continue NIV therapy  COPD mixed type  -     montelukast (SINGULAIR) 10 mg tablet; Take 1 tablet (10 mg total) by mouth every evening.  -     albuterol-ipratropium (DUO-NEB) 2.5 mg-0.5 mg/3 mL nebulizer solution; Take 3 mLs by nebulization every 6 (six) hours as needed for Wheezing or Shortness of Breath. Rescue  -     VENTOLIN HFA 90 mcg/actuation inhaler; Inhale 1 puff into the lungs 4 (four) times daily as needed.    Moderate persistent asthma, uncomplicated  -     montelukast (SINGULAIR) 10 mg tablet; Take 1 tablet (10 mg total) by mouth every evening.  -     albuterol-ipratropium (DUO-NEB) 2.5 mg-0.5 mg/3 mL nebulizer solution; Take 3 mLs by nebulization every 6 (six) hours as needed for Wheezing or Shortness of Breath. Rescue  -     VENTOLIN HFA 90 mcg/actuation inhaler; Inhale 1 puff into the lungs 4 (four) times daily as needed.    Chronic respiratory failure with hypoxia   - continue NIV therapy  Chronic sinus complaints  -     montelukast (SINGULAIR) 10 mg tablet; Take 1 tablet (10 mg total) by mouth every evening.    Chronic obstructive pulmonary disease, unspecified COPD type  -     albuterol-ipratropium (DUO-NEB) 2.5 mg-0.5 mg/3 mL nebulizer solution; Take 3 mLs by nebulization every 6 (six) hours as needed for Wheezing or Shortness of Breath. Rescue    Class 2 obesity with alveolar hypoventilation, serious comorbidity, and body mass index (BMI) of 37.0 to 37.9 in adult   - discussed [facility] weight loss services      Follow up in about 6 months (around 6/23/2025), or if symptoms worsen or fail to improve.    Discussed with patient above for education the following:         Mina was seen today for follow-up and copd.    Diagnoses and all orders for this visit:    Diaphragm paralysis  -     albuterol-ipratropium (DUO-NEB) 2.5 mg-0.5 mg/3 mL nebulizer solution; Take 3 mLs by nebulization every 6 (six) hours as needed for  Wheezing or Shortness of Breath. Rescue  -     BIPAP FOR HOME USE  -     VENTILATOR FOR HOME USE    Chronic obstructive pulmonary disease, unspecified COPD type  -     albuterol-ipratropium (DUO-NEB) 2.5 mg-0.5 mg/3 mL nebulizer solution; Take 3 mLs by nebulization every 6 (six) hours as needed for Wheezing or Shortness of Breath. Rescue    Moderate persistent asthma, uncomplicated  -     albuterol-ipratropium (DUO-NEB) 2.5 mg-0.5 mg/3 mL nebulizer solution; Take 3 mLs by nebulization every 6 (six) hours as needed for Wheezing or Shortness of Breath. Rescue    COPD mixed type  -     albuterol-ipratropium (DUO-NEB) 2.5 mg-0.5 mg/3 mL nebulizer solution; Take 3 mLs by nebulization every 6 (six) hours as needed for Wheezing or Shortness of Breath. Rescue    MARGARITO (obstructive sleep apnea)                Follow up in about 6 months (around 6/23/2025), or if symptoms worsen or fail to improve.    Discussed with patient above for education the following:      Patient Instructions   Will place orders for bipap and non invasive vent--either should work    You may wish to purchase bipap machine if expense dictates.      Use duoneb treatment in place stilto..

## 2024-12-23 NOTE — PATIENT INSTRUCTIONS
Will place orders for bipap and non invasive vent--either should work    You may wish to purchase bipap machine if expense dictates.      Use duoneb treatment in place stilto..

## 2024-12-24 ENCOUNTER — TELEPHONE (OUTPATIENT)
Dept: PULMONOLOGY | Facility: CLINIC | Age: 59
End: 2024-12-24
Payer: MEDICARE

## 2024-12-24 NOTE — TELEPHONE ENCOUNTER
----- Message from RT Suha sent at 12/23/2024  4:55 PM CST -----  Regarding: Outsource  Hey just wanted to let you know I faxed the orders for the vent and bipap to [facility] per Dr. Arcos notes. Thanks

## 2024-12-26 ENCOUNTER — TELEPHONE (OUTPATIENT)
Dept: ORTHOPEDICS | Facility: CLINIC | Age: 59
End: 2024-12-26
Payer: MEDICARE

## 2024-12-26 DIAGNOSIS — G89.18 ACUTE POST-OPERATIVE PAIN: ICD-10-CM

## 2024-12-26 RX ORDER — OXYCODONE AND ACETAMINOPHEN 5; 325 MG/1; MG/1
1 TABLET ORAL EVERY 4 HOURS PRN
Qty: 28 EACH | Refills: 0 | Status: SHIPPED | OUTPATIENT
Start: 2024-12-26 | End: 2024-12-27 | Stop reason: SDUPTHER

## 2024-12-26 NOTE — TELEPHONE ENCOUNTER
Returned the pt's call and informed him that all of the information will be sent to Dr. Quigley. Pt v/u

## 2024-12-26 NOTE — TELEPHONE ENCOUNTER
----- Message from Milvia sent at 12/26/2024  1:42 PM CST -----  Contact: Patient, [phone]  .[email or web address]     Patient is calling for Medical Advice regarding: Pain from hip replacement    How long has patient had these symptoms: On 12/02/2024    Pharmacy name and phone#:  [id number] - [place] - [address] AT [facility] OF [address]  [address] [place] [address]  Phone: [phone] Fax: [phone]       Patient wants a call back or thru [facility]: Call back    Comments: calling to request pain medication. Please advise. Thanks.    Please advise patient replies from provider may take up to 48 hours.

## 2024-12-27 DIAGNOSIS — G89.18 ACUTE POST-OPERATIVE PAIN: ICD-10-CM

## 2024-12-27 RX ORDER — OXYCODONE AND ACETAMINOPHEN 5; 325 MG/1; MG/1
1 TABLET ORAL EVERY 4 HOURS PRN
Qty: 28 EACH | Refills: 0 | Status: SHIPPED | OUTPATIENT
Start: 2024-12-27

## 2024-12-27 NOTE — TELEPHONE ENCOUNTER
"Spoke with pt. Pt states his pharmacy never received the pain medication. Advised pt that medication was sent to [facility] pharmacy. Pt states the medication was supposed to go to [facility]. Advised pt I would send a message to Dr Quigley to have it sent to the correct pharmacy. Pt states his nerves are also "waking up" and his wife who is a nurse told him to ask about Gabapentin. Advised pt I would also send this to Dr Quigley. All questions answered. Pt verbalized understanding.   "

## 2024-12-27 NOTE — TELEPHONE ENCOUNTER
----- Message from Kimberlee sent at 12/27/2024 11:11 AM CST -----  Contact: [phone]  Pt states Gabapentin was suppose to be called in for him yesterday but the pharmacy has no record of anything being sent. Can you please call pt to discuss? Thanks      [id number] - [place] - [address] AT [facility] OF [address] [place] [address]  Phone: [phone] Fax: [phone]

## 2025-01-13 ENCOUNTER — TELEPHONE (OUTPATIENT)
Dept: PULMONOLOGY | Facility: CLINIC | Age: 60
End: 2025-01-13
Payer: MEDICARE

## 2025-01-13 DIAGNOSIS — R73.03 BORDERLINE DIABETES: ICD-10-CM

## 2025-01-13 RX ORDER — METFORMIN HYDROCHLORIDE 500 MG/1
TABLET, EXTENDED RELEASE ORAL
Qty: 90 TABLET | Refills: 3 | Status: SHIPPED | OUTPATIENT
Start: 2025-01-13

## 2025-01-13 NOTE — TELEPHONE ENCOUNTER
----- Message from Gilbert sent at 1/13/2025  3:53 PM CST -----  Regarding: return call  Contact: patient  Type:  Patient Returning Call    Who Called:patient  Who Left Message for Patient:nurse  Does the patient know what this is regarding?:a machine MD ordered  Would the patient rather a call back or a response via [facility]?   Best Call Back Number:[phone]  Additional Information: please call

## 2025-01-13 NOTE — TELEPHONE ENCOUNTER
Care Due:                  Date            Visit Type   Department     Provider  --------------------------------------------------------------------------------                                EP -                              PRIMARY      [facility]  Last Visit: 11-      CARE ([facility])   PRIMARY CARE   Otto Bruce  Next Visit: None Scheduled  None         None Found                                                            Last  Test          Frequency    Reason                     Performed    Due Date  --------------------------------------------------------------------------------    HBA1C.......  6 months...  metFORMIN................  04-   10-    [facility] Embedded Care Due Messages. Reference number: [id number].   1/13/2025 11:45:27 AM CST

## 2025-01-13 NOTE — TELEPHONE ENCOUNTER
Refill Routing Note   Medication(s) are not appropriate for processing by [facility] for the following reason(s):        Required labs outdated    [facility] action(s):  Defer   Requires labs : Yes             Appointments  past 12m or future 3m with PCP    Date Provider   Last Visit   11/13/2024 Otto Bruce MD   Next Visit   Visit date not found Otto Bruce MD   ED visits in past 90 days: 0        Note composed:4:03 PM 01/13/2025

## 2025-01-13 NOTE — TELEPHONE ENCOUNTER
Spoke to patient.  I re-faxed the orders to [facility] as well as supplied the patient with their number - [phone].  Pt verbalized understanding.

## 2025-01-14 ENCOUNTER — OFFICE VISIT (OUTPATIENT)
Dept: ORTHOPEDICS | Facility: CLINIC | Age: 60
End: 2025-01-14
Payer: MEDICARE

## 2025-01-14 VITALS
HEART RATE: 68 BPM | SYSTOLIC BLOOD PRESSURE: 122 MMHG | DIASTOLIC BLOOD PRESSURE: 64 MMHG | BODY MASS INDEX: 35.66 KG/M2 | HEIGHT: 70 IN | WEIGHT: 249.13 LBS

## 2025-01-14 DIAGNOSIS — Z96.641 S/P HIP REPLACEMENT, RIGHT: Primary | ICD-10-CM

## 2025-01-14 DIAGNOSIS — Z00.00 ENCOUNTER FOR MEDICARE ANNUAL WELLNESS EXAM: ICD-10-CM

## 2025-01-14 DIAGNOSIS — G89.18 ACUTE POST-OPERATIVE PAIN: ICD-10-CM

## 2025-01-14 PROCEDURE — 3074F SYST BP LT 130 MM HG: CPT | Mod: HCNC,CPTII,S$GLB,

## 2025-01-14 PROCEDURE — 99024 POSTOP FOLLOW-UP VISIT: CPT | Mod: HCNC,S$GLB,,

## 2025-01-14 PROCEDURE — 99999 PR PBB SHADOW E&M-EST. PATIENT-LVL V: CPT | Mod: PBBFAC,HCNC,,

## 2025-01-14 PROCEDURE — 3078F DIAST BP <80 MM HG: CPT | Mod: HCNC,CPTII,S$GLB,

## 2025-01-14 PROCEDURE — 1160F RVW MEDS BY RX/DR IN RCRD: CPT | Mod: HCNC,CPTII,S$GLB,

## 2025-01-14 PROCEDURE — 1159F MED LIST DOCD IN RCRD: CPT | Mod: HCNC,CPTII,S$GLB,

## 2025-01-14 RX ORDER — OXYCODONE AND ACETAMINOPHEN 5; 325 MG/1; MG/1
1 TABLET ORAL EVERY 4 HOURS PRN
Qty: 28 EACH | Refills: 0 | Status: SHIPPED | OUTPATIENT
Start: 2025-01-14 | End: 2025-01-24 | Stop reason: SDUPTHER

## 2025-01-14 RX ORDER — GABAPENTIN 300 MG/1
300 CAPSULE ORAL DAILY
Qty: 60 CAPSULE | Refills: 1 | Status: SHIPPED | OUTPATIENT
Start: 2025-01-14 | End: 2026-01-14

## 2025-01-14 NOTE — PROGRESS NOTES
Post-op Note    HPI    Mina Zelaya is here 6 weeks s/p the following procedure:     DOS: 12/2/2024  Left total hip arthroplasty    Overall doing well. Pain controlled on current regimen. In PT twice weelyy. Denies any chest pain.  Denies any drainage from the incision. Denies any fevers, chills or paresthesias. States he is at his baseline for SOB. He did cut 2 lawns and had some increased pain. Trying to cut back on activity.  Also feeling he is having nerve pain and would like to trial gabapentin.  Also requesting Percocet refill.    DVT Prophylaxis: resumed daily       Physical Exam:     Patient is alert and oriented no acute distress.   Assistive Device: walking stick    Right hip: incision(s) are well healed.  There is no evidence of dehiscence.  There is no induration erythema or signs of infection.  Appropriate soft tissue swelling.  Compartments are soft and compressible.  Warm well-perfused extremity. Flexion 110.     Imaging:     I have personally reviewed the following imaging and these are an interpretation of my findings:     X-Ray: I have reviewed all pertinent results/findings and my personal findings are:  s/p R INA in good alignment without evidence of hardware failure or complication    Assessment    Mina Zelaya is 6 weeks Post-op     Plan:    Overall doing as expected.  We discussed expectations of surgery and postoperative course.     Pain: Continued postoperative pain regimen -- percocet refill sent, gabapentin Rx sent, take 1 at night, do not take with Percocet, if able to tolerate nightly dose may take twice a day.  Avoid muscle relaxers due to diaphragmatic paralysis  DVT prophylaxis: daily asa resumed   PT/OT: cont PT, d/c posterior precautions, discussed overall healing timeline for joint replacement.  Discussed decreasing activity as he is only 6 weeks postop    Follow-up: 6 weeks Habashy  X-rays next visit: left hip 1 view

## 2025-01-17 ENCOUNTER — TELEPHONE (OUTPATIENT)
Dept: PULMONOLOGY | Facility: CLINIC | Age: 60
End: 2025-01-17
Payer: MEDICARE

## 2025-01-17 NOTE — TELEPHONE ENCOUNTER
Called patient regarding orders were [facility]. [facility]     ----- Message from Betsy sent at 1/17/2025 11:51 AM CST -----  Contact: Patient  Type:  Needs Medical Advice    Who Called:  Patient    Would the patient rather a call back or a response via [facility]?   Call back  Best Call Back Number:   [phone]    Additional Information:   States he is following up on the status of his CPAP machine - states no one has contacted him - please call - thank you

## 2025-01-24 ENCOUNTER — TELEPHONE (OUTPATIENT)
Dept: ORTHOPEDICS | Facility: CLINIC | Age: 60
End: 2025-01-24
Payer: MEDICARE

## 2025-01-24 DIAGNOSIS — G89.18 ACUTE POST-OPERATIVE PAIN: ICD-10-CM

## 2025-01-24 DIAGNOSIS — Z96.641 S/P HIP REPLACEMENT, RIGHT: ICD-10-CM

## 2025-01-24 RX ORDER — OXYCODONE AND ACETAMINOPHEN 5; 325 MG/1; MG/1
1 TABLET ORAL EVERY 4 HOURS PRN
Qty: 28 EACH | Refills: 0 | Status: SHIPPED | OUTPATIENT
Start: 2025-01-24 | End: 2025-01-31 | Stop reason: SDUPTHER

## 2025-01-24 NOTE — TELEPHONE ENCOUNTER
Spoke with patient. Pt advise that this would be the last refill since we can not refill after 8 weeks postoperatively. Pt verbalized understanding.

## 2025-01-24 NOTE — TELEPHONE ENCOUNTER
----- Message from Yane Dupree PA-C sent at 1/24/2025 12:09 PM CST -----  Contact: Patient  Perfect, sent.  Please let him know this would be the last refill since we can not refill after 8 weeks postoperatively  ----- Message -----  From: Shivani Cantu LPN  Sent: 1/24/2025  12:03 PM CST  To: Yane Dupree PA-C    Pt said he needs Percocet  ----- Message -----  From: Yane Dupree PA-C  Sent: 1/24/2025  11:52 AM CST  To: MOISES Retana Dr. has been sending in the Norco.  I sent in Percocet for acute postoperative pain.  Does he want a refill of the Percocet or the Norco?  If Norco, this will have to go to Dr. Bruce  ----- Message -----  From: Shivani Cantu LPN  Sent: 1/24/2025  11:47 AM CST  To: Yane Dupree PA-C      ----- Message -----  From: Gabi Marie  Sent: 1/24/2025  11:44 AM CST  To: [facility] Staff    Requesting an RX refill or new RX.    Is this a refill or new RX:     RX name and strength HYDROcodone-acetaminophen (NORCO)  mg per tablet    Is this a 30 day or 90 day RX:     Pharmacy name and phone #    [id number] - [place] - [address] AT [facility] OF [address] [place] [address]  Phone: [phone] Fax: [phone]        The doctors have asked that we provide their patients with the following 2 reminders -- prescription refills can take up to 72 hours, and a friendly reminder that in the future you can use your [facility] account to request refills: yes

## 2025-01-28 ENCOUNTER — EXTERNAL HOME HEALTH (OUTPATIENT)
Dept: HOME HEALTH SERVICES | Facility: HOSPITAL | Age: 60
End: 2025-01-28
Payer: MEDICARE

## 2025-01-29 ENCOUNTER — TELEPHONE (OUTPATIENT)
Dept: PULMONOLOGY | Facility: CLINIC | Age: 60
End: 2025-01-29
Payer: MEDICARE

## 2025-01-29 DIAGNOSIS — Z96.641 S/P HIP REPLACEMENT, RIGHT: ICD-10-CM

## 2025-01-29 DIAGNOSIS — G89.18 ACUTE POST-OPERATIVE PAIN: ICD-10-CM

## 2025-01-29 NOTE — TELEPHONE ENCOUNTER
I faxed orders to [facility] for sleep study. I spoke to patient yesterday. 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  The patient is not being treated for sleep apnea.  We can request the patient to have a sleep study-but that is not well were treating.  He was on a noninvasive ventilator before.  Please notify patient-see if he wants to do a sleep study.  We may have to document everything and do an appeal  ----- Message -----  From: Bessie Lees MA  Sent: 1/28/2025   4:35 PM CST  To: Ruben Leiva MD    Insurance is asking for a sleep study before they approval bipap/ niv. His old test is to old.     Thanks  CINDY Day  ----- Message -----  From: Brianna Verde LPN  Sent: 1/28/2025   8:52 AM CST  To: Bessie Lees MA      ----- Message -----  From: Malathi Toscano  Sent: 1/28/2025   8:02 AM CST  To: Cali DORAN Staff    Type:  Patient Returning Call    Who Called:  PT  Who Left Message for Patient:  Bessie  Does the patient know what this is regarding?:  yes  Best Call Back Number:  [phone]  Additional Information:   PT would like a call back to be provided an update about  his machine from previous convo on 1/17. States he has not heard back from [facility]

## 2025-01-31 ENCOUNTER — TELEPHONE (OUTPATIENT)
Dept: ORTHOPEDICS | Facility: CLINIC | Age: 60
End: 2025-01-31
Payer: MEDICARE

## 2025-01-31 RX ORDER — OXYCODONE AND ACETAMINOPHEN 5; 325 MG/1; MG/1
1 TABLET ORAL EVERY 4 HOURS PRN
Qty: 28 EACH | Refills: 0 | Status: SHIPPED | OUTPATIENT
Start: 2025-01-31

## 2025-01-31 NOTE — TELEPHONE ENCOUNTER
----- Message from Yane Dupree PA-C sent at 1/31/2025  8:13 AM CST -----  Contact: Self/[phone]  Percocet refill sent, please let pt know last refill since we cannot send in past 8 weeks and he is 8 weeks post op  ----- Message -----  From: Shivani Cantu LPN  Sent: 1/30/2025   4:04 PM CST  To: Yane Dupree PA-C      ----- Message -----  From: Trinidad Bassett  Sent: 1/30/2025   4:02 PM CST  To: Joon GREER Staff    Requesting an RX refill or new RX.    Is this a refill or new RX: New    RX name and strength:  oxyCODONE-acetaminophen (PERCOCET) 5-325 mg per tablet    Is this a 30 day or 90 day RX:     Pharmacy name and phone # :  [id number] - [place] - [address] AT [facility] [address]  [address] [address]  Phone: [phone] Fax: [phone]       The doctors have asked that we provide their patients with the following 2 reminders -- prescription refills can take up to 72 hours, and a friendly reminder that in the future you can use your [facility] account to request refills:

## 2025-01-31 NOTE — TELEPHONE ENCOUNTER
Spoke with patient. Pt advise Percocet refill sent, please let pt know last refill since we cannot send in past 8 weeks and he is 8 weeks post op.

## 2025-02-12 ENCOUNTER — TELEPHONE (OUTPATIENT)
Dept: PULMONOLOGY | Facility: CLINIC | Age: 60
End: 2025-02-12
Payer: MEDICARE

## 2025-02-12 NOTE — TELEPHONE ENCOUNTER
----- Message from Sehlley sent at 2/12/2025  8:15 AM CST -----  Regarding: results  Contact: patient  Type:  Test Results    Who Called:  patient  Name of Test (Lab/Mammo/Etc):  sleep study  Date of Test:  02/07/25  Ordering Provider:  Dr. Leiva  Where the test was performed:  home  Best Call Back Number:  [phone]    Additional Information:  Please call patient to advise.  Thanks!

## 2025-02-12 NOTE — TELEPHONE ENCOUNTER
Returned pt call, pt asking about Home Sleep Study results. Explained to pt the provider is in clinic and has not released the results. When provider goes over Sleep study results we will notify him then. Pt v/u.

## 2025-02-17 ENCOUNTER — TELEPHONE (OUTPATIENT)
Dept: PULMONOLOGY | Facility: CLINIC | Age: 60
End: 2025-02-17
Payer: MEDICARE

## 2025-02-18 ENCOUNTER — TELEPHONE (OUTPATIENT)
Dept: PULMONOLOGY | Facility: CLINIC | Age: 60
End: 2025-02-18
Payer: MEDICARE

## 2025-02-18 NOTE — TELEPHONE ENCOUNTER
Spoke to [facility] regarding results, they just got device back from patient. It will take a week to get results. Then we will need to fax results to [facility].

## 2025-02-21 ENCOUNTER — TELEPHONE (OUTPATIENT)
Dept: PULMONOLOGY | Facility: CLINIC | Age: 60
End: 2025-02-21
Payer: MEDICARE

## 2025-02-21 NOTE — TELEPHONE ENCOUNTER
Spoke to patient with results. And let him know I faxed results to [employer]    ----- Message from Presley sent at 2/21/2025  9:17 AM CST -----  Contact: Maria [phone]  Type:  Test ResultsWho Called: Pt wife KrishnaenName of Test (Lab/Mammo/Etc): Sleep studyDate of Test: 2/18Ordering Provider: Jj the test was performed: Home sleep studyWould the patient rather a call back or a response via [facility]? CallBest Call Back Number: [phone] Additional Information:  Maria is inquiring about sleep study results. Pls call back and adv. Thank you.

## 2025-02-26 ENCOUNTER — OFFICE VISIT (OUTPATIENT)
Dept: ORTHOPEDICS | Facility: CLINIC | Age: 60
End: 2025-02-26
Payer: MEDICARE

## 2025-02-26 VITALS
HEART RATE: 89 BPM | DIASTOLIC BLOOD PRESSURE: 59 MMHG | BODY MASS INDEX: 35.6 KG/M2 | HEIGHT: 70 IN | WEIGHT: 248.69 LBS | SYSTOLIC BLOOD PRESSURE: 120 MMHG

## 2025-02-26 DIAGNOSIS — Z96.641 S/P HIP REPLACEMENT, RIGHT: Primary | ICD-10-CM

## 2025-02-26 PROCEDURE — 99999 PR PBB SHADOW E&M-EST. PATIENT-LVL III: CPT | Mod: PBBFAC,,, | Performed by: ORTHOPAEDIC SURGERY

## 2025-02-26 PROCEDURE — 3078F DIAST BP <80 MM HG: CPT | Mod: HCNC,CPTII,S$GLB, | Performed by: ORTHOPAEDIC SURGERY

## 2025-02-26 PROCEDURE — 99024 POSTOP FOLLOW-UP VISIT: CPT | Mod: HCNC,S$GLB,, | Performed by: ORTHOPAEDIC SURGERY

## 2025-02-26 PROCEDURE — 1159F MED LIST DOCD IN RCRD: CPT | Mod: HCNC,CPTII,S$GLB, | Performed by: ORTHOPAEDIC SURGERY

## 2025-02-26 PROCEDURE — 3074F SYST BP LT 130 MM HG: CPT | Mod: HCNC,CPTII,S$GLB, | Performed by: ORTHOPAEDIC SURGERY

## 2025-02-26 NOTE — PROGRESS NOTES
Post-op Note    HPI    Mina Zelaya is here 10 weeks s/p the following procedure:     DOS: 12/2/2024  Right total hip replacement    Overall doing well. Pain controlled on current regimen.  Still doing therapy.  Reports pain 3/10 in his lower back and occasionally in the groin and lateral hip.  He is back to most of his normal activities of daily living.  Walking unassisted.    Physical Exam:     Patient is alert and oriented no acute distress.   Assistive Device:  None    Right hip: incision(s) are well healed.  There is no evidence of dehiscence.  There is no induration erythema or signs of infection.  No significant soft tissue swelling.  Compartments are soft and compressible.  Warm well-perfused extremity. Flexion 120°.  Negative Stinchfield.  Pain with internal rotation past 15°.  External rotation 45°.  Trochanteric tenderness.    Imaging:     I have personally reviewed the following imaging and these are an interpretation of my findings:     X-Ray: I have reviewed all pertinent results/findings and my personal findings are:  s/p R INA in good alignment without evidence of hardware failure or complication    Assessment    Mina Zelaya is 10 weeks Post-op     Plan:    Overall doing well status post right INA.  Continue to progress activities as tolerated.  Continue physical therapy and home exercise program.  No restrictions from my standpoint.  X-rays overall look good.  Follow up in 3 months with repeat x-rays of the bilateral hips

## 2025-02-28 ENCOUNTER — TELEPHONE (OUTPATIENT)
Dept: PULMONOLOGY | Facility: CLINIC | Age: 60
End: 2025-02-28
Payer: MEDICARE

## 2025-02-28 NOTE — TELEPHONE ENCOUNTER
Spoke to pt wife regarding [facility] telling them they have orders but no notes, or sleep study. When we have faxed 3x to [facility], I went ahead an refaxed everything again .  For junie

## 2025-03-11 DIAGNOSIS — G89.29 CHRONIC HIP PAIN, BILATERAL: ICD-10-CM

## 2025-03-11 DIAGNOSIS — M25.552 CHRONIC HIP PAIN, BILATERAL: ICD-10-CM

## 2025-03-11 DIAGNOSIS — M25.551 CHRONIC HIP PAIN, BILATERAL: ICD-10-CM

## 2025-03-11 DIAGNOSIS — M51.369 DDD (DEGENERATIVE DISC DISEASE), LUMBAR: ICD-10-CM

## 2025-03-11 DIAGNOSIS — M77.01 MEDIAL EPICONDYLITIS OF RIGHT ELBOW: ICD-10-CM

## 2025-03-11 RX ORDER — HYDROCODONE BITARTRATE AND ACETAMINOPHEN 10; 325 MG/1; MG/1
1 TABLET ORAL EVERY 8 HOURS PRN
Qty: 90 TABLET | Refills: 0 | OUTPATIENT
Start: 2025-03-11

## 2025-03-11 NOTE — TELEPHONE ENCOUNTER
----- Message from Shelley sent at 3/11/2025  1:28 PM CDT -----  Contact: [phone]  Requesting an RX refill or new RX.Is this a refill or new RX: refillRX name and strength  HYDROcodone-acetaminophen (NORCO)  mg per tablet 90 tabletIs this a 30 day or 90 day RX: 90   Yes, quantity medically necessaryPharmacy name and phone #[id number] - [place] - [address] AT [facility] [address]: [phone]: [phone] doctors have asked that we provide their patients with the following 2 reminders -- prescription refills can take up to 72 hours, and a friendly reminder that in the future you can use your [facility] account to request refills: yes

## 2025-03-11 NOTE — TELEPHONE ENCOUNTER
Care Due:                  Date            Visit Type   Department     Provider  --------------------------------------------------------------------------------                                EP -                              PRIMARY      [facility]  Last Visit: 11-      CARE ([facility])   PRIMARY CARE   Otto Bruce  Next Visit: None Scheduled  None         None Found                                                            Last  Test          Frequency    Reason                     Performed    Due Date  --------------------------------------------------------------------------------    CMP.........  12 months..  bumetanide, rosuvastatin.  04- 04-    Lipid Panel.  12 months..  rosuvastatin.............  04- 04-    [employer] Embedded Care Due Messages. Reference number: [id number].   3/11/2025 1:35:15 PM CDT

## 2025-03-13 ENCOUNTER — TELEPHONE (OUTPATIENT)
Dept: PULMONOLOGY | Facility: CLINIC | Age: 60
End: 2025-03-13
Payer: MEDICARE

## 2025-03-13 NOTE — TELEPHONE ENCOUNTER
----- Message from EXTRABANCA sent at 3/13/2025  8:18 AM CDT -----  Regarding: Pt Advice  Contact: [phone]  [facility] calling regarding additional information regarding pts equipment. Bipap -Copd must be ruled out as not contributed to pt Pulm limitation. Needs valid testing pft, nif, abg or faith. Needs oxygen sat test if pt completed. Pt will have to have neuromuscular diseases or severe thoracic cage abnormality. Please call [phone]

## 2025-03-13 NOTE — TELEPHONE ENCOUNTER
Please see attached message from [employer].  Patient needs further testing.      Bipap -Copd must be ruled out as not contributed to pt Pulm limitation. Needs valid testing pft, nif, abg or faith. Needs oxygen sat test if pt completed.

## 2025-03-14 ENCOUNTER — TELEPHONE (OUTPATIENT)
Dept: PULMONOLOGY | Facility: CLINIC | Age: 60
End: 2025-03-14
Payer: MEDICARE

## 2025-03-14 NOTE — TELEPHONE ENCOUNTER
----- Message from Shelley sent at 3/14/2025  9:42 AM CDT -----  Contact: Mina Arredondo is calling in regards to getting a machine. [facility].please contact [facility] [phone].please call pt back at [phone]

## 2025-03-14 NOTE — TELEPHONE ENCOUNTER
----- Message from Bia sent at 3/14/2025  9:02 AM CDT -----  Type:  Needs Medical AdviceWho Called: Hubert with [facility] Symptoms (please be specific): How long has patient had these symptoms: Pharmacy name and phone #: Would the patient rather a call back or a response via [facility]?call back Best Call Back Number: [phone] fax [phone] Information: Jose Raul called in this morning checking on the status of fax requesting additional information for the patient bpap machine

## 2025-03-14 NOTE — TELEPHONE ENCOUNTER
Returned call to Hubert at [facility] f/u on a message sent yesterday requesting clinical notes or additional testing for noninvasive ventilator/BiPAP device. Explained to Hubert a letter was written by provider with information needed, provider is not in today, will be back Monday, letter in providers box for signature and will be faxed to [facility] when signed. Hubert v/u.    [facility] p [phone] fax [phone]

## 2025-03-28 ENCOUNTER — TELEPHONE (OUTPATIENT)
Dept: PULMONOLOGY | Facility: CLINIC | Age: 60
End: 2025-03-28
Payer: MEDICARE

## 2025-03-28 NOTE — TELEPHONE ENCOUNTER
The patient has pulmonary functions available on epic from the [facility] done July 16, 2015 showing forced vital capacity of 40% and an FEV1 of 39% at 1 point 4 L. the FEV1 to FVC ratio was 80%.  There was no measurable airflow obstruction.  There was no measurable COPD.     The patient has had supine and erect vital capacity measurements in the past that are not available.  These studies did show evidence for diaphragm paralysis.  The patient has also been evaluated by other techniques to demonstrate diaphragm paralysis.     The patient has an established diagnosis of bilateral diaphragm paralysis.  He has no COPD.     The patient needs a noninvasive ventilator/BiPAP device.     I would request being informed as any specific test that needs to be ordered to arrange this device.

## 2025-03-28 NOTE — TELEPHONE ENCOUNTER
Jose Raul is saying the letter that was done for the bipap must be done as an addendum to his progress note in order for insurance approval.  Has to show in the F2F visit.

## 2025-03-28 NOTE — TELEPHONE ENCOUNTER
----- Message from Aretha sent at 3/28/2025 11:09 AM CDT -----  Regarding: Needs Medical Advice  Contact: [facility] at [phone]  Type: Needs Medical AdviceWho Called:  [facility] at [phone] Information: Wants to talk about patients by pap machine and the letter Dr. Leiva sent over. Please call and advise. Thank you.

## 2025-03-31 ENCOUNTER — TELEPHONE (OUTPATIENT)
Dept: PULMONOLOGY | Facility: CLINIC | Age: 60
End: 2025-03-31
Payer: MEDICARE

## 2025-03-31 NOTE — TELEPHONE ENCOUNTER
----- Message from Shelley sent at 3/31/2025  3:17 PM CDT -----  Contact: [facility]/Ángel Garg is calling in regards to update on 03/28.please call [facility] back at [phone] fax [phone]

## 2025-04-01 ENCOUNTER — TELEPHONE (OUTPATIENT)
Dept: PULMONOLOGY | Facility: CLINIC | Age: 60
End: 2025-04-01
Payer: MEDICARE

## 2025-04-01 NOTE — TELEPHONE ENCOUNTER
Spoke to archie with [facility].  Told him addended note was faxed on 03/28/25.  We did receive confirmation but yet he claims didn't receive.  So resent again and told him that I would need a f/u confirmation from him that he received.  He verbalized understanding.

## 2025-04-01 NOTE — TELEPHONE ENCOUNTER
----- Message from Bia sent at 4/1/2025 10:29 AM CDT -----  Type:  Patient Returning CallWho Called:[facility] with [facility] Left Message for Patient:Yoanna the patient know what this is regarding?:bpapWould the patient rather a call back or a response via [facility]? Call back Best Call Back Number:[phone] Additional Information:

## 2025-04-14 ENCOUNTER — TELEPHONE (OUTPATIENT)
Dept: PULMONOLOGY | Facility: CLINIC | Age: 60
End: 2025-04-14
Payer: MEDICARE

## 2025-04-14 DIAGNOSIS — R09.89 CHRONIC SINUS COMPLAINTS: ICD-10-CM

## 2025-04-14 DIAGNOSIS — J98.6 DIAPHRAGM PARALYSIS: ICD-10-CM

## 2025-04-14 DIAGNOSIS — J96.12 CHRONIC RESPIRATORY FAILURE WITH HYPERCAPNIA: Primary | ICD-10-CM

## 2025-04-14 DIAGNOSIS — E87.6 HYPOKALEMIA: ICD-10-CM

## 2025-04-14 DIAGNOSIS — J44.9 COPD MIXED TYPE: ICD-10-CM

## 2025-04-14 DIAGNOSIS — J45.40 MODERATE PERSISTENT ASTHMA, UNCOMPLICATED: ICD-10-CM

## 2025-04-14 RX ORDER — POTASSIUM CHLORIDE 20 MEQ/1
40 TABLET, EXTENDED RELEASE ORAL 2 TIMES DAILY
Qty: 360 TABLET | Refills: 0 | Status: SHIPPED | OUTPATIENT
Start: 2025-04-14

## 2025-04-14 RX ORDER — MONTELUKAST SODIUM 10 MG/1
10 TABLET ORAL NIGHTLY
Qty: 90 TABLET | Refills: 2 | Status: SHIPPED | OUTPATIENT
Start: 2025-04-14

## 2025-04-14 NOTE — TELEPHONE ENCOUNTER
----- Message from Stephanie sent at 4/14/2025  9:05 AM CDT -----  Contact: self  Type:  Needs Medical AdviceWho Called:  PtSymptoms (please be specific):  C-pap machine settings causing to feel like he can't breathBest Call Back Number:  [phone] Information: Pt states he called [employer] and they informed him Dr. Leiva has to approve the setting change.  Please call to advise... Thank you...

## 2025-04-15 NOTE — TELEPHONE ENCOUNTER
Refill Decision Note   Mina Zelaya  is requesting a refill authorization.  Brief Assessment and Rationale for Refill:  Approve     Medication Therapy Plan:        Pharmacist review requested: Yes   Extended chart review required: Yes   Comments:     Note composed:8:55 PM 04/14/2025

## 2025-04-15 NOTE — TELEPHONE ENCOUNTER
Refill Decision Note   Mina Zelaya  is requesting a refill authorization.  Brief Assessment and Rationale for Refill:  Approve     Medication Therapy Plan:         Comments:     Note composed:7:47 PM 04/14/2025             Appointments     Last Visit   11/13/2024 Otto Bruce MD   Next Visit   4/29/2025 Otto Bruce MD

## 2025-04-15 NOTE — TELEPHONE ENCOUNTER
Refill Routing Note   Medication(s) are not appropriate for processing by [facility] for the following reason(s):        Drug-drug interaction: potassium chloride SA and Ulcerative colitis     [facility] action(s):  Defer             Pharmacist review requested: Yes     Appointments  past 12m or future 3m with PCP    Date Provider   Last Visit   11/13/2024 Otto Bruce MD   Next Visit   4/14/2025 Otto Bruce MD   ED visits in past 90 days: 0        Note composed:7:46 PM 04/14/2025

## 2025-04-23 ENCOUNTER — PATIENT MESSAGE (OUTPATIENT)
Dept: PULMONOLOGY | Facility: CLINIC | Age: 60
End: 2025-04-23
Payer: MEDICARE

## 2025-04-25 ENCOUNTER — TELEPHONE (OUTPATIENT)
Dept: ORTHOPEDICS | Facility: CLINIC | Age: 60
End: 2025-04-25
Payer: MEDICARE

## 2025-04-29 ENCOUNTER — OFFICE VISIT (OUTPATIENT)
Dept: PRIMARY CARE CLINIC | Facility: CLINIC | Age: 60
End: 2025-04-29
Payer: MEDICARE

## 2025-04-29 VITALS
WEIGHT: 260.5 LBS | SYSTOLIC BLOOD PRESSURE: 130 MMHG | DIASTOLIC BLOOD PRESSURE: 60 MMHG | TEMPERATURE: 98 F | BODY MASS INDEX: 37.29 KG/M2 | RESPIRATION RATE: 18 BRPM | HEART RATE: 79 BPM | OXYGEN SATURATION: 97 % | HEIGHT: 70 IN

## 2025-04-29 DIAGNOSIS — E78.5 HYPERLIPIDEMIA, UNSPECIFIED HYPERLIPIDEMIA TYPE: ICD-10-CM

## 2025-04-29 DIAGNOSIS — E66.01 SEVERE OBESITY (BMI 35.0-39.9) WITH COMORBIDITY: ICD-10-CM

## 2025-04-29 DIAGNOSIS — N52.9 ERECTILE DYSFUNCTION, UNSPECIFIED ERECTILE DYSFUNCTION TYPE: ICD-10-CM

## 2025-04-29 DIAGNOSIS — J96.11 CHRONIC RESPIRATORY FAILURE WITH HYPOXIA: ICD-10-CM

## 2025-04-29 DIAGNOSIS — Z12.5 PROSTATE CANCER SCREENING: ICD-10-CM

## 2025-04-29 DIAGNOSIS — K51.00 ULCERATIVE PANCOLITIS WITHOUT COMPLICATION: ICD-10-CM

## 2025-04-29 DIAGNOSIS — M51.369 DDD (DEGENERATIVE DISC DISEASE), LUMBAR: ICD-10-CM

## 2025-04-29 DIAGNOSIS — J44.9 COPD MIXED TYPE: ICD-10-CM

## 2025-04-29 DIAGNOSIS — M25.551 CHRONIC HIP PAIN, BILATERAL: ICD-10-CM

## 2025-04-29 DIAGNOSIS — G89.29 CHRONIC HIP PAIN, BILATERAL: ICD-10-CM

## 2025-04-29 DIAGNOSIS — Z00.00 ANNUAL PHYSICAL EXAM: Primary | ICD-10-CM

## 2025-04-29 DIAGNOSIS — R73.03 BORDERLINE DIABETES: ICD-10-CM

## 2025-04-29 DIAGNOSIS — M87.051 AVASCULAR NECROSIS OF BONE OF HIP, RIGHT: ICD-10-CM

## 2025-04-29 DIAGNOSIS — M25.552 CHRONIC HIP PAIN, BILATERAL: ICD-10-CM

## 2025-04-29 PROCEDURE — 99999 PR PBB SHADOW E&M-EST. PATIENT-LVL V: CPT | Mod: PBBFAC,HCNC,, | Performed by: FAMILY MEDICINE

## 2025-04-29 PROCEDURE — 3075F SYST BP GE 130 - 139MM HG: CPT | Mod: CPTII,HCNC,S$GLB, | Performed by: FAMILY MEDICINE

## 2025-04-29 PROCEDURE — 3078F DIAST BP <80 MM HG: CPT | Mod: CPTII,HCNC,S$GLB, | Performed by: FAMILY MEDICINE

## 2025-04-29 PROCEDURE — 1160F RVW MEDS BY RX/DR IN RCRD: CPT | Mod: CPTII,HCNC,S$GLB, | Performed by: FAMILY MEDICINE

## 2025-04-29 PROCEDURE — 99396 PREV VISIT EST AGE 40-64: CPT | Mod: HCNC,S$GLB,, | Performed by: FAMILY MEDICINE

## 2025-04-29 PROCEDURE — 3008F BODY MASS INDEX DOCD: CPT | Mod: CPTII,HCNC,S$GLB, | Performed by: FAMILY MEDICINE

## 2025-04-29 PROCEDURE — 1159F MED LIST DOCD IN RCRD: CPT | Mod: CPTII,HCNC,S$GLB, | Performed by: FAMILY MEDICINE

## 2025-04-29 RX ORDER — HYDROCODONE BITARTRATE AND ACETAMINOPHEN 10; 325 MG/1; MG/1
1 TABLET ORAL EVERY 8 HOURS PRN
Qty: 90 TABLET | Refills: 0 | Status: SHIPPED | OUTPATIENT
Start: 2025-04-29

## 2025-04-29 RX ORDER — TADALAFIL 20 MG/1
20 TABLET ORAL DAILY PRN
Qty: 20 TABLET | Refills: 11 | Status: SHIPPED | OUTPATIENT
Start: 2025-04-29

## 2025-04-29 NOTE — PROGRESS NOTES
Assessment:       1. Annual physical exam    2. Severe obesity (BMI 35.0-39.9) with comorbidity    3. COPD mixed type    4. Ulcerative pancolitis without complication    5. Avascular necrosis of bone of hip, right    6. Chronic respiratory failure with hypoxia    7. Prostate cancer screening    8. Hyperlipidemia, unspecified hyperlipidemia type    9. Borderline diabetes    10. Erectile dysfunction, unspecified erectile dysfunction type    11. Chronic hip pain, bilateral    12. DDD (degenerative disc disease), lumbar         Plan:       Annual physical exam  -     CBC Auto Differential; Future; Expected date: 04/29/2025  -     Comprehensive Metabolic Panel; Future; Expected date: 04/29/2025  -     Lipid Panel; Future; Expected date: 04/29/2025  -     Hemoglobin A1C; Future; Expected date: 04/29/2025  -     PSA, Screening; Future; Expected date: 04/29/2025    Severe obesity (BMI 35.0-39.9) with comorbidity    COPD mixed type    Ulcerative pancolitis without complication    Avascular necrosis of bone of hip, right    Chronic respiratory failure with hypoxia    Prostate cancer screening  -     PSA, Screening; Future; Expected date: 04/29/2025    Hyperlipidemia, unspecified hyperlipidemia type  -     CBC Auto Differential; Future; Expected date: 04/29/2025  -     Comprehensive Metabolic Panel; Future; Expected date: 04/29/2025  -     Lipid Panel; Future; Expected date: 04/29/2025    Borderline diabetes  -     Hemoglobin A1C; Future; Expected date: 04/29/2025    Erectile dysfunction, unspecified erectile dysfunction type  -     tadalafiL (CIALIS) 20 MG Tab; Take 1 tablet (20 mg total) by mouth daily as needed for Erectile Dysfunction.  Dispense: 20 tablet; Refill: 11    Chronic hip pain, bilateral  -     HYDROcodone-acetaminophen (NORCO)  mg per tablet; Take 1 tablet by mouth every 8 (eight) hours as needed for Pain.  Dispense: 90 tablet; Refill: 0    DDD (degenerative disc disease), lumbar  -      HYDROcodone-acetaminophen (NORCO)  mg per tablet; Take 1 tablet by mouth every 8 (eight) hours as needed for Pain.  Dispense: 90 tablet; Refill: 0      Assessment & Plan    - Assessed prediabetes, confirming no progression to diabetes.  - Evaluated hip status post-replacement surgery from last summer.  - Considered reports of breathing changes and ongoing hip/back pain.  - Reviewed sleep issues related to BIPAP machine difficulties.  - Assessed fluid retention in lower extremities.    LOW BACK PAIN:   Patient reports lower back pain.   Prescribed ibuprofen 800mg for pain management.   Continued hydrocodone to be used sparingly (half tablet as needed, up to 2.5 tablets for increased pain).   Confirmed patient is no longer taking oxycodone.    RIGHT HIP REPLACEMENT:   Patient had right hip replacement surgery last summer and reports initial improvement followed by decline in hip condition.    LEFT HIP PAIN AND OSTEOLYSIS:   Orthopedist is considering left hip replacement due to reduced blood flow and gray-appearing bone, indicating possible osteolysis in left thigh.    OBSTRUCTIVE SLEEP APNEA:   Patient reports not sleeping well for 5 months with slight change in breathing.   Determined patient needs to consult a physician to adjust BiPAP settings.   Prescribed a new BiPAP machine.    OVERWEIGHT:   Patient acknowledges need for weight reduction.   Patient has initiated exercise regimen at the gym, utilizing treadmill for 45 minutes daily.   Recommend continuing this exercise routine and reducing carbohydrate intake for weight management.    VENOUS INSUFFICIENCY AND EDEMA:   Patient reports fluid retention in legs after prolonged standing.   Recommend elevating feet to reduce edema.    OTHER:   Increased Cialis dosage from 10mg to 20mg.   Ordered labs to be completed today.    FOLLOW-UP:   Scheduled follow-up with orthopedist in May.       Medication List with Changes/Refills   Current Medications    ALBUTEROL  (PROVENTIL/VENTOLIN HFA) 90 MCG/ACTUATION INHALER    2 puffs every 4 hours as needed for cough, wheeze, or shortness of breath    ALBUTEROL-IPRATROPIUM (DUO-NEB) 2.5 MG-0.5 MG/3 ML NEBULIZER SOLUTION    Take 3 mLs by nebulization every 6 (six) hours as needed for Wheezing or Shortness of Breath. Rescue    ALLOPURINOL (ZYLOPRIM) 100 MG TABLET    TAKE 1 TABLET EVERY DAY    ALLOPURINOL (ZYLOPRIM) 300 MG TABLET    TAKE 1 TABLET EVERY DAY    ASCORBIC ACID, VITAMIN C, (VITAMIN C) 1000 MG TABLET    Take 1,000 mg by mouth once daily.    ASPIRIN (ECOTRIN) 81 MG EC TABLET    Take 81 mg by mouth once daily.    ASPIRIN (ECOTRIN) 81 MG EC TABLET    Take 1 tablet (81 mg total) by mouth 2 (two) times a day.    BUMETANIDE (BUMEX) 2 MG TABLET    Take 1 tablet (2 mg total) by mouth once daily.    CALCIUM CARBONATE (OS-VÍCTOR) 600 MG (1,500 MG) TAB    Take 600 mg by mouth once daily.    CHLORHEXIDINE (HIBICLENS) 4 % EXTERNAL LIQUID    WASH DAILY FOR 3 DAYS THEN 3 DAYS PER WEEK FOR 2 WEEKS- REPEAT AS NEEDED    CLINDAMYCIN (CLEOCIN T) 1 % EXTERNAL SOLUTION    Apply topically 2 (two) times daily.    CYANOCOBALAMIN (VITAMIN B-12) 100 MCG TABLET    Take 100 mcg by mouth once daily.    FISH OIL-OMEGA-3 FATTY ACIDS 300-1,000 MG CAPSULE    Take 1,200 mg by mouth once daily.    GABAPENTIN (NEURONTIN) 300 MG CAPSULE    Take 1 capsule (300 mg total) by mouth once daily.    IBUPROFEN (ADVIL,MOTRIN) 600 MG TABLET    Take 1 tablet (600 mg total) by mouth 3 (three) times daily.    ITRACONAZOLE (SPORANOX) 100 MG CAP    TAKE 2 CAPSULES BY MOUTH 2 TIMES A WEEK DURING FLARE FOR 2 WEEKS    KETOCONAZOLE (NIZORAL) 2 % SHAMPOO    Apply topically 3 (three) times a week.    LORATADINE (CLARITIN) 10 MG TABLET    Take 10 mg by mouth once daily.    METFORMIN (GLUCOPHAGE-XR) 500 MG ER 24HR TABLET    TAKE 1 TABLET EVERY MORNING WITH BREAKFAST    MONTELUKAST (SINGULAIR) 10 MG TABLET    TAKE 1 TABLET EVERY EVENING    NEOMYCIN-POLYMYXIN-HYDROCORTISONE (CORTISPORIN)  3.5-10,000-1 MG/ML-UNIT/ML-% OTIC SUSPENSION    Place 3 drops into the left ear 3 (three) times daily.    POTASSIUM CHLORIDE SA (K-DUR,KLOR-CON) 20 MEQ TABLET    TAKE 2 TABLETS TWICE A DAY    PREDNISONE (DELTASONE) 20 MG TABLET        ROSUVASTATIN (CRESTOR) 5 MG TABLET    TAKE 1 TABLET EVERY DAY    STIOLTO RESPIMAT 2.5-2.5 MCG/ACTUATION MIST    INHALE 2 PUFFS INTO THE LUNGS EVERY DAY    TRAZODONE (DESYREL) 100 MG TABLET    Take 1 tablet (100 mg total) by mouth every evening. At bedtime   Changed and/or Refilled Medications    Modified Medication Previous Medication    HYDROCODONE-ACETAMINOPHEN (NORCO)  MG PER TABLET HYDROcodone-acetaminophen (NORCO)  mg per tablet       Take 1 tablet by mouth every 8 (eight) hours as needed for Pain.    Take 1 tablet by mouth every 8 (eight) hours as needed for Pain.    TADALAFIL (CIALIS) 20 MG TAB tadalafiL (CIALIS) 10 MG tablet       Take 1 tablet (20 mg total) by mouth daily as needed for Erectile Dysfunction.    Take 1 tablet (10 mg total) by mouth daily as needed for Erectile Dysfunction.   Discontinued Medications    OXYCODONE-ACETAMINOPHEN (PERCOCET) 5-325 MG PER TABLET    Take 1 tablet by mouth every 4 (four) hours as needed for Pain.         Subjective:    Patient ID: Mina Zelaya is a 59 y.o. male.  Chief Complaint: Annual Exam    HPI  History of Present Illness    CHIEF COMPLAINT:  Patient presents today for medication follow-up.    SLEEP:  He reports not having slept through the night in five months. He is experiencing issues with his BiPAP machine from new medical equipment company, noting it is not functioning properly and requires adjustment.    MUSCULOSKELETAL:  He reports pain in lower back and right hip. He underwent right hip replacement surgery last summer. He is being considered for left hip replacement due to compromised blood flow resulting in grayish bone appearance. He manages pain with ibuprofen 800mg as needed.    DIET AND EXERCISE:  He  "started exercising at the gym three weeks ago, performing 45 minutes of treadmill daily. He reports craving and consuming excessive amounts of sweets, particularly chocolates, acknowledging his diet is not optimal for weight management. This morning, he had only consumed coffee with a small amount of milk prior to the appointment.    EDEMA:  He reports fluid retention, particularly noticeable with prolonged standing.      ROS:  Constitutional: -fevers, -chills, +sleep disturbances, +difficulty falling asleep, +sleep difficulty, +difficulty staying asleep  Musculoskeletal: +back pain, +limb pain, +muscle cramps  Endocrine: +increased appetite, +unusual cravings       Review of Systems    Objective:      Vitals:    04/29/25 0900   BP: 130/60   BP Location: Left arm   Patient Position: Sitting   Pulse: 79   Resp: 18   Temp: 97.8 °F (36.6 °C)   TempSrc: Temporal   SpO2: 97%   Weight: 118.2 kg (260 lb 7.6 oz)   Height: 5' 10" (1.778 m)     BP Readings from Last 5 Encounters:   04/29/25 130/60   02/26/25 (!) 120/59   01/14/25 122/64   12/17/24 (!) 116/56   12/02/24 (!) 149/72     Wt Readings from Last 5 Encounters:   04/29/25 118.2 kg (260 lb 7.6 oz)   02/26/25 112.8 kg (248 lb 10.9 oz)   01/14/25 113 kg (249 lb 1.9 oz)   12/23/24 113 kg (249 lb 1.9 oz)   12/17/24 113 kg (249 lb 1.9 oz)     Physical Exam  Physical Exam    General: Well-developed. Well-nourished. No acute distress.  Eyes: EOMI. Sclerae anicteric.  HENT: Normocephalic. Atraumatic. Nares patent. Moist oral mucosa.  Cardiovascular: Regular rate. Regular rhythm. No murmurs. No rubs. No gallops. Normal S1, S2.  Respiratory: Normal respiratory effort. Clear to auscultation bilaterally. No rales. No rhonchi. No wheezing.  Musculoskeletal: No  obvious deformity.  Extremities: No lower extremity edema.  Neurological: Alert & oriented x3. No slurred speech. Normal gait.  Psychiatric: Normal mood. Normal affect. Good insight. Good judgment.  Skin: Warm. Dry. No rash. "         Lab Results   Component Value Date    WBC 6.17 04/29/2025    HGB 13.1 (L) 04/29/2025    HCT 43.0 04/29/2025     04/29/2025    CHOL 179 04/29/2025    TRIG 41 04/29/2025    HDL 66 04/29/2025    ALT 22 04/26/2024    AST 31 04/26/2024     04/26/2024    K 4.1 04/26/2024     04/26/2024    CREATININE 0.8 04/26/2024    BUN 19 04/26/2024    CO2 25 04/26/2024    TSH 1.452 11/28/2022    PSA 1.0 04/26/2024    INR 1.0 07/14/2020    HGBA1C 5.8 (H) 04/26/2024      This note was generated with the assistance of ambient listening technology. Verbal consent was obtained by the patient and accompanying visitor(s) for the recording of patient appointment to facilitate this note. I attest to having reviewed and edited the generated note for accuracy, though some syntax or spelling errors may persist. Please contact the author of this note for any clarification.

## 2025-04-30 ENCOUNTER — RESULTS FOLLOW-UP (OUTPATIENT)
Dept: PRIMARY CARE CLINIC | Facility: CLINIC | Age: 60
End: 2025-04-30

## 2025-05-07 ENCOUNTER — DOCUMENT SCAN (OUTPATIENT)
Dept: HOME HEALTH SERVICES | Facility: HOSPITAL | Age: 60
End: 2025-05-07
Payer: MEDICARE

## 2025-05-26 ENCOUNTER — OFFICE VISIT (OUTPATIENT)
Dept: PRIMARY CARE CLINIC | Facility: CLINIC | Age: 60
End: 2025-05-26
Payer: MEDICARE

## 2025-05-26 VITALS
TEMPERATURE: 98 F | HEART RATE: 77 BPM | WEIGHT: 255.31 LBS | BODY MASS INDEX: 36.55 KG/M2 | SYSTOLIC BLOOD PRESSURE: 126 MMHG | DIASTOLIC BLOOD PRESSURE: 60 MMHG | RESPIRATION RATE: 18 BRPM | OXYGEN SATURATION: 95 % | HEIGHT: 70 IN

## 2025-05-26 DIAGNOSIS — J01.00 ACUTE MAXILLARY SINUSITIS, RECURRENCE NOT SPECIFIED: Primary | ICD-10-CM

## 2025-05-26 PROCEDURE — 1159F MED LIST DOCD IN RCRD: CPT | Mod: CPTII,HCNC,S$GLB, | Performed by: FAMILY MEDICINE

## 2025-05-26 PROCEDURE — 3074F SYST BP LT 130 MM HG: CPT | Mod: CPTII,HCNC,S$GLB, | Performed by: FAMILY MEDICINE

## 2025-05-26 PROCEDURE — 3008F BODY MASS INDEX DOCD: CPT | Mod: CPTII,HCNC,S$GLB, | Performed by: FAMILY MEDICINE

## 2025-05-26 PROCEDURE — 1160F RVW MEDS BY RX/DR IN RCRD: CPT | Mod: CPTII,HCNC,S$GLB, | Performed by: FAMILY MEDICINE

## 2025-05-26 PROCEDURE — 96372 THER/PROPH/DIAG INJ SC/IM: CPT | Mod: HCNC,S$GLB,, | Performed by: FAMILY MEDICINE

## 2025-05-26 PROCEDURE — 99214 OFFICE O/P EST MOD 30 MIN: CPT | Mod: HCNC,25,S$GLB, | Performed by: FAMILY MEDICINE

## 2025-05-26 PROCEDURE — 99999 PR PBB SHADOW E&M-EST. PATIENT-LVL V: CPT | Mod: PBBFAC,HCNC,, | Performed by: FAMILY MEDICINE

## 2025-05-26 PROCEDURE — 3078F DIAST BP <80 MM HG: CPT | Mod: CPTII,HCNC,S$GLB, | Performed by: FAMILY MEDICINE

## 2025-05-26 PROCEDURE — 3044F HG A1C LEVEL LT 7.0%: CPT | Mod: CPTII,HCNC,S$GLB, | Performed by: FAMILY MEDICINE

## 2025-05-26 RX ORDER — BETAMETHASONE SODIUM PHOSPHATE AND BETAMETHASONE ACETATE 3; 3 MG/ML; MG/ML
6 INJECTION, SUSPENSION INTRA-ARTICULAR; INTRALESIONAL; INTRAMUSCULAR; SOFT TISSUE
Status: COMPLETED | OUTPATIENT
Start: 2025-05-26 | End: 2025-05-26

## 2025-05-26 RX ORDER — AMOXICILLIN AND CLAVULANATE POTASSIUM 875; 125 MG/1; MG/1
1 TABLET, FILM COATED ORAL 2 TIMES DAILY
Qty: 14 TABLET | Refills: 0 | Status: SHIPPED | OUTPATIENT
Start: 2025-05-26 | End: 2025-06-02

## 2025-05-26 RX ADMIN — BETAMETHASONE SODIUM PHOSPHATE AND BETAMETHASONE ACETATE 6 MG: 3; 3 INJECTION, SUSPENSION INTRA-ARTICULAR; INTRALESIONAL; INTRAMUSCULAR; SOFT TISSUE at 11:05

## 2025-05-26 NOTE — PROGRESS NOTES
Verified pt ID using name and . Lauren Reyes, LPN. Administered Celestone in right dorsalgluteal per physician order using aseptic technique. Pt tolerated well with no adverse reactions noted.

## 2025-05-26 NOTE — PROGRESS NOTES
Assessment:       1. Acute maxillary sinusitis, recurrence not specified         Plan:       Acute maxillary sinusitis, recurrence not specified  -     betamethasone acetate-betamethasone sodium phosphate injection 6 mg  -     amoxicillin-clavulanate 875-125mg (AUGMENTIN) 875-125 mg per tablet; Take 1 tablet by mouth 2 (two) times daily. for 7 days  Dispense: 14 tablet; Refill: 0      Assessment & Plan    - Diagnosed sinusitis based on symptoms and exam.    RIGHT EYE PAIN:   Patient reports headache and redness in the right eye without fever.   Started Celebrex for pain relief.    LEFT EAR FLUID:   Noted fluid behind the left ear.   Prescribed Augmentin (antibiotic) for 1 week.   Advised patient to take with food to prevent GI side effects.    NASAL CONGESTION AND SINUSITIS:   Patient reports nasal burning and pressure in the sinuses.   Prescribed Augmentin for 1 week.    ACUTE COUGH:   Patient reports coughing more than usual.    GENERAL:   Administered injection during visit.       Medication List with Changes/Refills   New Medications    AMOXICILLIN-CLAVULANATE 875-125MG (AUGMENTIN) 875-125 MG PER TABLET    Take 1 tablet by mouth 2 (two) times daily. for 7 days   Current Medications    ALBUTEROL (PROVENTIL/VENTOLIN HFA) 90 MCG/ACTUATION INHALER    2 puffs every 4 hours as needed for cough, wheeze, or shortness of breath    ALBUTEROL-IPRATROPIUM (DUO-NEB) 2.5 MG-0.5 MG/3 ML NEBULIZER SOLUTION    Take 3 mLs by nebulization every 6 (six) hours as needed for Wheezing or Shortness of Breath. Rescue    ALLOPURINOL (ZYLOPRIM) 100 MG TABLET    TAKE 1 TABLET EVERY DAY    ALLOPURINOL (ZYLOPRIM) 300 MG TABLET    TAKE 1 TABLET EVERY DAY    ASCORBIC ACID, VITAMIN C, (VITAMIN C) 1000 MG TABLET    Take 1,000 mg by mouth once daily.    ASPIRIN (ECOTRIN) 81 MG EC TABLET    Take 1 tablet (81 mg total) by mouth 2 (two) times a day.    BUMETANIDE (BUMEX) 2 MG TABLET    Take 1 tablet (2 mg total) by mouth once daily.    CALCIUM  CARBONATE (OS-VÍCTOR) 600 MG (1,500 MG) TAB    Take 600 mg by mouth once daily.    CYANOCOBALAMIN (VITAMIN B-12) 100 MCG TABLET    Take 100 mcg by mouth once daily.    FISH OIL-OMEGA-3 FATTY ACIDS 300-1,000 MG CAPSULE    Take 1,200 mg by mouth once daily.    HYDROCODONE-ACETAMINOPHEN (NORCO)  MG PER TABLET    Take 1 tablet by mouth every 8 (eight) hours as needed for Pain.    IBUPROFEN (ADVIL,MOTRIN) 600 MG TABLET    Take 1 tablet (600 mg total) by mouth 3 (three) times daily.    LACTOBACILLUS ACIDOPHILUS 10 BILLION CELL CAP    Take 1 capsule by mouth.    LORATADINE (CLARITIN) 10 MG TABLET    Take 10 mg by mouth once daily.    METFORMIN (GLUCOPHAGE-XR) 500 MG ER 24HR TABLET    TAKE 1 TABLET EVERY MORNING WITH BREAKFAST    MONTELUKAST (SINGULAIR) 10 MG TABLET    TAKE 1 TABLET EVERY EVENING    POTASSIUM CHLORIDE SA (K-DUR,KLOR-CON) 20 MEQ TABLET    TAKE 2 TABLETS TWICE A DAY    ROSUVASTATIN (CRESTOR) 5 MG TABLET    TAKE 1 TABLET EVERY DAY    TADALAFIL (CIALIS) 20 MG TAB    Take 1 tablet (20 mg total) by mouth daily as needed for Erectile Dysfunction.    TRAZODONE (DESYREL) 100 MG TABLET    Take 1 tablet (100 mg total) by mouth every evening. At bedtime   Discontinued Medications    GABAPENTIN (NEURONTIN) 300 MG CAPSULE    Take 1 capsule (300 mg total) by mouth once daily.         Subjective:    Patient ID: Mina Zelaya is a 59 y.o. male.  Chief Complaint: Cough (Started 1 week ago) and Sinus Problem    Cough    Sinus Problem  Associated symptoms include coughing.     History of Present Illness    CHIEF COMPLAINT:  Patient presents today with right-sided headache and nasal symptoms.    HISTORY OF PRESENT ILLNESS:  He reports right-sided headache and eye pain with associated eye redness that began one week ago. He experiences nasal burning sensation with minimal discharge and intermittent cough throughout the day. He denies fever.      ROS:  Constitutional: -fevers  Head: +head pain, +headaches  Eyes: +eye  "redness  ENT: +sinus pressure, +nasal irritation  Respiratory: +cough       Review of Systems   Respiratory:  Positive for cough.        Objective:      Vitals:    05/26/25 1109   BP: 126/60   BP Location: Left arm   Patient Position: Sitting   Pulse: 77   Resp: 18   Temp: 98.4 °F (36.9 °C)   TempSrc: Oral   SpO2: 95%   Weight: 115.8 kg (255 lb 4.7 oz)   Height: 5' 10" (1.778 m)     BP Readings from Last 5 Encounters:   05/26/25 126/60   04/29/25 130/60   02/26/25 (!) 120/59   01/14/25 122/64   12/17/24 (!) 116/56     Wt Readings from Last 5 Encounters:   05/26/25 115.8 kg (255 lb 4.7 oz)   05/01/25 118.2 kg (260 lb 9.3 oz)   04/29/25 118.2 kg (260 lb 7.6 oz)   02/26/25 112.8 kg (248 lb 10.9 oz)   01/14/25 113 kg (249 lb 1.9 oz)     Physical Exam  HENT:      Nose:      Right Sinus: Maxillary sinus tenderness present.       Physical Exam    General: Well-developed. Well-nourished. No acute distress.  Eyes: EOMI. Sclerae anicteric.  HENT: Normocephalic. Atraumatic. Nares patent. Moist oral mucosa.  Ears: Fluid behind the left ear.  Cardiovascular: Regular rate. Regular rhythm. No murmurs. No rubs. No gallops. Normal S1, S2.  Respiratory: Normal respiratory effort. Clear to auscultation bilaterally. No rales. No rhonchi. No wheezing.  Musculoskeletal: No  obvious deformity.  Extremities: No lower extremity edema.  Neurological: Alert & oriented x3. No slurred speech. Normal gait.  Psychiatric: Normal mood. Normal affect. Good insight. Good judgment.  Skin: Warm. Dry. No rash.         Lab Results   Component Value Date    WBC 6.17 04/29/2025    HGB 13.1 (L) 04/29/2025    HCT 43.0 04/29/2025     04/29/2025    CHOL 179 04/29/2025    TRIG 41 04/29/2025    HDL 66 04/29/2025    ALT 21 04/29/2025    AST 28 04/29/2025     04/29/2025    K 4.1 04/29/2025     04/29/2025    CREATININE 0.8 04/29/2025    BUN 13 04/29/2025    CO2 28 04/29/2025    TSH 1.452 11/28/2022    PSA 0.90 04/29/2025    INR 1.0 07/14/2020    " HGBA1C 6.1 (H) 04/29/2025      This note was generated with the assistance of ambient listening technology. Verbal consent was obtained by the patient and accompanying visitor(s) for the recording of patient appointment to facilitate this note. I attest to having reviewed and edited the generated note for accuracy, though some syntax or spelling errors may persist. Please contact the author of this note for any clarification.

## 2025-05-28 ENCOUNTER — OFFICE VISIT (OUTPATIENT)
Dept: ORTHOPEDICS | Facility: CLINIC | Age: 60
End: 2025-05-28
Payer: MEDICARE

## 2025-05-28 VITALS
DIASTOLIC BLOOD PRESSURE: 66 MMHG | HEART RATE: 63 BPM | SYSTOLIC BLOOD PRESSURE: 128 MMHG | WEIGHT: 253.5 LBS | BODY MASS INDEX: 36.38 KG/M2

## 2025-05-28 DIAGNOSIS — Z96.641 S/P HIP REPLACEMENT, RIGHT: Primary | ICD-10-CM

## 2025-05-28 PROCEDURE — 3044F HG A1C LEVEL LT 7.0%: CPT | Mod: CPTII,HCNC,S$GLB, | Performed by: ORTHOPAEDIC SURGERY

## 2025-05-28 PROCEDURE — 99999 PR PBB SHADOW E&M-EST. PATIENT-LVL III: CPT | Mod: PBBFAC,HCNC,, | Performed by: ORTHOPAEDIC SURGERY

## 2025-05-28 PROCEDURE — 3008F BODY MASS INDEX DOCD: CPT | Mod: CPTII,HCNC,S$GLB, | Performed by: ORTHOPAEDIC SURGERY

## 2025-05-28 PROCEDURE — 99213 OFFICE O/P EST LOW 20 MIN: CPT | Mod: HCNC,S$GLB,, | Performed by: ORTHOPAEDIC SURGERY

## 2025-05-28 PROCEDURE — 3078F DIAST BP <80 MM HG: CPT | Mod: CPTII,HCNC,S$GLB, | Performed by: ORTHOPAEDIC SURGERY

## 2025-05-28 PROCEDURE — 1159F MED LIST DOCD IN RCRD: CPT | Mod: CPTII,HCNC,S$GLB, | Performed by: ORTHOPAEDIC SURGERY

## 2025-05-28 PROCEDURE — 3074F SYST BP LT 130 MM HG: CPT | Mod: CPTII,HCNC,S$GLB, | Performed by: ORTHOPAEDIC SURGERY

## 2025-05-28 NOTE — PROGRESS NOTES
Progress note    HPI    Mina Zelaya is here six-month s/p the following procedure:     DOS: 12/2/2024  Right total hip replacement    Overall doing well. Pain controlled on current regimen.  Has completed therapy.  Back at the gym.  Able to sleep better at night.  Reports some baseline discomfort in the right hip but pain better than preoperatively.  Still having some stiffness that is getting better as well.  At some point wants to discuss left hip replacement.  Shooting for fall surgical date.    Past Medical History:   Diagnosis Date    Arthritis     Asthma     Chronic bronchitis     Diaphragm paralysis     Emphysema of lung     Heavy alcohol consumption 10/01/2018    Type 2 diabetes mellitus with hyperlipidemia 01/25/2018     Past Surgical History:   Procedure Laterality Date    HIP ARTHROPLASTY Right 12/2/2024    Procedure: ARTHROPLASTY, HIP;  Surgeon: Joon Quigley MD;  Location: [facility];  Service: Orthopedics;  Laterality: Right;  right INA, [facility], patient has diaphramatic paralysis    HIP REPLACEMENT ARTHROPLASTY Right 12/02/2024    REPAIR, TENDON, BICEPS, DISTAL Right     UMBILICAL HERNIA REPAIR       Current Medications[1]  Review of patient's allergies indicates:   Allergen Reactions    Atorvastatin      myalgia    Sulfa (sulfonamide antibiotics) Rash     Social History     Social History Narrative    Not on file     Family History   Problem Relation Name Age of Onset    Stroke Mother      No Known Problems Father      Pneumonia Sister      Stroke Brother      No Known Problems Maternal Aunt      Stroke Paternal Aunt      Cancer Paternal Uncle      Cancer Maternal Grandmother      Heart failure Maternal Grandfather      Stroke Paternal Grandmother      No Known Problems Paternal Grandfather      Stroke Brother      No Known Problems Brother      No Known Problems Brother           Physical Exam:     Patient is alert and oriented no acute distress.   Assistive Device:  None    Right hip:  incision(s) are well healed.  There is no evidence of dehiscence.  There is no induration erythema or signs of infection.  No significant soft tissue swelling.  Compartments are soft and compressible.  Warm well-perfused extremity. Flexion 120°.  Negative Stinchfield.  Pain with internal rotation past 15°.  External rotation 45°.  Mild Trochanteric tenderness.    Imaging:     I have personally reviewed the following imaging and these are an interpretation of my findings:     X-Ray: I have reviewed all pertinent results/findings and my personal findings are:  s/p R INA in good alignment without evidence of hardware failure or complication.  Left hip DJD    Assessment    Mina Zelaya is six-month Post-op right INA    Plan:    Overall doing well status post right INA.  Continue to progress activities as tolerated.  No restrictions.  Follow up in 3-4 months to discuss left INA               [1]   Current Outpatient Medications:     albuterol (PROVENTIL/VENTOLIN HFA) 90 mcg/actuation inhaler, 2 puffs every 4 hours as needed for cough, wheeze, or shortness of breath, Disp: 54 g, Rfl: 3    albuterol-ipratropium (DUO-NEB) 2.5 mg-0.5 mg/3 mL nebulizer solution, Take 3 mLs by nebulization every 6 (six) hours as needed for Wheezing or Shortness of Breath. Rescue, Disp: 120 each, Rfl: 5    allopurinoL (ZYLOPRIM) 100 MG tablet, TAKE 1 TABLET EVERY DAY, Disp: 90 tablet, Rfl: 3    allopurinoL (ZYLOPRIM) 300 MG tablet, TAKE 1 TABLET EVERY DAY, Disp: 90 tablet, Rfl: 3    amoxicillin-clavulanate 875-125mg (AUGMENTIN) 875-125 mg per tablet, Take 1 tablet by mouth 2 (two) times daily. for 7 days, Disp: 14 tablet, Rfl: 0    ascorbic acid, vitamin C, (VITAMIN C) 1000 MG tablet, Take 1,000 mg by mouth once daily., Disp: , Rfl:     aspirin (ECOTRIN) 81 MG EC tablet, Take 1 tablet (81 mg total) by mouth 2 (two) times a day., Disp: 60 tablet, Rfl: 0    bumetanide (BUMEX) 2 MG tablet, Take 1 tablet (2 mg total) by mouth once daily., Disp:  90 tablet, Rfl: 3    calcium carbonate (OS-VÍCTOR) 600 mg (1,500 mg) Tab, Take 600 mg by mouth once daily., Disp: , Rfl:     cyanocobalamin (VITAMIN B-12) 100 MCG tablet, Take 100 mcg by mouth once daily., Disp: , Rfl:     fish oil-omega-3 fatty acids 300-1,000 mg capsule, Take 1,200 mg by mouth once daily., Disp: , Rfl:     HYDROcodone-acetaminophen (NORCO)  mg per tablet, Take 1 tablet by mouth every 8 (eight) hours as needed for Pain., Disp: 90 tablet, Rfl: 0    ibuprofen (ADVIL,MOTRIN) 600 MG tablet, Take 1 tablet (600 mg total) by mouth 3 (three) times daily. (Patient taking differently: Take 800 mg by mouth 3 (three) times daily.), Disp: 60 tablet, Rfl: 1    Lactobacillus acidophilus 10 billion cell Cap, Take 1 capsule by mouth., Disp: , Rfl:     loratadine (CLARITIN) 10 mg tablet, Take 10 mg by mouth once daily., Disp: , Rfl:     metFORMIN (GLUCOPHAGE-XR) 500 MG ER 24hr tablet, TAKE 1 TABLET EVERY MORNING WITH BREAKFAST, Disp: 90 tablet, Rfl: 3    montelukast (SINGULAIR) 10 mg tablet, TAKE 1 TABLET EVERY EVENING, Disp: 90 tablet, Rfl: 2    potassium chloride SA (K-DUR,KLOR-CON) 20 MEQ tablet, TAKE 2 TABLETS TWICE A DAY, Disp: 360 tablet, Rfl: 0    rosuvastatin (CRESTOR) 5 MG tablet, TAKE 1 TABLET EVERY DAY, Disp: 90 tablet, Rfl: 3    tadalafiL (CIALIS) 20 MG Tab, Take 1 tablet (20 mg total) by mouth daily as needed for Erectile Dysfunction., Disp: 20 tablet, Rfl: 11    traZODone (DESYREL) 100 MG tablet, Take 1 tablet (100 mg total) by mouth every evening. At bedtime, Disp: 90 tablet, Rfl: 3

## 2025-06-13 ENCOUNTER — PATIENT OUTREACH (OUTPATIENT)
Facility: OTHER | Age: 60
End: 2025-06-13
Payer: MEDICARE

## 2025-06-13 NOTE — PROGRESS NOTES
Kandis Mota  ED Navigator  Emergency Department    Project: [facility] ED Navigator  Role: Community Health Worker    Date: 2025  Patient Name: Mina Zelaya  MRN: [id number]  PCP: Otto Bruce MD    Assessment:     Mina Zelaya is a 59 y.o. male who has presented to ED for abscess. Patient has visited the ED 1 times in the past 3 months. Patient did not contact PCP.     ED Navigator Initial Assessment    ED Navigator Enrollment Documentation  Consent to Services  Does patient consent to completing the assessment?: Yes  Contact  Method of Initial Contact: Phone  Transportation  Insurance Coverage  Do you have coverage/adequate coverage?: Yes  Specialist Appointment  Did the patient come to the ED to see a specialist?: No  Does the patient have a pending specialist referral?: No  Does the patient have a specialist appointment made?: No  PCP Follow Up Appointment  Medications  Is patient able to afford medication?: Yes  Psychological  Food  Communication/Education  Other Financial Concerns  Other Social Barriers/Concerns  Primary Barrier         Social History     Socioeconomic History    Marital status:    Tobacco Use    Smoking status: Former     Current packs/day: 0.00     Average packs/day: 1 pack/day for 15.0 years (15.0 ttl pk-yrs)     Types: Cigarettes     Start date: 1988     Quit date: 2003     Years since quittin.0    Smokeless tobacco: Never   Substance and Sexual Activity    Alcohol use: Not Currently     Alcohol/week: 25.0 standard drinks of alcohol     Types: 25 Cans of beer per week    Drug use: No    Sexual activity: Not Currently     Partners: Female       Plan:   Patient was contacted for post ED discharge navigation. They have an appointment scheduled with Dr. Ruben Leiva on 25 at 8:40. ED navigator will remind patient of appointment.

## 2025-06-20 DIAGNOSIS — E87.6 HYPOKALEMIA: ICD-10-CM

## 2025-06-20 DIAGNOSIS — M51.369 DDD (DEGENERATIVE DISC DISEASE), LUMBAR: ICD-10-CM

## 2025-06-20 NOTE — TELEPHONE ENCOUNTER
No care due was identified.  [facility] Embedded Care Due Messages. Reference number: [id number].   6/20/2025 5:35:06 PM CDT

## 2025-06-23 ENCOUNTER — PATIENT OUTREACH (OUTPATIENT)
Facility: OTHER | Age: 60
End: 2025-06-23
Payer: MEDICARE

## 2025-06-23 RX ORDER — IBUPROFEN 800 MG/1
800 TABLET, FILM COATED ORAL 3 TIMES DAILY
Qty: 30 TABLET | Refills: 0 | Status: SHIPPED | OUTPATIENT
Start: 2025-06-23

## 2025-06-23 RX ORDER — BUMETANIDE 2 MG/1
2 TABLET ORAL DAILY
Qty: 90 TABLET | Refills: 3 | Status: SHIPPED | OUTPATIENT
Start: 2025-06-23

## 2025-06-23 RX ORDER — POTASSIUM CHLORIDE 20 MEQ/1
40 TABLET, EXTENDED RELEASE ORAL 2 TIMES DAILY
Qty: 360 TABLET | Refills: 3 | Status: SHIPPED | OUTPATIENT
Start: 2025-06-23

## 2025-06-23 RX ORDER — ROSUVASTATIN CALCIUM 5 MG/1
5 TABLET, COATED ORAL
Qty: 90 TABLET | Refills: 3 | Status: SHIPPED | OUTPATIENT
Start: 2025-06-23

## 2025-06-23 RX ORDER — TRAZODONE HYDROCHLORIDE 100 MG/1
100 TABLET ORAL NIGHTLY
Qty: 90 TABLET | Refills: 3 | Status: SHIPPED | OUTPATIENT
Start: 2025-06-23

## 2025-06-23 NOTE — TELEPHONE ENCOUNTER
Refill Routing Note   Medication(s) are not appropriate for processing by [facility] for the following reason(s):        Required vitals abnormal  ED/Hospital Visit since last OV with provider    [facility] action(s):  Approve  Defer        Medication Therapy Plan: ED visit for abcess. No FOVS. Pend 90 with 3 on trazodone, potassium. Approve 90 wth 3 rosuvastatin. Defer Bumetanide -abn BP. Allergy last edited on: 6/27/18 at 0855 by Otto Bruce MD    Alert overridden per protocol: Yes   Pharmacist review requested: Yes   Extended chart review required: Yes     Appointments  past 12m or future 3m with PCP    Date Provider   Last Visit   5/26/2025 Otto Bruce MD   Next Visit   Visit date not found Otto Bruce MD   ED visits in past 90 days: 1        Note composed:3:55 PM 06/23/2025

## 2025-06-23 NOTE — TELEPHONE ENCOUNTER
Refill Routing Note   Medication(s) are not appropriate for processing by [facility] for the following reason(s):        Required vitals abnormal  ED/Hospital Visit since last OV with provider  Drug-disease interaction: traZODone and Hypokalemia: potassium chloride SA and Ulcerative colitis    [facility] action(s):  Approve  Defer        Medication Therapy Plan: ED visit for abcess. No FOVS. Pend 90 with 3 on trazodone, potassium. Approve 90 wth 3 rosuvastatin. Defer Bumetanide -abn BP. Allergy last edited on: 6/27/18 at 0855 by Otto Bruce MD    Alert overridden per protocol: Yes   Pharmacist review requested: Yes     Appointments  past 12m or future 3m with PCP    Date Provider   Last Visit   5/26/2025 Otto Bruce MD   Next Visit   Visit date not found Otto Bruce MD   ED visits in past 90 days: 1        Note composed:2:51 PM 06/23/2025

## 2025-06-23 NOTE — TELEPHONE ENCOUNTER
Refill Routing Note   Medication(s) are not appropriate for processing by [facility] for the following reason(s):        Non-participating provider  Responsible provider unclear    [facility] action(s):  Route             Appointments  past 12m or future 3m with PCP    Date Provider   Last Visit   11/7/2018 Nickie Erwin NP   Next Visit   Visit date not found Nickie Erwin NP   ED visits in past 90 days: 1        Note composed:7:55 AM 06/23/2025

## 2025-06-24 ENCOUNTER — OFFICE VISIT (OUTPATIENT)
Dept: PULMONOLOGY | Facility: CLINIC | Age: 60
End: 2025-06-24
Payer: MEDICARE

## 2025-06-24 VITALS
BODY MASS INDEX: 36.02 KG/M2 | HEIGHT: 70 IN | HEART RATE: 63 BPM | DIASTOLIC BLOOD PRESSURE: 64 MMHG | SYSTOLIC BLOOD PRESSURE: 120 MMHG | OXYGEN SATURATION: 95 % | WEIGHT: 251.63 LBS

## 2025-06-24 DIAGNOSIS — R73.03 BORDERLINE DIABETES: ICD-10-CM

## 2025-06-24 DIAGNOSIS — G47.33 OSA (OBSTRUCTIVE SLEEP APNEA): ICD-10-CM

## 2025-06-24 DIAGNOSIS — J98.6 DIAPHRAGM PARALYSIS: ICD-10-CM

## 2025-06-24 DIAGNOSIS — J44.9 COPD MIXED TYPE: Primary | ICD-10-CM

## 2025-06-24 DIAGNOSIS — J44.9 CHRONIC OBSTRUCTIVE PULMONARY DISEASE, UNSPECIFIED COPD TYPE: ICD-10-CM

## 2025-06-24 PROCEDURE — 99999 PR PBB SHADOW E&M-EST. PATIENT-LVL IV: CPT | Mod: PBBFAC,HCNC,, | Performed by: INTERNAL MEDICINE

## 2025-06-24 PROCEDURE — 3008F BODY MASS INDEX DOCD: CPT | Mod: CPTII,HCNC,S$GLB, | Performed by: INTERNAL MEDICINE

## 2025-06-24 PROCEDURE — 3044F HG A1C LEVEL LT 7.0%: CPT | Mod: CPTII,HCNC,S$GLB, | Performed by: INTERNAL MEDICINE

## 2025-06-24 PROCEDURE — 3078F DIAST BP <80 MM HG: CPT | Mod: CPTII,HCNC,S$GLB, | Performed by: INTERNAL MEDICINE

## 2025-06-24 PROCEDURE — 3074F SYST BP LT 130 MM HG: CPT | Mod: CPTII,HCNC,S$GLB, | Performed by: INTERNAL MEDICINE

## 2025-06-24 PROCEDURE — 1159F MED LIST DOCD IN RCRD: CPT | Mod: CPTII,HCNC,S$GLB, | Performed by: INTERNAL MEDICINE

## 2025-06-24 PROCEDURE — 99213 OFFICE O/P EST LOW 20 MIN: CPT | Mod: HCNC,S$GLB,, | Performed by: INTERNAL MEDICINE

## 2025-06-24 RX ORDER — SEMAGLUTIDE 0.68 MG/ML
0.5 INJECTION, SOLUTION SUBCUTANEOUS
Qty: 4 EACH | Refills: 2 | Status: SHIPPED | OUTPATIENT
Start: 2025-06-24

## 2025-06-24 RX ORDER — ALBUTEROL SULFATE 90 UG/1
INHALANT RESPIRATORY (INHALATION)
Qty: 54 G | Refills: 3 | Status: SHIPPED | OUTPATIENT
Start: 2025-06-24

## 2025-06-24 RX ORDER — VEDOLIZUMAB 300 MG/5ML
300 INJECTION, POWDER, LYOPHILIZED, FOR SOLUTION INTRAVENOUS
COMMUNITY

## 2025-06-24 RX ORDER — BUDESONIDE, GLYCOPYRROLATE, AND FORMOTEROL FUMARATE 160; 9; 4.8 UG/1; UG/1; UG/1
2 AEROSOL, METERED RESPIRATORY (INHALATION) 2 TIMES DAILY
Qty: 10.7 G | Refills: 11 | Status: SHIPPED | OUTPATIENT
Start: 2025-06-24

## 2025-06-24 NOTE — PROGRESS NOTES
Notes:      6/25/2025    Mina Zelaya  Office Note    Chief Complaint   Patient presents with    Follow-up    Sleep Apnea       HPI:    6/24/2025 pt had right hip repair December with no clear breathing issues , had difficulty getting new bipap.  Sleeping well. Needs left hip repair.    Sleeps 8-10 hrs nightly with bipap.    Got machine from [employer].       December 23, 2024-needs bipap or home vent,  believes order needs to go to [facility].  [facility] stiolto and too[facility]$.  [facility]  Did well with hip surg.. cannot drive few weeks    Newly  to nurse..  Addendum concerning need for niv/bipap:   The patient has pulmonary functions available on epic from the [facility] done July 16, 2015 showing forced vital capacity of 40% and an FEV1 of 39% at 1 point 4 L. the FEV1 to FVC ratio was 80%.  There was no measurable airflow obstruction.  There was no measurable COPD.     The patient has had supine and erect vital capacity measurements in the past that are not available.  These studies did show evidence for diaphragm paralysis.  The patient has also been evaluated by other techniques to demonstrate diaphragm paralysis.     The patient has an established diagnosis of bilateral diaphragm paralysis.  He has no COPD.     The patient needs a noninvasive ventilator/BiPAP device.     I would request being informed as any specific test that needs to be ordered to arrange this device.     Patient Instructions   Will place orders for bipap and non invasive vent--either should work    You may wish to purchase bipap machine if expense dictates.      Use duoneb treatment in place stilto..       8/8/2024--infusion rx colitis  Saw Dr Lew-- felt to have aseptic necrosis but orhro retired and no clear mri dx.. hip x rays results viewed from 8/2023     Some wt loss    Breatghing same --uses niv, bipap not arranged..    Patient Instructions   Non invasive ventilator  working well -- need to continue.....    Will  "ask staff to get setting on non invasive ventilator.   Will place bipap order to mimic non invasive ventilator.      You use reclining bed and need to sleep left side down as right hip is severe with right side down...      You cannot tolerate supine for mri,  may consider just total hip replacement.  Consider orthopedic referral..      Will order xray hips    Would order radionuclide bone scan if hip xrays not diagnostic.    If clear diagnosis seen on xray -- orthopedic follow up reasonable from pulmonary perspective..  othopedic follow up reasonable anyhow..    5/31/2023 didn't get dental wk as cannot lay back had partial root canal-- no infection issues. Uses niv for sleep, cannot lay flat, starr, very limited with starr-- barely able to breath. Uses inhalers.....  H/o bilat diaphragm paralysis....  On rx for pre diabetes    Niv copay 200/month -- pt would prefer less copay.  Willing to try bipap    Patient Instructions   [facility] attending statement done today  Cxr and ct chest 4/2016 viewed-- lungs good but high diaphragms..  Exam shows evidence for ongoing diaphragm paralysis    Weight loss should help breathing dramatically-- consider :::from uptodate----Tirzepatide is a novel GLP-1 and GIP receptor agonist administered by once-weekly subcutaneous injection [38]. It is effective in the treatment of obesity in patients with and without diabetes mellitus [9] (see "Glucagon-like peptide 1-based therapies for the treatment of type 2 diabetes mellitus", section on 'Weight loss'). As examples:  ?In an open-label trial including over 1800 patients with diabetes, once-weekly tirzepatide (in varying doses) was compared with semaglutide 1 mg [39]. At 40 weeks, reduction in body weight with all doses of tirzepatide was greater compared with semaglutide (5, 10, and 15 mg of tirzepatide; -7.6, -9.3, and -11.2 kg, respectively: 1 mg of semaglutide; -5.7 kg). Of note, no participants received semaglutide of 2.4 mg once weekly, " which is the recommended dose for treatment of obesity. (See 'Dosing and contraindications' above.)  ?In a double-blind placebo-controlled randomized trial including over 2500 adults with obesity (but without diabetes), tirzepatide once weekly was compared with placebo [9]. At 72 weeks, reduction in body weight at all tirzepatide doses (5, 10, and 15 mg) was greater compared with placebo (-16.1, -22.2, and -23.6 kg, respectively, versus -2.4 kg).        Would use bipap to cut cost as able.  Niv would be preferred.  10/3/2022 had chr drip and cough, uses niv -- couldn't sleep without niv as would stop breathing.  Having severe hip problems. Limited by hips somewhat but also hip burning pain.  No prednisone nor abx, uses stiolto.  Grandson entering [employer].    Patient Instructions   You are using and benefiting from non invasive ventilator.      Would recommend considering Ozempic for diabetes as my facilitate weight loss.  Weight loss will help respiratory failure and hip problems.    Hip therapy may help mobility.     Would recommend six min walk and portable oxygen concentrator.     Letter done for dental work    We briefly discuss weight loss surgery.      3/25/2021 - uses niv machine all night and freq day -- pt had insurance change and needs paper work completed.  Pt cannot lay flat.  Got Moderna vaccine.      Patient Instructions   You are using and having great benefit from non invasive ventilator.  You had had numerous hospital stays due respiratory failure prior to using non invasive ventilator.  You have bilateral diaphragm paralysis problems prior to 2008 by your symptoms.  You need to continue this treatment.  1/22/2020- breathing so bad cannot do activity.  No channge, needs rescue meds to help but not relieve sob, uses niv nightly - use roughly 8-10 hrs daily.  Uses stiolto with good results, no abx used.      Patient Instructions   No great changes, need prompt evaluation for lung problems.    May  need antibiotic.    You need stiolto    May 22, 2019- breathing difficult today, SOB worse with exertion, states recue inhaler does not help much. Worsened in past 24 hours, affected with weather change. Currently using NIV nightly. Nosebleeds resolved. NO prednisone use in past year. Cold in Nov 2018, tx antibiotics. On supplemental oxygen wearing 2-4 hours daily and all night. Currently using stiolto daily.   Discussed with patient above for education the following:    Continuation of current therapy  Continue NIV therapy for diaphragm paralysis induced respiratory failure.  Continue Stiolto daily    Cause of paralysis not clear, not likely reversable.    Plication of diaphragm is invasive surgical, may not help much, useful for one side paralysis.  Could have eval at main campus?  Follow here later?    Discussed weight loss services at [facility], if interested contact clinic for referral  Abdominal weight does effect ability to breath when bending over.    Will try new medication for 2 weeks, Trelegy 1 puff daily, if not benefit return to Stiolto daily, if you feel a benefit contact clinic and will order  April 16, 2018- lives alone, sees grandson daily post school, having freq nose bleed, uses full face mask for niv.  Frustrated, marked starr.    jan 11, 2017- inactivity ppt weight gain, mood bad at times, daughter and grandson moved out and  Pt feeling down a bit.     Uses o2 and non invasive ventilator -- extreme air hunger with activity and supine. aspriates 2/day to 2 /week.  Uses bronchial rx.      Uses niv and o2 every night for sleep and for naps 1-2 daily.  Feels like worsening.    Sept 9, 2016HPI: worse off advair and spiriva, irratents worsen, niv used 12/24 daily, cannot bend over and uc doing poorly due to abd pain.        The chief compliant  problem varies with instablilty at time    PFSH:  Past Medical History:   Diagnosis Date    Arthritis     Asthma     Chronic bronchitis     Diaphragm paralysis      Emphysema of lung     Heavy alcohol consumption 10/01/2018    Type 2 diabetes mellitus with hyperlipidemia 2018         Past Surgical History:   Procedure Laterality Date    HIP ARTHROPLASTY Right 2024    Procedure: ARTHROPLASTY, HIP;  Surgeon: Joon Quigley MD;  Location: [facility];  Service: Orthopedics;  Laterality: Right;  right INA, [place], patient has diaphramatic paralysis    HIP REPLACEMENT ARTHROPLASTY Right 2024    REPAIR, TENDON, BICEPS, DISTAL Right     UMBILICAL HERNIA REPAIR       Social History     Tobacco Use    Smoking status: Former     Current packs/day: 0.00     Average packs/day: 1 pack/day for 15.0 years (15.0 ttl pk-yrs)     Types: Cigarettes     Start date: 1988     Quit date: 2003     Years since quittin.0    Smokeless tobacco: Never   Substance Use Topics    Alcohol use: Not Currently     Alcohol/week: 25.0 standard drinks of alcohol     Types: 25 Cans of beer per week    Drug use: No     Family History   Problem Relation Name Age of Onset    Stroke Mother      No Known Problems Father      Pneumonia Sister      Stroke Brother      No Known Problems Maternal Aunt      Stroke Paternal Aunt      Cancer Paternal Uncle      Cancer Maternal Grandmother      Heart failure Maternal Grandfather      Stroke Paternal Grandmother      No Known Problems Paternal Grandfather      Stroke Brother      No Known Problems Brother      No Known Problems Brother       Review of patient's allergies indicates:   Allergen Reactions    Sulfa (sulfonamide antibiotics) Rash       Performance Status:The patient's activity level is housebound activities.      Review of Systems:  a review of eleven systems covering constitutional, Eye, HEENT, Psych, Respiratory, Cardiac, GI, , Musculoskeletal, Endocrine, Dermatologic was negative except for pertinent findings as listed ABOVE and below: all good except r elbow arthritis and neck pain SOB with exertion.    Exam:Comprehensive  "exam done.   /64 (BP Location: Left arm, Patient Position: Sitting)   Pulse 63   Ht 5' 10" (1.778 m)   Wt 114.1 kg (251 lb 10.5 oz)   SpO2 95% Comment: on room air at rest  BMI 36.11 kg/m²   Exam included Vitals as listed, and patient's appearance and affect and alertness and mood, oral exam for yeast and hygiene and pharynx lesions and Mallapatti (M) score, neck with inspection for jvd and masses and thyroid abnormalities and lymph nodes (supraclavicular and infraclavicular nodes and axillary also examined and noted if abn), chest exam included symmetry and effort and fremitus and percussion and auscultation, cardiac exam included rhythm and gallops and murmur and rubs and jvd and edema, abdominal exam for mass and hepatosplenomegaly and tenderness and hernias and bowel sounds, Musculoskeletal exam with muscle tone and posture and mobility/gait and  strength, and skin for rashes and cyanosis and pallor and turgor, extremity for clubbing.  Findings were normal except for pertinent findings listed below: affect flat  M3 and no diaphragm motion bilaterally-- percussion and palpation no abd movement, good bs but shallow.    Radiographs reviewed: was not done   XR CHEST PA AND LATERAL 11/07/2018   Redemonstration of mild bilateral basal lung stranding a chronic finding apparently.  No new pulmonary changes.    The cardiac silhouette is normal in size. The hilar and mediastinal contours are unremarkable.      Labs reviewed  Results for WILFREDHEAVEN SANCHEZ NANCY (MRN [id number]) as of 5/22/2019 09:29   Ref. Range 2/11/2019 07:14   Sodium Latest Ref Range: 136 - 145 mmol/L 139   Potassium Latest Ref Range: 3.5 - 5.1 mmol/L 3.6   Chloride Latest Ref Range: 101 - 111 mmol/L 99 (L)   CO2 Latest Ref Range: 23 - 29 mmol/L 27   Anion Gap Latest Ref Range: 8 - 16 mmol/L 13   BUN, Bld Latest Ref Range: 6 - 20 mg/dL 17   Creatinine Latest Ref Range: 0.5 - 1.4 mg/dL 0.8   eGFR if non African American Latest Ref Range: >60 " mL/min/1.73 m^2 >60.0   eGFR if African American Latest Ref Range: >60 mL/min/1.73 m^2 >60.0   Glucose Latest Ref Range: 74 - 118 mg/dL 123 (H)   Calcium Latest Ref Range: 8.6 - 10.0 mg/dL 8.8       PFT was not done    Plan:  Clinical impression is resonably certain and repeated evaluation prn +/- follow up will be needed as below.    Mina was seen today for follow-up and shortness of breath.    Diagnoses and all orders for this visit:    Diaphragm paralysis   - Continue NIV therapy  COPD mixed type  -     montelukast (SINGULAIR) 10 mg tablet; Take 1 tablet (10 mg total) by mouth every evening.  -     albuterol-ipratropium (DUO-NEB) 2.5 mg-0.5 mg/3 mL nebulizer solution; Take 3 mLs by nebulization every 6 (six) hours as needed for Wheezing or Shortness of Breath. Rescue  -     VENTOLIN HFA 90 mcg/actuation inhaler; Inhale 1 puff into the lungs 4 (four) times daily as needed.    Moderate persistent asthma, uncomplicated  -     montelukast (SINGULAIR) 10 mg tablet; Take 1 tablet (10 mg total) by mouth every evening.  -     albuterol-ipratropium (DUO-NEB) 2.5 mg-0.5 mg/3 mL nebulizer solution; Take 3 mLs by nebulization every 6 (six) hours as needed for Wheezing or Shortness of Breath. Rescue  -     VENTOLIN HFA 90 mcg/actuation inhaler; Inhale 1 puff into the lungs 4 (four) times daily as needed.    Chronic respiratory failure with hypoxia   - continue NIV therapy  Chronic sinus complaints  -     montelukast (SINGULAIR) 10 mg tablet; Take 1 tablet (10 mg total) by mouth every evening.    Chronic obstructive pulmonary disease, unspecified COPD type  -     albuterol-ipratropium (DUO-NEB) 2.5 mg-0.5 mg/3 mL nebulizer solution; Take 3 mLs by nebulization every 6 (six) hours as needed for Wheezing or Shortness of Breath. Rescue    Class 2 obesity with alveolar hypoventilation, serious comorbidity, and body mass index (BMI) of 37.0 to 37.9 in adult   - discussed [facility] weight loss services      Follow up in about 1 year  (around 6/24/2026), or if symptoms worsen or fail to improve.    Discussed with patient above for education the following:         Mina was seen today for follow-up and sleep apnea.    Diagnoses and all orders for this visit:    COPD mixed type  -     budesonide-glycopyr-formoterol (BREZTRI AEROSPHERE) 160-9-4.8 mcg/actuation HFAA; Inhale 2 puffs into the lungs 2 (two) times a day.    Chronic obstructive pulmonary disease, unspecified COPD type  -     budesonide-glycopyr-formoterol (BREZTRI AEROSPHERE) 160-9-4.8 mcg/actuation HFAA; Inhale 2 puffs into the lungs 2 (two) times a day.  -     semaglutide (OZEMPIC) 0.25 mg or 0.5 mg (2 mg/3 mL) pen injector; Inject 0.5 mg into the skin every 7 days.    Diaphragm paralysis  -     budesonide-glycopyr-formoterol (BREZTRI AEROSPHERE) 160-9-4.8 mcg/actuation HFAA; Inhale 2 puffs into the lungs 2 (two) times a day.    MARGARITO (obstructive sleep apnea)  -     albuterol (PROVENTIL/VENTOLIN HFA) 90 mcg/actuation inhaler; 2 puffs every 4 hours as needed for cough, wheeze, or shortness of breath    Borderline diabetes  -     semaglutide (OZEMPIC) 0.25 mg or 0.5 mg (2 mg/3 mL) pen injector; Inject 0.5 mg into the skin every 7 days.                  Follow up in about 1 year (around 6/24/2026), or if symptoms worsen or fail to improve.    Discussed with patient above for education the following:      Patient Instructions   Will ask staff to assure no charge for today's visit,  you had paid for prior virtual visit but visit didn't occur.        Your medical condition is such you can have hip surgery with no concerns except you will need assistance if sedated and/or laying.   You are cleared.    Breztri [place]    You are using niv with benefit == 8-10 hrs nightly    Medically would recommend ozempic for breathing--- would  start 2.5 mg weekly -- may increase dose every 4 wks if tolerated --stop if not tolerated.....      Average use bipap over 09 hrs --  compliance report viewed and good        Eval 26 min

## 2025-06-24 NOTE — PATIENT INSTRUCTIONS
Will ask staff to assure no charge for today's visit,  you had paid for prior virtual visit but visit didn't occur.        Your medical condition is such you can have hip surgery with no concerns except you will need assistance if sedated and/or laying.   You are cleared.    Ciara [place]..    You are using niv with benefit == 8-10 hrs nightly    Medically would recommend ozempic for breathing--- would  start 2.5 mg weekly -- may increase dose every 4 wks if tolerated --stop if not tolerated.....      Average use bipap over 09 hrs -- compliance report viewed and good

## 2025-06-25 ENCOUNTER — TELEPHONE (OUTPATIENT)
Dept: PULMONOLOGY | Facility: CLINIC | Age: 60
End: 2025-06-25
Payer: MEDICARE

## 2025-06-27 ENCOUNTER — TELEPHONE (OUTPATIENT)
Dept: PULMONOLOGY | Facility: CLINIC | Age: 60
End: 2025-06-27
Payer: MEDICARE

## 2025-06-27 NOTE — TELEPHONE ENCOUNTER
Copied from CRM #[id number]. Topic: General Inquiry - Patient Advice  >> Jun 27, 2025  1:36 PM Petra wrote:  Type:  Needs Medical Advice    Who Called: patient    Would the patient rather a call back or a response via [facility]? call  Best Call Back Number: [phone]   Additional Information: Patient states he is calling about some paperwork that needs to be filled out for him and income is needed. Please call

## 2025-06-27 NOTE — TELEPHONE ENCOUNTER
Pt stated that he and his wife make about $5,000/per month  advised would call him if anything else was needed

## 2025-09-04 RX ORDER — ALLOPURINOL 100 MG/1
100 TABLET ORAL
Qty: 90 TABLET | Refills: 3 | Status: SHIPPED | OUTPATIENT
Start: 2025-09-04

## 2025-09-04 RX ORDER — ALLOPURINOL 300 MG/1
300 TABLET ORAL
Qty: 90 TABLET | Refills: 3 | Status: SHIPPED | OUTPATIENT
Start: 2025-09-04